# Patient Record
Sex: MALE | Race: ASIAN | NOT HISPANIC OR LATINO
[De-identification: names, ages, dates, MRNs, and addresses within clinical notes are randomized per-mention and may not be internally consistent; named-entity substitution may affect disease eponyms.]

---

## 2019-08-01 ENCOUNTER — APPOINTMENT (OUTPATIENT)
Dept: THORACIC SURGERY | Facility: CLINIC | Age: 44
End: 2019-08-01
Payer: COMMERCIAL

## 2019-08-01 VITALS
RESPIRATION RATE: 17 BRPM | SYSTOLIC BLOOD PRESSURE: 161 MMHG | HEIGHT: 64.17 IN | TEMPERATURE: 98.3 F | HEART RATE: 84 BPM | WEIGHT: 189 LBS | DIASTOLIC BLOOD PRESSURE: 95 MMHG | BODY MASS INDEX: 32.27 KG/M2 | OXYGEN SATURATION: 98 %

## 2019-08-01 DIAGNOSIS — Z80.42 FAMILY HISTORY OF MALIGNANT NEOPLASM OF PROSTATE: ICD-10-CM

## 2019-08-01 DIAGNOSIS — Z87.891 PERSONAL HISTORY OF NICOTINE DEPENDENCE: ICD-10-CM

## 2019-08-01 DIAGNOSIS — Z78.9 OTHER SPECIFIED HEALTH STATUS: ICD-10-CM

## 2019-08-01 PROCEDURE — 99205 OFFICE O/P NEW HI 60 MIN: CPT

## 2019-08-02 ENCOUNTER — OUTPATIENT (OUTPATIENT)
Dept: OUTPATIENT SERVICES | Facility: HOSPITAL | Age: 44
LOS: 1 days | End: 2019-08-02

## 2019-08-02 VITALS
SYSTOLIC BLOOD PRESSURE: 134 MMHG | HEIGHT: 65 IN | WEIGHT: 188.05 LBS | TEMPERATURE: 97 F | RESPIRATION RATE: 16 BRPM | DIASTOLIC BLOOD PRESSURE: 90 MMHG | HEART RATE: 76 BPM

## 2019-08-02 DIAGNOSIS — Z98.890 OTHER SPECIFIED POSTPROCEDURAL STATES: Chronic | ICD-10-CM

## 2019-08-02 DIAGNOSIS — J18.1 LOBAR PNEUMONIA, UNSPECIFIED ORGANISM: ICD-10-CM

## 2019-08-02 DIAGNOSIS — T30.0 BURN OF UNSPECIFIED BODY REGION, UNSPECIFIED DEGREE: ICD-10-CM

## 2019-08-02 DIAGNOSIS — G47.33 OBSTRUCTIVE SLEEP APNEA (ADULT) (PEDIATRIC): ICD-10-CM

## 2019-08-02 PROBLEM — Z87.891 FORMER SMOKER: Status: ACTIVE | Noted: 2019-08-02

## 2019-08-02 PROBLEM — Z80.42 FAMILY HISTORY OF MALIGNANT NEOPLASM OF PROSTATE: Status: ACTIVE | Noted: 2019-08-02

## 2019-08-02 PROBLEM — Z78.9 SOCIAL ALCOHOL USE: Status: ACTIVE | Noted: 2019-08-02

## 2019-08-02 LAB
ANION GAP SERPL CALC-SCNC: 15 MMO/L — HIGH (ref 7–14)
BUN SERPL-MCNC: 16 MG/DL — SIGNIFICANT CHANGE UP (ref 7–23)
CALCIUM SERPL-MCNC: 9.9 MG/DL — SIGNIFICANT CHANGE UP (ref 8.4–10.5)
CHLORIDE SERPL-SCNC: 103 MMOL/L — SIGNIFICANT CHANGE UP (ref 98–107)
CO2 SERPL-SCNC: 23 MMOL/L — SIGNIFICANT CHANGE UP (ref 22–31)
CREAT SERPL-MCNC: 0.99 MG/DL — SIGNIFICANT CHANGE UP (ref 0.5–1.3)
GLUCOSE SERPL-MCNC: 88 MG/DL — SIGNIFICANT CHANGE UP (ref 70–99)
HCT VFR BLD CALC: 42.6 % — SIGNIFICANT CHANGE UP (ref 39–50)
HGB BLD-MCNC: 13.5 G/DL — SIGNIFICANT CHANGE UP (ref 13–17)
MCHC RBC-ENTMCNC: 28.4 PG — SIGNIFICANT CHANGE UP (ref 27–34)
MCHC RBC-ENTMCNC: 31.7 % — LOW (ref 32–36)
MCV RBC AUTO: 89.7 FL — SIGNIFICANT CHANGE UP (ref 80–100)
NRBC # FLD: 0 K/UL — SIGNIFICANT CHANGE UP (ref 0–0)
PLATELET # BLD AUTO: 330 K/UL — SIGNIFICANT CHANGE UP (ref 150–400)
PMV BLD: 9.6 FL — SIGNIFICANT CHANGE UP (ref 7–13)
POTASSIUM SERPL-MCNC: 3.9 MMOL/L — SIGNIFICANT CHANGE UP (ref 3.5–5.3)
POTASSIUM SERPL-SCNC: 3.9 MMOL/L — SIGNIFICANT CHANGE UP (ref 3.5–5.3)
RBC # BLD: 4.75 M/UL — SIGNIFICANT CHANGE UP (ref 4.2–5.8)
RBC # FLD: 12.3 % — SIGNIFICANT CHANGE UP (ref 10.3–14.5)
SODIUM SERPL-SCNC: 141 MMOL/L — SIGNIFICANT CHANGE UP (ref 135–145)
WBC # BLD: 9.41 K/UL — SIGNIFICANT CHANGE UP (ref 3.8–10.5)
WBC # FLD AUTO: 9.41 K/UL — SIGNIFICANT CHANGE UP (ref 3.8–10.5)

## 2019-08-02 RX ORDER — AZILSARTAN KAMEDOXOMIL 40 MG/1
40 TABLET ORAL
Refills: 0 | Status: ACTIVE | COMMUNITY

## 2019-08-02 RX ORDER — SODIUM CHLORIDE 9 MG/ML
3 INJECTION INTRAMUSCULAR; INTRAVENOUS; SUBCUTANEOUS EVERY 8 HOURS
Refills: 0 | Status: DISCONTINUED | OUTPATIENT
Start: 2019-08-06 | End: 2019-08-31

## 2019-08-02 RX ORDER — SODIUM CHLORIDE 9 MG/ML
1000 INJECTION, SOLUTION INTRAVENOUS
Refills: 0 | Status: DISCONTINUED | OUTPATIENT
Start: 2019-08-06 | End: 2019-08-31
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
## 2019-08-02 NOTE — H&P PST ADULT - NEGATIVE GASTROINTESTINAL SYMPTOMS
no constipation/no hiccoughs/no nausea/no vomiting/no diarrhea/no change in bowel habits/no jaundice/no melena no diarrhea/no change in bowel habits/no jaundice/no nausea/no vomiting/no constipation/no abdominal pain/no melena/no hiccoughs
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
## 2019-08-02 NOTE — H&P PST ADULT - NEGATIVE CARDIOVASCULAR SYMPTOMS
no palpitations/no dyspnea on exertion/no peripheral edema/no chest pain/no paroxysmal nocturnal dyspnea/no claudication/no orthopnea

## 2019-08-02 NOTE — H&P PST ADULT - NEGATIVE OPHTHALMOLOGIC SYMPTOMS
no lacrimation L/no lacrimation R/no pain R/no irritation L/no irritation R/no loss of vision L/no loss of vision R/no diplopia/no blurred vision L/no discharge L/no pain L/no photophobia/no blurred vision R/no discharge R

## 2019-08-02 NOTE — H&P PST ADULT - NEGATIVE GENERAL GENITOURINARY SYMPTOMS
no urinary hesitancy/no hematuria/no renal colic/no flank pain R/no incontinence/normal urinary frequency/no bladder infections/no dysuria/no flank pain L/no urine discoloration/no gas in urine/no nocturia

## 2019-08-02 NOTE — H&P PST ADULT - RS GEN PE MLT RESP DETAILS PC
respirations non-labored/no chest wall tenderness/no rhonchi/airway patent/breath sounds equal/good air movement/clear to auscultation bilaterally/no intercostal retractions/no rales/no wheezes

## 2019-08-02 NOTE — H&P PST ADULT - NEGATIVE BREAST SYMPTOMS
no breast tenderness L/no nipple discharge R/no breast lump R/no breast tenderness R/no breast lump L/no nipple discharge L

## 2019-08-02 NOTE — H&P PST ADULT - NSICDXPASTMEDICALHX_GEN_ALL_CORE_FT
PAST MEDICAL HISTORY:  HTN (hypertension) PAST MEDICAL HISTORY:  HTN (hypertension)     Obesity     VIDHI on CPAP

## 2019-08-02 NOTE — H&P PST ADULT - NEGATIVE RESPIRATORY AND THORAX SYMPTOMS
no wheezing/no pleuritic chest pain/no dyspnea/no hemoptysis no pleuritic chest pain/no wheezing/no dyspnea

## 2019-08-05 ENCOUNTER — TRANSCRIPTION ENCOUNTER (OUTPATIENT)
Age: 44
End: 2019-08-05

## 2019-08-05 ENCOUNTER — FORM ENCOUNTER (OUTPATIENT)
Age: 44
End: 2019-08-05

## 2019-08-05 NOTE — CONSULT LETTER
[Dear  ___] : Dear  [unfilled], [Consult Letter:] : I had the pleasure of evaluating your patient, [unfilled]. [( Thank you for referring [unfilled] for consultation for _____ )] : Thank you for referring [unfilled] for consultation for [unfilled] [Please see my note below.] : Please see my note below. [Consult Closing:] : Thank you very much for allowing me to participate in the care of this patient.  If you have any questions, please do not hesitate to contact me. [Sincerely,] : Sincerely, [FreeTextEntry2] : Jonny Kaufman MD (Pul/Referring)\par  [FreeTextEntry3] : Michael Amato MD, MPH \par System Director of Thoracic Surgery \par Director of Comprehensive Lung and Foregut [place] [facility] \par Professor Cardiovascular & Thoracic Surgery  \par [facility] [employer] at [facility]\par

## 2019-08-05 NOTE — REVIEW OF SYSTEMS
[Negative] : Heme/Lymph [As Noted in HPI] : as noted in HPI [Cough] : cough [SOB on Exertion] : no shortness of breath during exertion [Wheezing] : no wheezing [FreeTextEntry6] : hemoptysis

## 2019-08-05 NOTE — DATA REVIEWED
[FreeTextEntry1] : CT Chest on 7/9/19:\par - a decreasing size 5.1 x 2.7cm consolidation (series 3 image 42) in the RLL consolidation (Previously 7.8 x 2.7cm)\par - RLL bronchus w/ a persistent focal narrowing (series 3 image 33)

## 2019-08-05 NOTE — HISTORY OF PRESENT ILLNESS
[FreeTextEntry1] : Mr. TYLER GAMBLE, 43 year old male, former smoker, w/ hx of HTN, VIDHI, who presented to [facility] Dr. Jonny Kaufman c/o dry cough x 2-3 months after having episode of choking when eating food, treated for PNA with Z-James and Levaquin.\par \par Flex Bronch bx on 3/7/19 by Dr. Kaufman showed a polypoid lesion in the RLL w/ copious amount of purulent sputum. Path showed vegetable material and acutely and chronically inflamed benign bronchial mucosa.\par \par CT Chest on 7/9/19:\par - a decreasing size 5.1 x 2.7cm consolidation (series 3 image 42) in the RLL consolidation (Previously 7.8 x 2.7cm)\par - RLL bronchus w/ a persistent focal narrowing (series 3 image 33)\par \par Patient is here today for CT Sx consultation, referred by Dr. Jonny Kaufman. Patient continues to have hemoptysis and yellowish sputum. Dr. Kaufman recommended a rigid bronch to diagnose foreign body granuloma vs neoplasm.

## 2019-08-05 NOTE — ASSESSMENT
[FreeTextEntry1] : Mr. TYLER GAMBLE, 43 year old male, former smoker, w/ hx of HTN, VIDHI, who presented to [facility] Dr. Jonny Kaufman c/o dry cough x 2-3 months after having episode of choking when eating food, treated for PNA with Z-James and Levaquin.\par \par Flex Bronch bx on 3/7/19 by Dr. Kaufman showed a polypoid lesion in the RLL w/ copious amount of purulent sputum. Path showed vegetable material and acutely and chronically inflamed benign bronchial mucosa.\par \par CT Chest on 7/9/19:\par - a decreasing size 5.1 x 2.7cm consolidation (series 3 image 42) in the RLL consolidation (Previously 7.8 x 2.7cm)\par - RLL bronchus w/ a persistent focal narrowing (series 3 image 33)\par \par I have reviewed the patient's medical records and diagnostic images at time of this office consultation and have made the following recommendation:\par 1. CT and bronchoscopy finding reviewed with pt. I recommended Flex bronchoscopy, retrieval of foreign body on 8/6/19. Risks and benefits and alternatives explained to patient, all questions answered, patient agreed to proceed with procedure.\par 2. PST\par \par \par Written by Kanwal Dickinson NP, acting as a scribe for Dr. Michael Amato. \par \par The documentation recorded by the scribe accurately reflects the service I personally performed and the decisions made by me. MICHAEL AMATO MD\par

## 2019-08-05 NOTE — PHYSICAL EXAM
[General Appearance - Alert] : alert [General Appearance - In No Acute Distress] : in no acute distress [Sclera] : the sclera and conjunctiva were normal [PERRL With Normal Accommodation] : pupils were equal in size, round, and reactive to light [Extraocular Movements] : extraocular movements were intact [Outer Ear] : the ears and nose were normal in appearance [Oropharynx] : the oropharynx was normal [Neck Appearance] : the appearance of the neck was normal [Neck Cervical Mass (___cm)] : no neck mass was observed [Jugular Venous Distention Increased] : there was no jugular-venous distention [Thyroid Diffuse Enlargement] : the thyroid was not enlarged [Thyroid Nodule] : there were no palpable thyroid nodules [Auscultation Breath Sounds / Voice Sounds] : lungs were clear to auscultation bilaterally [Heart Rate And Rhythm] : heart rate was normal and rhythm regular [Heart Sounds] : normal S1 and S2 [Murmurs] : no murmurs [Heart Sounds Gallop] : no gallops [Examination Of The Chest] : the chest was normal in appearance [Heart Sounds Pericardial Friction Rub] : no pericardial rub [Chest Visual Inspection Thoracic Asymmetry] : no chest asymmetry [Diminished Respiratory Excursion] : normal chest expansion [2+] : left 2+ [Breast Appearance] : normal in appearance [Breast Palpation Mass] : no palpable masses [Bowel Sounds] : normal bowel sounds [Abdomen Soft] : soft [Abdomen Tenderness] : non-tender [Abdomen Mass (___ Cm)] : no abdominal mass palpated [Cervical Lymph Nodes Enlarged Posterior Bilaterally] : posterior cervical [Cervical Lymph Nodes Enlarged Anterior Bilaterally] : anterior cervical [Supraclavicular Lymph Nodes Enlarged Bilaterally] : supraclavicular [No CVA Tenderness] : no ~M costovertebral angle tenderness [No Spinal Tenderness] : no spinal tenderness [Abnormal Walk] : normal gait [Nail Clubbing] : no clubbing  or cyanosis of the fingernails [Musculoskeletal - Swelling] : no joint swelling seen [Motor Tone] : muscle strength and tone were normal [Skin Color & Pigmentation] : normal skin color and pigmentation [] : no rash [Skin Turgor] : normal skin turgor [Deep Tendon Reflexes (DTR)] : deep tendon reflexes were 2+ and symmetric [Sensation] : the sensory exam was normal to light touch and pinprick [No Focal Deficits] : no focal deficits [Oriented To Time, Place, And Person] : oriented to person, place, and time [Impaired Insight] : insight and judgment were intact [Affect] : the affect was normal [FreeTextEntry1] : deferred

## 2019-08-06 ENCOUNTER — APPOINTMENT (OUTPATIENT)
Dept: THORACIC SURGERY | Facility: HOSPITAL | Age: 44
End: 2019-08-06

## 2019-08-06 ENCOUNTER — RESULT REVIEW (OUTPATIENT)
Age: 44
End: 2019-08-06

## 2019-08-06 ENCOUNTER — OUTPATIENT (OUTPATIENT)
Dept: OUTPATIENT SERVICES | Facility: HOSPITAL | Age: 44
LOS: 1 days | Discharge: HOME | End: 2019-08-06
Payer: COMMERCIAL

## 2019-08-06 VITALS
DIASTOLIC BLOOD PRESSURE: 79 MMHG | WEIGHT: 188.05 LBS | HEIGHT: 65 IN | RESPIRATION RATE: 16 BRPM | TEMPERATURE: 98 F | SYSTOLIC BLOOD PRESSURE: 128 MMHG | HEART RATE: 77 BPM | OXYGEN SATURATION: 95 %

## 2019-08-06 VITALS
TEMPERATURE: 98 F | SYSTOLIC BLOOD PRESSURE: 105 MMHG | OXYGEN SATURATION: 95 % | RESPIRATION RATE: 16 BRPM | DIASTOLIC BLOOD PRESSURE: 69 MMHG | HEART RATE: 72 BPM

## 2019-08-06 DIAGNOSIS — Z98.890 OTHER SPECIFIED POSTPROCEDURAL STATES: Chronic | ICD-10-CM

## 2019-08-06 DIAGNOSIS — J18.1 LOBAR PNEUMONIA, UNSPECIFIED ORGANISM: ICD-10-CM

## 2019-08-06 LAB
GRAM STN SPT: SIGNIFICANT CHANGE UP
SPECIMEN SOURCE: SIGNIFICANT CHANGE UP

## 2019-08-06 PROCEDURE — 71045 X-RAY EXAM CHEST 1 VIEW: CPT | Mod: 26

## 2019-08-06 PROCEDURE — 31624 DX BRONCHOSCOPE/LAVAGE: CPT

## 2019-08-06 PROCEDURE — 31625 BRONCHOSCOPY W/BIOPSY(S): CPT

## 2019-08-06 PROCEDURE — 88305 TISSUE EXAM BY PATHOLOGIST: CPT | Mod: 26,59

## 2019-08-06 PROCEDURE — 88112 CYTOPATH CELL ENHANCE TECH: CPT | Mod: 26

## 2019-08-06 PROCEDURE — 88305 TISSUE EXAM BY PATHOLOGIST: CPT | Mod: 26

## 2019-08-06 RX ADMIN — SODIUM CHLORIDE 30 MILLILITER(S): 9 INJECTION, SOLUTION INTRAVENOUS at 15:28

## 2019-08-06 NOTE — ASU DISCHARGE PLAN (ADULT/PEDIATRIC) - CALL YOUR DOCTOR IF YOU HAVE ANY OF THE FOLLOWING:
Fever greater than (need to indicate Fahrenheit or Celsius)/Inability to tolerate liquids or foods/Nausea and vomiting that does not stop Wound/Surgical Site with redness, or foul smelling discharge or pus/Inability to tolerate liquids or foods/Unable to urinate/Fever greater than (need to indicate Fahrenheit or Celsius)/Nausea and vomiting that does not stop

## 2019-08-06 NOTE — BRIEF OPERATIVE NOTE - NSICDXBRIEFPROCEDURE_GEN_ALL_CORE_FT
PROCEDURES:  Bronchoalveolar lavage with endobronchial biopsy 06-Aug-2019 17:30:44  Maria R Ramirez  Bronchoscopy, flexible, adult 06-Aug-2019 17:28:43  Maria R Ramirez

## 2019-08-06 NOTE — ASU DISCHARGE PLAN (ADULT/PEDIATRIC) - ASU DC SPECIAL INSTRUCTIONSFT
Please call to schedule a follow up appointment with Dr. Amato within 1 week of your hospital discharge. Please call [phone] to schedule your appointment.

## 2019-08-06 NOTE — ASU DISCHARGE PLAN (ADULT/PEDIATRIC) - FOLLOW UP APPOINTMENTS
911 or go to the nearest Emergency Room may also call Recovery Room (PACU) 24/7 @ [phone]/[facility] ASU (Adult):

## 2019-08-06 NOTE — ASU DISCHARGE PLAN (ADULT/PEDIATRIC) - DISCHARGE PLAN IS COMPLETE AND GIVEN TO PATIENT
Hyperopia / Astigmatism / Presbyopia OU- Discussed diagnosis in detail with patient- New glasses Rx given today- Continue to monitor- RTC 1 year complete NIDDM (1991)- Discussed diagnosis in detail with patient- Stressed importance of good blood sugar control- Recommend no soda’s- No BDR seen on today's exam- Patient reports last A1C is unknown and patient states that she does not check her blood sugar daily- Letter to Estefanía- Continue to monitor- RTC 1 year completePseudophakia OU - Discussed diagnosis in detail with patient- Patient is stable and doing well with no glare complaint- Trace PCO noted OU but stable and no treatment needed ta this time - Continue to monitor : Yes

## 2019-08-06 NOTE — ASU DISCHARGE PLAN (ADULT/PEDIATRIC) - CARE PROVIDER_API CALL
Michael Amato (MD)  Surgery; Thoracic Surgery  [id number] [place] [address]  Phone: [phone]  Fax: [phone]  Follow Up Time: 2 weeks

## 2019-08-07 PROBLEM — G47.33 OBSTRUCTIVE SLEEP APNEA (ADULT) (PEDIATRIC): Chronic | Status: ACTIVE | Noted: 2019-08-02

## 2019-08-07 PROBLEM — I10 ESSENTIAL (PRIMARY) HYPERTENSION: Chronic | Status: ACTIVE | Noted: 2019-08-02

## 2019-08-07 PROBLEM — E66.9 OBESITY, UNSPECIFIED: Chronic | Status: ACTIVE | Noted: 2019-08-02

## 2019-08-07 LAB
CULTURE - ACID FAST SMEAR CONCENTRATED: SIGNIFICANT CHANGE UP
SPECIMEN SOURCE: SIGNIFICANT CHANGE UP

## 2019-08-08 LAB — SPECIMEN SOURCE: SIGNIFICANT CHANGE UP

## 2019-08-09 LAB — BACTERIA SPT RESP CULT: SIGNIFICANT CHANGE UP

## 2019-08-13 LAB — SPECIMEN SOURCE: SIGNIFICANT CHANGE UP

## 2019-08-15 ENCOUNTER — APPOINTMENT (OUTPATIENT)
Dept: THORACIC SURGERY | Facility: CLINIC | Age: 44
End: 2019-08-15
Payer: COMMERCIAL

## 2019-08-15 VITALS
OXYGEN SATURATION: 98 % | DIASTOLIC BLOOD PRESSURE: 86 MMHG | WEIGHT: 186 LBS | TEMPERATURE: 98 F | RESPIRATION RATE: 18 BRPM | HEART RATE: 96 BPM | SYSTOLIC BLOOD PRESSURE: 132 MMHG | BODY MASS INDEX: 31.76 KG/M2

## 2019-08-15 PROCEDURE — 99213 OFFICE O/P EST LOW 20 MIN: CPT

## 2019-08-19 NOTE — ASSESSMENT
[FreeTextEntry1] : Mr. TYLER GAMBLE, 43 year old male, former smoker, w/ hx of HTN, VIDHI, who presented to [facility] Dr. Jonny Kaufman c/o dry cough x 2-3 months after having episode of choking when eating food, treated for PNA with Z-James and Levaquin.\par \par Flex Bronch bx on 3/7/19 by Dr. Kaufman showed a polypoid lesion in the RLL w/ copious amount of purulent sputum. Path showed vegetable material and acutely and chronically inflamed benign bronchial mucosa.\par \par CT Chest on 7/9/19:\par - a decreasing size 5.1 x 2.7cm consolidation (series 3 image 42) in the RLL consolidation (Previously 7.8 x 2.7cm)\par - RLL bronchus w/ a persistent focal narrowing (series 3 image 33)\par \par Now 1wk s/p FB bx of RLL endobronchial lesion and BAL on 8/6/19. Bronch of the Rt side showed endobronchial lesion of RLL basal segmental bronchus, no foreign body seen. Path of RLL transbronchial bx showed severe acute and chronic bronchitis. RLL BAL negative for malignancy. \par \par I have reviewed the patient's medical records and diagnostic images at time of this office consultation and have made the following recommendation:\par 1. Path reviewed and explained to patient. F/U with Dr. Kaufman.\par 2. RTC in 3 mo with CT Chest w/out contrast\par 3. If persistent granulation, then will repeat Flex Bronch w/ possible laser.\par \par \par Written by Christine Ralph NP, acting as a scribe for Dr. Michael Amato.\par \par The documentation recorded by the scribe accurately reflects the service I personally performed and the decisions made by me. MICHAEL AMATO MD\par

## 2019-08-19 NOTE — DATA REVIEWED
[FreeTextEntry1] : FB bx of RLL endobronchial lesion and BAL on 8/6/19. Bronch of the Rt side showed endobronchial lesion of RLL basal segmental bronchus, no foreign body seen. Path of RLL transbronchial bx showed severe acute and chronic bronchitis. RLL BAL negative for malignancy. \par

## 2019-08-19 NOTE — CONSULT LETTER
[Dear  ___] : Dear  [unfilled], [Consult Letter:] : I had the pleasure of evaluating your patient, [unfilled]. [Please see my note below.] : Please see my note below. [( Thank you for referring [unfilled] for consultation for _____ )] : Thank you for referring [unfilled] for consultation for [unfilled] [Consult Closing:] : Thank you very much for allowing me to participate in the care of this patient.  If you have any questions, please do not hesitate to contact me. [Sincerely,] : Sincerely, [FreeTextEntry2] : Jonny Kaufman MD (Pul/Referring) [FreeTextEntry3] : Michael Amato MD, MPH \par System Director of Thoracic Surgery \par Director of Comprehensive Lung and Foregut [place] \par Professor Cardiovascular & Thoracic Surgery  \par [facility] [employer] at [facility]\par

## 2019-08-19 NOTE — HISTORY OF PRESENT ILLNESS
[FreeTextEntry1] : Mr. TYLER GAMBLE, 43 year old male, former smoker, w/ hx of HTN, VIDHI, who presented to [facility] Dr. Jonny Kaufman c/o dry cough x 2-3 months after having episode of choking when eating food, treated for PNA with Z-James and Levaquin.\par \par Flex Bronch bx on 3/7/19 by Dr. Kaufman showed a polypoid lesion in the RLL w/ copious amount of purulent sputum. Path showed vegetable material and acutely and chronically inflamed benign bronchial mucosa.\par \par CT Chest on 7/9/19:\par - a decreasing size 5.1 x 2.7cm consolidation (series 3 image 42) in the RLL consolidation (Previously 7.8 x 2.7cm)\par - RLL bronchus w/ a persistent focal narrowing (series 3 image 33)\par \par Now 1wk s/p FB bx of RLL endobronchial lesion and BAL on 8/6/19. Bronch of the Rt side showed endobronchial lesion of RLL basal segmental bronchus, no foreign body seen. Path of RLL transbronchial bx showed severe acute and chronic bronchitis. RLL BAL negative for malignancy. \par \par Patient is here today for a follow up. Improved hemoptysis, worse in the morning, congestion.

## 2019-09-10 LAB — FUNGUS SPEC QL CULT: SIGNIFICANT CHANGE UP

## 2019-09-17 LAB — ACID FAST STN SPEC: SIGNIFICANT CHANGE UP

## 2019-10-09 ENCOUNTER — APPOINTMENT (OUTPATIENT)
Dept: THORACIC SURGERY | Facility: CLINIC | Age: 44
End: 2019-10-09
Payer: COMMERCIAL

## 2019-10-09 VITALS
OXYGEN SATURATION: 97 % | SYSTOLIC BLOOD PRESSURE: 116 MMHG | HEART RATE: 80 BPM | RESPIRATION RATE: 16 BRPM | BODY MASS INDEX: 30.73 KG/M2 | DIASTOLIC BLOOD PRESSURE: 81 MMHG | WEIGHT: 180 LBS

## 2019-10-09 PROCEDURE — 99214 OFFICE O/P EST MOD 30 MIN: CPT

## 2019-10-09 NOTE — ASSESSMENT
[FreeTextEntry1] : Mr. TYLER GAMBLE, 43 year old male, former smoker, w/ hx of HTN, VIDHI, who presented to [facility] Dr. Jonny Kaufman c/o dry cough x 2-3 months after having episode of choking when eating food, treated for PNA with Z-James and Levaquin.\par \par Now 2mo s/p FB bx of RLL endobronchial lesion and BAL on 8/6/19. Bronch of the Rt side showed endobronchial lesion of RLL basal segmental bronchus, no foreign body seen. Path of RLL transbronchial bx showed severe acute and chronic bronchitis. RLL BAL negative for malignancy. \par \par I have reviewed the patient's medical records and diagnostic images at time of this office consultation and have made the following recommendation:\par 1. Patient is still having hemoptysis, I recommended a Flex Bronch Biopsy, with possible laser fuguration on 11/7/19. Risks and benefits and alternatives explained to patient, all questions answered, patient agreed to proceed with procedure.\par 2. PST prior to procedure.\par \par \par Written by Christine Ralph NP, acting as a scribe for Dr. Michael Amato.\par \par The documentation recorded by the scribe accurately reflects the service I personally performed and the decisions made by me. MICHAEL AMATO MD\par

## 2019-10-09 NOTE — CONSULT LETTER
[Dear  ___] : Dear  [unfilled], [Consult Letter:] : I had the pleasure of evaluating your patient, [unfilled]. [( Thank you for referring [unfilled] for consultation for _____ )] : Thank you for referring [unfilled] for consultation for [unfilled] [Please see my note below.] : Please see my note below. [Consult Closing:] : Thank you very much for allowing me to participate in the care of this patient.  If you have any questions, please do not hesitate to contact me. [Sincerely,] : Sincerely, [FreeTextEntry3] : Michael Amato MD, MPH \par System Director of Thoracic Surgery \par Director of Comprehensive Lung and Foregut [place] \par Professor Cardiovascular & Thoracic Surgery  \par [facility] [employer] at [facility]\par  [FreeTextEntry2] : Jonny Kaufman MD (Pul/Referring)

## 2019-10-09 NOTE — HISTORY OF PRESENT ILLNESS
[FreeTextEntry1] : Mr. TYLER GAMBLE, 43 year old male, former smoker, w/ hx of HTN, VIDHI, who presented to [facility] Dr. Jonny Kaufman c/o dry cough x 2-3 months after having episode of choking when eating food, treated for PNA with Z-James and Levaquin.\par \par Flex Bronch bx on 3/7/19 by Dr. Kaufman showed a polypoid lesion in the RLL w/ copious amount of purulent sputum. Path showed vegetable material and acutely and chronically inflamed benign bronchial mucosa.\par \par CT Chest on 7/9/19:\par - a decreasing size 5.1 x 2.7cm consolidation (series 3 image 42) in the RLL consolidation (Previously 7.8 x 2.7cm)\par - RLL bronchus w/ a persistent focal narrowing (series 3 image 33)\par \par Now 2mo s/p FB bx of RLL endobronchial lesion and BAL on 8/6/19. Bronch of the Rt side showed endobronchial lesion of RLL basal segmental bronchus, no foreign body seen. Path of RLL transbronchial bx showed severe acute and chronic bronchitis. RLL BAL negative for malignancy. \par \par Patient is here today for a follow up, c/o hemoptysis.

## 2019-10-09 NOTE — PHYSICAL EXAM
[Auscultation Breath Sounds / Voice Sounds] : lungs were clear to auscultation bilaterally [Heart Rate And Rhythm] : heart rate was normal and rhythm regular [Murmurs] : no murmurs [Heart Sounds Gallop] : no gallops [Heart Sounds] : normal S1 and S2 [Examination Of The Chest] : the chest was normal in appearance [Chest Visual Inspection Thoracic Asymmetry] : no chest asymmetry [Heart Sounds Pericardial Friction Rub] : no pericardial rub [Diminished Respiratory Excursion] : normal chest expansion [Bowel Sounds] : normal bowel sounds [Abdomen Soft] : soft [Abdomen Tenderness] : non-tender [Abdomen Mass (___ Cm)] : no abdominal mass palpated [Abnormal Walk] : normal gait [Nail Clubbing] : no clubbing  or cyanosis of the fingernails [Musculoskeletal - Swelling] : no joint swelling seen [Motor Tone] : muscle strength and tone were normal [Skin Color & Pigmentation] : normal skin color and pigmentation [Skin Turgor] : normal skin turgor [] : no rash [Deep Tendon Reflexes (DTR)] : deep tendon reflexes were 2+ and symmetric [Sensation] : the sensory exam was normal to light touch and pinprick [No Focal Deficits] : no focal deficits [Oriented To Time, Place, And Person] : oriented to person, place, and time [Affect] : the affect was normal [Impaired Insight] : insight and judgment were intact

## 2019-10-24 ENCOUNTER — OUTPATIENT (OUTPATIENT)
Dept: OUTPATIENT SERVICES | Facility: HOSPITAL | Age: 44
LOS: 1 days | End: 2019-10-24

## 2019-10-24 VITALS
SYSTOLIC BLOOD PRESSURE: 110 MMHG | WEIGHT: 186.07 LBS | DIASTOLIC BLOOD PRESSURE: 64 MMHG | RESPIRATION RATE: 16 BRPM | HEIGHT: 64 IN | HEART RATE: 87 BPM | TEMPERATURE: 98 F

## 2019-10-24 DIAGNOSIS — G47.30 SLEEP APNEA, UNSPECIFIED: ICD-10-CM

## 2019-10-24 DIAGNOSIS — Z98.890 OTHER SPECIFIED POSTPROCEDURAL STATES: Chronic | ICD-10-CM

## 2019-10-24 DIAGNOSIS — J18.1 LOBAR PNEUMONIA, UNSPECIFIED ORGANISM: ICD-10-CM

## 2019-10-24 DIAGNOSIS — R05 COUGH: ICD-10-CM

## 2019-10-24 LAB
ANION GAP SERPL CALC-SCNC: 14 MMO/L — SIGNIFICANT CHANGE UP (ref 7–14)
BUN SERPL-MCNC: 19 MG/DL — SIGNIFICANT CHANGE UP (ref 7–23)
CALCIUM SERPL-MCNC: 10.1 MG/DL — SIGNIFICANT CHANGE UP (ref 8.4–10.5)
CHLORIDE SERPL-SCNC: 101 MMOL/L — SIGNIFICANT CHANGE UP (ref 98–107)
CO2 SERPL-SCNC: 26 MMOL/L — SIGNIFICANT CHANGE UP (ref 22–31)
CREAT SERPL-MCNC: 1 MG/DL — SIGNIFICANT CHANGE UP (ref 0.5–1.3)
GLUCOSE SERPL-MCNC: 87 MG/DL — SIGNIFICANT CHANGE UP (ref 70–99)
HCT VFR BLD CALC: 44.3 % — SIGNIFICANT CHANGE UP (ref 39–50)
HGB BLD-MCNC: 14.1 G/DL — SIGNIFICANT CHANGE UP (ref 13–17)
MCHC RBC-ENTMCNC: 28.3 PG — SIGNIFICANT CHANGE UP (ref 27–34)
MCHC RBC-ENTMCNC: 31.8 % — LOW (ref 32–36)
MCV RBC AUTO: 89 FL — SIGNIFICANT CHANGE UP (ref 80–100)
NRBC # FLD: 0 K/UL — SIGNIFICANT CHANGE UP (ref 0–0)
PLATELET # BLD AUTO: 260 K/UL — SIGNIFICANT CHANGE UP (ref 150–400)
PMV BLD: 10.9 FL — SIGNIFICANT CHANGE UP (ref 7–13)
POTASSIUM SERPL-MCNC: 4.1 MMOL/L — SIGNIFICANT CHANGE UP (ref 3.5–5.3)
POTASSIUM SERPL-SCNC: 4.1 MMOL/L — SIGNIFICANT CHANGE UP (ref 3.5–5.3)
RBC # BLD: 4.98 M/UL — SIGNIFICANT CHANGE UP (ref 4.2–5.8)
RBC # FLD: 13 % — SIGNIFICANT CHANGE UP (ref 10.3–14.5)
SODIUM SERPL-SCNC: 141 MMOL/L — SIGNIFICANT CHANGE UP (ref 135–145)
WBC # BLD: 10.51 K/UL — HIGH (ref 3.8–10.5)
WBC # FLD AUTO: 10.51 K/UL — HIGH (ref 3.8–10.5)

## 2019-10-24 RX ORDER — SODIUM CHLORIDE 9 MG/ML
1000 INJECTION, SOLUTION INTRAVENOUS
Refills: 0 | Status: DISCONTINUED | OUTPATIENT
Start: 2019-10-31 | End: 2019-11-17

## 2019-10-24 NOTE — H&P PST ADULT - LYMPH NODES
Physical Therapy Daily Treatment    Visit Count: 2 of 6 by 11/22   Plan of Care: 9/24/2019 Through: 12/3/2019  Insurance Information:     Therapy Benefits  Payor: Medicaid - [place] / [employer]  Authorization Needed: After evaluation  Visit Limit: Based on authorization  CoPay: $0  Notes: Treatment during eval is ok         Authorized     Auth #: [id number]     Date Span: 09/24/19 - 11/22/19     Number of Visits: 6              Referred by: Gladis Solis PA-C; Next provider visit (if known/scheduled): as needed  Medical Diagnosis (from order):       Diagnosis Information             Diagnosis      722.4 (ICD-9-CM) - M50.30 (ICD-10-CM) - DDD (degenerative disc disease), cervical       Chart reviewed at time of initial evaluation (relevant co-morbidities, allergies, tests and medications listed): Left arm paresthesia, osteoarthritis cervical spine, history of myocardial infarction, coronary atherosclerosis, palpitations, malignant melanoma skin with excision 7/21/17, left heart catheter 2009,  Description of Problem and/or Mechanism of Injury: pt notes that she has pain from the anterior of her throat with swallowing feeling like a choking fielding.  Pt gets pain in the left neck and the left upper trap to the left UE.   Pt has pain in the upper arm and tingling into the left hand.  Pain increases with sitting on couch, seat belt across left chest, sleeping can be limited, and lifting and push/pull with the left UE.  Pain limits milking.   Home Environment/Social Support: Patient lives with significant other; consistent assist from family/friends available.  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  Has land and plants, tractor, chores that are heavy daily.  Pt is able to do but requires more time/effort.     SUBJECTIVE   Pt has been able to do her HEP.  Some relief from last visit.  Pt had initial benefit from treatment at eval.  Pt not having as much tingling into the right arm.  Neck pain is better  also.    Pt does have pain in her hips that have been bothering her over the past 6 months at least.  Increases with sleeping, sitting, and occasional with walking    Current Pain (0-10 scale): 2   Functional Change: HEP going well.  Decreased tingling into Left arm     OBJECTIVE       Treatment       Therapeutic Exercise:   Corrected exercise for proper form.  Good form after.   Postural: Shrugs and retractions 5×5 seconds, levator stretch 3×20 seconds, door stretch 3×20 seconds.    Added: level III band: rows and extension with band at shoulder height, pull down and bow/arrow with band above door x 12.  Pt demonstrated good technique with exercise and pictures issued.  Good understanding shown by pt.        Manual Therapy:   Manual soft tissue release through left cervical and upper trap with trigger point release through levator and upper trap.  PA mobilization T2 through C6 to loosen restrictions.     Ultrasound (32806):  Location: Left cervical and upper trap  Position: sitting  Duration: 12 minutes Duty Cycle: 100% duty cycle  Frequency: 1 Mhz  Intensity: 1.5 W/cm2   Results: decreased pain and improved range of motion; no adverse reaction to treatment     Skilled input: Evaluation techniques, manual therapy, use modalities, issuance proper home exercise program, answered patient questions, see above     Home Program:  * above=instructed home program    As above 3 times per day     Writer verbally educated the patient and received verbal consent from the patient on hand placement, positioning of patient, and techniques to be performed today including clothing adjustments for techniques, therapist position for techniques, hand placement and palpation for techniques, modality application as described above and how they are pertinent to the patient's plan of care.       Suggestions for next session as indicated: progress per plan of care, continue use of modalities as indicated, manual therapy, progress range of  motion and strength exercises, possible use of mechanical traction      ASSESSMENT   Today: pt tolerated treatment well.   Pt had good benefit from use of US and manual therapy with emphasis on the left UT and paraspinals.  Will continue PT to address deficits in ROM, strength, and pain limiting ADL and daily activity.   .  Pain after treatment (patient reported, 0-10 scale): 2  Result of above outlined education: Verbalizes understanding and Demonstrates understanding    THERAPY DAILY BILLING   Insurance: N/A 2. N/A    Evaluation Procedures:  No evaluation codes were used on this date of service    Timed Procedures:  Manual Therapy, 17 minutes  Therapeutic Exercise, 15 minutes  Ultrasound, 12 minutes    Untimed Procedures:  No untimed codes were used on this date of service    Total Treatment Time: 44 minutes   No lymphadedenopathy

## 2019-10-24 NOTE — H&P PST ADULT - I HAVE PERSONALLY SEEN AND EXAMINED THE PATIENT. THERE HAVE NOT BEEN ANY CHANGES IN THE PATIENT'S HISTORY OR EXAM UNLESS COMMENTED BELOW
The patient has been re-examined and I agree with the above assessment or I updated with my findings. Statement Selected

## 2019-10-24 NOTE — H&P PST ADULT - HISTORY OF PRESENT ILLNESS
Pt. is a 45 yo male with a chronic cough.  Pt. has blood tinged sputum.  Pt. has had 2 bronchoscopies.

## 2019-10-24 NOTE — H&P PST ADULT - LAST PROSTATE EXAM
ADMIT DATE:  



CHIEF COMPLAINT:  Psychosis.



HISTORY OF PRESENT ILLNESS:  The patient is a 69-year-old male with long history

of dementia, psychosis, Parkinson's disease, and degenerative joint disease,

admitted to [facility] for evaluation and treatment.  The

patient is a poor historian.



PAST MEDICAL HISTORY:  Parkinson's disease, degenerative joint disease,

dementia, and psychosis.



PAST SURGICAL HISTORY:  No recent surgery.



ALLERGIES:  SULFA.



SOCIAL HISTORY:  No alcohol, drug.



FAMILY HISTORY:  Noncontributory.



REVIEW OF SYSTEMS:

RENAL SYSTEM:  No history of chronic renal disorder.

CARDIOVASCULAR SYSTEM:  No coronary artery disease.

ENDOCRINE SYSTEM:  No diabetes or thyroid problem.

GASTROINTESTINAL SYSTEM:  No upper or lower GI bleed.

NEUROLOGICAL SYSTEM:  No seizure disorder.

SKELETOMUSCULAR SYSTEM:  No muscular dystrophy.

HEMATOLOGICAL SYSTEM:  No bleeding tendencies.

RESPIRATORY SYSTEM:  No asthma.

GENITOURINARY SYSTEM:  No dysuria or hematuria.



PHYSICAL EXAMINATION:

GENERAL:  He is awake, not coherent.

VITAL SIGNS:  Temperature 97.1, heart rate 74, and blood pressure 101/53.

HEENT:  Normocephalic.  Pupils are reactive to light and accommodation.  Sclerae

clear.

NECK:  Supple.  Negative for lymphadenopathy, JVD, or bruit.

CHEST:  Entry of air bilateral normal.  No rales, rhonchi, or wheezing.

HEART:  S1 and S2 normal.  No murmur or gallop rhythm.

ABDOMEN:  Soft.  Bowel sounds positive.

EXTREMITIES:  No edema.

BACK:  No tenderness.

SKIN:  Intact.

NEUROLOGIC:  He is awake, not coherent.  No focal motor or sensory deficit.



ASSESSMENT:

1.  Parkinson's disease.

2.  Degenerative joint disease.

3.  Psychosis.

4.  Dementia.



PLAN:  The patient admitted to the hospital under Dr. Coe's service.  The

medical problem to be addressed during hospitalization is psychosis, dementia. 

Medical problems to be addressed at discharge is Parkinson's disease.  The

patient is medically stable for activity.



Thank you, Dr. Coe for asking me to see your patient.





DD: 05/11/2018 19:57

DT: 05/11/2018 21:08

JOB# [id number]  [id number]
pt. has not had

## 2019-10-24 NOTE — H&P PST ADULT - NSICDXPASTMEDICALHX_GEN_ALL_CORE_FT
PAST MEDICAL HISTORY:  HTN (hypertension)     Hyperlipidemia     Obesity     VIDHI on CPAP     Psoriasis

## 2019-10-24 NOTE — H&P PST ADULT - NSICDXPROBLEM_GEN_ALL_CORE_FT
PROBLEM DIAGNOSES  Problem: Chronic cough  Assessment and Plan: Pt. is scheduled for a flexible bronchoscopy biopsy, possible laser fulguration 10/31/19.  Pt. verbalized understanding of instructions as discussed and verbalized on the instruction sheet. PROBLEM DIAGNOSES  Problem: Chronic cough  Assessment and Plan: Pt. is scheduled for a flexible bronchoscopy biopsy, possible laser fulguration 10/31/19.  Pt. verbalized understanding of instructions as discussed and verbalized on the instruction sheet.     Problem: Sleep apnea  Assessment and Plan: OR booking notified.

## 2019-10-24 NOTE — H&P PST ADULT - MALLAMPATI CLASS
Class I (easy) - visualization of the soft palate, fauces, uvula, and both anterior and posterior pillars on phonation/Class III - visualization of the soft palate and the base of the uvula

## 2019-10-30 ENCOUNTER — TRANSCRIPTION ENCOUNTER (OUTPATIENT)
Age: 44
End: 2019-10-30

## 2019-10-30 ENCOUNTER — FORM ENCOUNTER (OUTPATIENT)
Age: 44
End: 2019-10-30

## 2019-10-31 ENCOUNTER — APPOINTMENT (OUTPATIENT)
Dept: THORACIC SURGERY | Facility: HOSPITAL | Age: 44
End: 2019-10-31

## 2019-10-31 ENCOUNTER — OUTPATIENT (OUTPATIENT)
Dept: OUTPATIENT SERVICES | Facility: HOSPITAL | Age: 44
LOS: 1 days | Discharge: HOME | End: 2019-10-31
Payer: COMMERCIAL

## 2019-10-31 ENCOUNTER — RESULT REVIEW (OUTPATIENT)
Age: 44
End: 2019-10-31

## 2019-10-31 VITALS
DIASTOLIC BLOOD PRESSURE: 68 MMHG | OXYGEN SATURATION: 100 % | RESPIRATION RATE: 12 BRPM | HEART RATE: 82 BPM | SYSTOLIC BLOOD PRESSURE: 100 MMHG

## 2019-10-31 VITALS
TEMPERATURE: 98 F | HEART RATE: 80 BPM | DIASTOLIC BLOOD PRESSURE: 84 MMHG | SYSTOLIC BLOOD PRESSURE: 129 MMHG | HEIGHT: 64 IN | WEIGHT: 186.07 LBS | RESPIRATION RATE: 17 BRPM

## 2019-10-31 DIAGNOSIS — Z98.890 OTHER SPECIFIED POSTPROCEDURAL STATES: Chronic | ICD-10-CM

## 2019-10-31 DIAGNOSIS — J18.1 LOBAR PNEUMONIA, UNSPECIFIED ORGANISM: ICD-10-CM

## 2019-10-31 PROCEDURE — 31640 BRONCHOSCOPY W/TUMOR EXCISE: CPT

## 2019-10-31 PROCEDURE — 71045 X-RAY EXAM CHEST 1 VIEW: CPT | Mod: 26

## 2019-10-31 PROCEDURE — 31625 BRONCHOSCOPY W/BIOPSY(S): CPT | Mod: 59

## 2019-10-31 PROCEDURE — 31645 BRNCHSC W/THER ASPIR 1ST: CPT

## 2019-10-31 PROCEDURE — 88305 TISSUE EXAM BY PATHOLOGIST: CPT | Mod: 26

## 2019-10-31 RX ORDER — FENTANYL CITRATE 50 UG/ML
50 INJECTION INTRAVENOUS
Refills: 0 | Status: DISCONTINUED | OUTPATIENT
Start: 2019-10-31 | End: 2019-10-31

## 2019-10-31 RX ORDER — FENTANYL CITRATE 50 UG/ML
25 INJECTION INTRAVENOUS
Refills: 0 | Status: DISCONTINUED | OUTPATIENT
Start: 2019-10-31 | End: 2019-10-31

## 2019-10-31 RX ORDER — ONDANSETRON 8 MG/1
4 TABLET, FILM COATED ORAL ONCE
Refills: 0 | Status: DISCONTINUED | OUTPATIENT
Start: 2019-10-31 | End: 2019-11-17

## 2019-10-31 NOTE — ASU DISCHARGE PLAN (ADULT/PEDIATRIC) - CARE PROVIDER_API CALL
Michael Amato (MD)  Surgery; Thoracic Surgery  [id number] [place] [address]  Phone: [phone]  Fax: [phone]  Follow Up Time:

## 2019-10-31 NOTE — ASU DISCHARGE PLAN (ADULT/PEDIATRIC) - CALL YOUR DOCTOR IF YOU HAVE ANY OF THE FOLLOWING:
Bleeding that does not stop Unable to urinate/Pain not relieved by Medications/Bleeding that does not stop/Fever greater than (need to indicate Fahrenheit or Celsius)/Nausea and vomiting that does not stop

## 2019-12-05 ENCOUNTER — APPOINTMENT (OUTPATIENT)
Dept: THORACIC SURGERY | Facility: CLINIC | Age: 44
End: 2019-12-05
Payer: COMMERCIAL

## 2019-12-05 VITALS
RESPIRATION RATE: 18 BRPM | TEMPERATURE: 99.3 F | BODY MASS INDEX: 32.44 KG/M2 | HEART RATE: 91 BPM | DIASTOLIC BLOOD PRESSURE: 72 MMHG | SYSTOLIC BLOOD PRESSURE: 112 MMHG | OXYGEN SATURATION: 97 % | WEIGHT: 190 LBS

## 2019-12-05 PROCEDURE — 99214 OFFICE O/P EST MOD 30 MIN: CPT

## 2019-12-09 NOTE — CONSULT LETTER
[Dear  ___] : Dear  [unfilled], [Consult Letter:] : I had the pleasure of evaluating your patient, [unfilled]. [( Thank you for referring [unfilled] for consultation for _____ )] : Thank you for referring [unfilled] for consultation for [unfilled] [Please see my note below.] : Please see my note below. [Consult Closing:] : Thank you very much for allowing me to participate in the care of this patient.  If you have any questions, please do not hesitate to contact me. [Sincerely,] : Sincerely, [FreeTextEntry3] : Michael Amato MD, MPH \par System Director of Thoracic Surgery \par Director of Comprehensive Lung [facility] [place] \par Professor Cardiovascular & Thoracic Surgery  \par [facility] [employer] [facility]\par  [FreeTextEntry2] : Jonny Kaufman MD (Pul/Referring)

## 2019-12-09 NOTE — HISTORY OF PRESENT ILLNESS
[FreeTextEntry1] : Mr. TYLER GAMBLE, 44 year old male, former smoker, w/ hx of HTN, VIDHI, who presented to Pul Dr. Jonny Kaufman c/o dry cough x 2-3 months after having episode of choking when eating food, treated for PNA with Z-James and Levaquin.\par \par Flex Bronch bx on 3/7/19 by Dr. Kaufman showed a polypoid lesion in the RLL w/ copious amount of purulent sputum. Path showed vegetable material and acutely and chronically inflamed benign bronchial mucosa.\par \par CT Chest on 7/9/19:\par - a decreasing size 5.1 x 2.7cm consolidation (series 3 image 42) in the RLL consolidation (Previously 7.8 x 2.7cm)\par - RLL bronchus w/ a persistent focal narrowing (series 3 image 33)\par \par Now 4 mo s/p FB bx of RLL endobronchial lesion and BAL on 8/6/19. Bronch of the Rt side showed endobronchial lesion of RLL basal segmental bronchus, no foreign body seen. Path of RLL transbronchial bx showed severe acute and chronic bronchitis. RLL BAL negative for malignancy. \par \par Patient was still having hemoptysis\par Now 2 wks s/p Diagnostic FB. Electrocautery ablation of RLL endobronchial obstructive lesions. Bx of RLL endobronchial obstructive lesions on 10/31/2019. Path of RLL bx revealed severe acute and chronic bronchitis with granulation tissue. \par \par Patient is here today for a follow up. Admits to feeling of "congestion and stuck airway", dryness in the throat, s/s generally improved, denies cough or hemoptysis.\par

## 2019-12-09 NOTE — ASSESSMENT
[FreeTextEntry1] : Mr. TYLER GAMBLE, 44 year old male, former smoker, w/ hx of HTN, VIDHI, who presented to [facility] DrMitchell Kaufman c/o dry cough x 2-3 months after having episode of choking when eating food, treated for PNA with Z-James and Levaquin.\par \par Flex Bronch bx on 3/7/19 by Dr. Kaufman showed a polypoid lesion in the RLL w/ copious amount of purulent sputum. Path showed vegetable material and acutely and chronically inflamed benign bronchial mucosa.\par \par CT Chest on 7/9/19:\par - a decreasing size 5.1 x 2.7cm consolidation (series 3 image 42) in the RLL consolidation (Previously 7.8 x 2.7cm)\par - RLL bronchus w/ a persistent focal narrowing (series 3 image 33)\par \par Now 4 mo s/p FB bx of RLL endobronchial lesion and BAL on 8/6/19. Bronch of the Rt side showed endobronchial lesion of RLL basal segmental bronchus, no foreign body seen. Path of RLL transbronchial bx showed severe acute and chronic bronchitis. RLL BAL negative for malignancy. \par \par Patient was still having hemoptysis\par Now 2 wks s/p Diagnostic FB. Electrocautery ablation of RLL endobronchial obstructive lesions. Bx of RLL endobronchial obstructive lesions on 10/31/2019. Path of RLL bx revealed severe acute and chronic bronchitis with granulation tissue. \par \par I have reviewed the patient's medical records and diagnostic images at time of this office consultation and have made the following recommendation:\par 1. Patient is doing better, s/s improved, I would schedule a Flex Bronch electrocautery ablation on 3/5/20. Risks and benefits and alternatives explained to patient, all questions answered, patient agreed to proceed with procedure.\par 2. PST prior\par \par \par I personally performed the services described in the documentation, reviewed the documentation recorded by the scribe in my presence and it accurately and completely records my words and actions.\par \par I, Christine Ralph NP, am scribing for and the presence of GENESIS Dewye, the following sections HISTORY OF PRESENT ILLNESS, PAST MEDICAL/FAMILY/SOCIAL HISTORY; REVIEW OF SYSTEMS; VITAL SIGNS; PHYSICAL EXAM; DISPOSITION.\par \par

## 2019-12-09 NOTE — DATA REVIEWED
[FreeTextEntry1] : Diagnostic FB. Electrocautery ablation of RLL endobronchial obstructive lesions. Bx of RLL endobronchial obstructive lesions on 10/31/2019. Path of RLL bx revealed severe acute and chronic bronchitis with granulation tissue.

## 2020-02-12 PROBLEM — L40.9 PSORIASIS, UNSPECIFIED: Chronic | Status: ACTIVE | Noted: 2019-10-24

## 2020-02-12 PROBLEM — E78.5 HYPERLIPIDEMIA, UNSPECIFIED: Chronic | Status: ACTIVE | Noted: 2019-10-24

## 2020-02-26 ENCOUNTER — OUTPATIENT (OUTPATIENT)
Dept: OUTPATIENT SERVICES | Facility: HOSPITAL | Age: 45
LOS: 1 days | End: 2020-02-26

## 2020-02-26 VITALS
OXYGEN SATURATION: 98 % | DIASTOLIC BLOOD PRESSURE: 80 MMHG | SYSTOLIC BLOOD PRESSURE: 120 MMHG | WEIGHT: 190.04 LBS | RESPIRATION RATE: 16 BRPM | HEIGHT: 66 IN | HEART RATE: 81 BPM | TEMPERATURE: 98 F

## 2020-02-26 DIAGNOSIS — J18.1 LOBAR PNEUMONIA, UNSPECIFIED ORGANISM: ICD-10-CM

## 2020-02-26 DIAGNOSIS — J18.0 BRONCHOPNEUMONIA, UNSPECIFIED ORGANISM: ICD-10-CM

## 2020-02-26 DIAGNOSIS — Z98.890 OTHER SPECIFIED POSTPROCEDURAL STATES: Chronic | ICD-10-CM

## 2020-02-26 LAB
ALBUMIN SERPL ELPH-MCNC: 4.5 G/DL — SIGNIFICANT CHANGE UP (ref 3.3–5)
ALP SERPL-CCNC: 72 U/L — SIGNIFICANT CHANGE UP (ref 40–120)
ALT FLD-CCNC: 51 U/L — HIGH (ref 4–41)
ANION GAP SERPL CALC-SCNC: 15 MMO/L — HIGH (ref 7–14)
AST SERPL-CCNC: 33 U/L — SIGNIFICANT CHANGE UP (ref 4–40)
BILIRUB SERPL-MCNC: 0.3 MG/DL — SIGNIFICANT CHANGE UP (ref 0.2–1.2)
BUN SERPL-MCNC: 18 MG/DL — SIGNIFICANT CHANGE UP (ref 7–23)
CALCIUM SERPL-MCNC: 9.8 MG/DL — SIGNIFICANT CHANGE UP (ref 8.4–10.5)
CHLORIDE SERPL-SCNC: 101 MMOL/L — SIGNIFICANT CHANGE UP (ref 98–107)
CO2 SERPL-SCNC: 25 MMOL/L — SIGNIFICANT CHANGE UP (ref 22–31)
CREAT SERPL-MCNC: 1.01 MG/DL — SIGNIFICANT CHANGE UP (ref 0.5–1.3)
GLUCOSE SERPL-MCNC: 92 MG/DL — SIGNIFICANT CHANGE UP (ref 70–99)
HCT VFR BLD CALC: 48.5 % — SIGNIFICANT CHANGE UP (ref 39–50)
HGB BLD-MCNC: 15.3 G/DL — SIGNIFICANT CHANGE UP (ref 13–17)
MCHC RBC-ENTMCNC: 29.5 PG — SIGNIFICANT CHANGE UP (ref 27–34)
MCHC RBC-ENTMCNC: 31.5 % — LOW (ref 32–36)
MCV RBC AUTO: 93.4 FL — SIGNIFICANT CHANGE UP (ref 80–100)
NRBC # FLD: 0 K/UL — SIGNIFICANT CHANGE UP (ref 0–0)
PLATELET # BLD AUTO: 257 K/UL — SIGNIFICANT CHANGE UP (ref 150–400)
PMV BLD: 10.6 FL — SIGNIFICANT CHANGE UP (ref 7–13)
POTASSIUM SERPL-MCNC: 3.8 MMOL/L — SIGNIFICANT CHANGE UP (ref 3.5–5.3)
POTASSIUM SERPL-SCNC: 3.8 MMOL/L — SIGNIFICANT CHANGE UP (ref 3.5–5.3)
PROT SERPL-MCNC: 8 G/DL — SIGNIFICANT CHANGE UP (ref 6–8.3)
RBC # BLD: 5.19 M/UL — SIGNIFICANT CHANGE UP (ref 4.2–5.8)
RBC # FLD: 13.5 % — SIGNIFICANT CHANGE UP (ref 10.3–14.5)
SODIUM SERPL-SCNC: 141 MMOL/L — SIGNIFICANT CHANGE UP (ref 135–145)
WBC # BLD: 6.44 K/UL — SIGNIFICANT CHANGE UP (ref 3.8–10.5)
WBC # FLD AUTO: 6.44 K/UL — SIGNIFICANT CHANGE UP (ref 3.8–10.5)

## 2020-02-26 RX ORDER — SODIUM CHLORIDE 9 MG/ML
1000 INJECTION, SOLUTION INTRAVENOUS
Refills: 0 | Status: DISCONTINUED | OUTPATIENT
Start: 2020-03-03 | End: 2020-03-18

## 2020-02-26 RX ORDER — SODIUM CHLORIDE 9 MG/ML
3 INJECTION INTRAMUSCULAR; INTRAVENOUS; SUBCUTANEOUS EVERY 8 HOURS
Refills: 0 | Status: DISCONTINUED | OUTPATIENT
Start: 2020-03-03 | End: 2020-03-18

## 2020-02-26 NOTE — H&P PST ADULT - ASSESSMENT
43 yo male with hx of lung consolidiation after choking on food in 11/2018. Pt was coughing up blood, underwent treatment with antibiotics, s/p bronchoscopies with biopsies and ablation, last in 10/2019, now going for further evaluation/treatment.

## 2020-02-26 NOTE — H&P PST ADULT - HISTORY OF PRESENT ILLNESS
43 yo male with hx of lung consolidiation after choking on food in 2018. Pt was choking up blood, underwent 3 bronchoscopies. Presents today for presurgical evaluation for ... 43 yo male with hx of lung consolidiation after choking on food in 11/2018. Pt was coughing up blood, underwent treatment with antibiotics, s/p bronchoscopies with biopsies and ablation, last in 10/2019 . Presents today for presurgical evaluation for Flexible Bronchoscopy, Electrocautery Ablation scheduled on 3/5/2020.

## 2020-02-26 NOTE — H&P PST ADULT - CARDIOVASCULAR COMMENTS
pt states he had an abnormality on his ekg and went to see a cardiologist in 1/2020. Pt states he has stress and echo done and results were normal .

## 2020-02-26 NOTE — H&P PST ADULT - OTHER CARE PROVIDERS
Dr. Kaufman, Pulmonologist [phone], Dr. Rogelio Aguilar [phone] Pulmonologist - Dr. Kaufman [phone], Cardiologist - Dr. Rogelio Aguilar [phone]

## 2020-02-26 NOTE — H&P PST ADULT - MUSCULOSKELETAL
details… detailed exam ROM intact/no joint swelling/normal strength/no joint erythema/no joint warmth/no calf tenderness

## 2020-02-26 NOTE — H&P PST ADULT - NSICDXPROBLEM_GEN_ALL_CORE_FT
PROBLEM DIAGNOSES  Problem: Lobular pneumonia  Assessment and Plan: Pt scheduled for surgery on 3/5/19.   Pre-op instructions provided. Pt verbalized understanding.   Pepcid provided for GI prophylaxis.   Requested copy of last cardiac note, ekg and echo.   Pt instructed to take his Edarbi the morning of surgery.   OR booking notified of VIDHI.

## 2020-02-26 NOTE — H&P PST ADULT - REASON FOR ADMISSION
"I have scar tissue in my lung, so Dr. Amato is gonna use a laser to get rid of the scar" "I have am having bronchoscopy"

## 2020-02-26 NOTE — H&P PST ADULT - NEGATIVE ENMT SYMPTOMS
no ear pain/no throat pain/no dysphagia/no tinnitus/no sinus symptoms/no vertigo/no hearing difficulty

## 2020-03-03 ENCOUNTER — RESULT REVIEW (OUTPATIENT)
Age: 45
End: 2020-03-03

## 2020-03-03 ENCOUNTER — OUTPATIENT (OUTPATIENT)
Dept: OUTPATIENT SERVICES | Facility: HOSPITAL | Age: 45
LOS: 1 days | Discharge: HOME | End: 2020-03-03
Payer: COMMERCIAL

## 2020-03-03 ENCOUNTER — APPOINTMENT (OUTPATIENT)
Dept: THORACIC SURGERY | Facility: HOSPITAL | Age: 45
End: 2020-03-03

## 2020-03-03 ENCOUNTER — APPOINTMENT (OUTPATIENT)
Dept: THORACIC SURGERY | Facility: CLINIC | Age: 45
End: 2020-03-03

## 2020-03-03 VITALS
HEIGHT: 66 IN | SYSTOLIC BLOOD PRESSURE: 139 MMHG | OXYGEN SATURATION: 98 % | DIASTOLIC BLOOD PRESSURE: 89 MMHG | WEIGHT: 190.04 LBS | RESPIRATION RATE: 16 BRPM | HEART RATE: 71 BPM | TEMPERATURE: 98 F

## 2020-03-03 VITALS
RESPIRATION RATE: 16 BRPM | OXYGEN SATURATION: 100 % | DIASTOLIC BLOOD PRESSURE: 74 MMHG | SYSTOLIC BLOOD PRESSURE: 118 MMHG | HEART RATE: 70 BPM

## 2020-03-03 DIAGNOSIS — Z98.890 OTHER SPECIFIED POSTPROCEDURAL STATES: Chronic | ICD-10-CM

## 2020-03-03 DIAGNOSIS — J18.1 LOBAR PNEUMONIA, UNSPECIFIED ORGANISM: ICD-10-CM

## 2020-03-03 PROCEDURE — 31624 DX BRONCHOSCOPE/LAVAGE: CPT

## 2020-03-03 PROCEDURE — 31645 BRNCHSC W/THER ASPIR 1ST: CPT

## 2020-03-03 PROCEDURE — 88305 TISSUE EXAM BY PATHOLOGIST: CPT | Mod: 26

## 2020-03-03 PROCEDURE — 31641 BRONCHOSCOPY TREAT BLOCKAGE: CPT

## 2020-03-03 PROCEDURE — 71045 X-RAY EXAM CHEST 1 VIEW: CPT | Mod: 26

## 2020-03-03 PROCEDURE — 88312 SPECIAL STAINS GROUP 1: CPT | Mod: 26

## 2020-03-03 RX ORDER — FENTANYL CITRATE 50 UG/ML
50 INJECTION INTRAVENOUS
Refills: 0 | Status: DISCONTINUED | OUTPATIENT
Start: 2020-03-03 | End: 2020-03-03

## 2020-03-03 RX ORDER — ONDANSETRON 8 MG/1
4 TABLET, FILM COATED ORAL ONCE
Refills: 0 | Status: DISCONTINUED | OUTPATIENT
Start: 2020-03-03 | End: 2020-03-18

## 2020-03-03 NOTE — ASU DISCHARGE PLAN (ADULT/PEDIATRIC) - CALL YOUR DOCTOR IF YOU HAVE ANY OF THE FOLLOWING:
Bleeding that does not stop/shortness of breath, it is normal to cough up small amounts of blood tinged sputum/Swelling that gets worse Swelling that gets worse/shortness of breath, it is normal to cough up small amounts of blood tinged sputum/Bleeding that does not stop/Fever greater than (need to indicate Fahrenheit or Celsius)/Nausea and vomiting that does not stop

## 2020-03-03 NOTE — ASU PREOP CHECKLIST - RESPIRATORY RATE (BREATHS/MIN)
Please see MyChart comment below along with refill request.    phentermine      Last Written Prescription Date:  12/21/16  Last Fill Quantity: 30,   # refills: 0  Last Office Visit with [facility] prescribing provider: 1/23/17  Future Office visit:       Routing refill request to provider for review/approval because:  Drug not on the [facility] refill protocol or controlled substance  Laura Mathias RN, BSN  [facility]     16

## 2020-03-03 NOTE — ASU PATIENT PROFILE, ADULT - NSALCOHOLPROBLEMSRELYN_GEN_A_CORE_SD
Problem: Mobility - Impaired:  Goal: Mobility will improve  Mobility will improve   Outcome: Ongoing      Problem: Infection - Surgical Site:  Goal: Will show no infection signs and symptoms  Will show no infection signs and symptoms   Outcome: Ongoing      Problem: Pain - Acute:  Goal: Pain level will decrease  Pain level will decrease    Outcome: Ongoing      Problem: Falls - Risk of:  Goal: Will remain free from falls  Will remain free from falls   Outcome: Ongoing    Goal: Absence of physical injury  Absence of physical injury   Outcome: Ongoing      Problem: Pain:  Goal: Control of acute pain  Control of acute pain   Outcome: Ongoing    Goal: Control of chronic pain  Control of chronic pain   Outcome: Ongoing    Goal: Pain level will decrease  Pain level will decrease    Outcome: Ongoing no

## 2020-03-03 NOTE — BRIEF OPERATIVE NOTE - NSICDXBRIEFPROCEDURE_GEN_ALL_CORE_FT
PROCEDURES:  Bronchoscopy with electrocautery 03-Mar-2020 11:35:24 Flex Bronch, BAL, RLL bronchus fulguration Elian Hendricks

## 2020-03-03 NOTE — ASU DISCHARGE PLAN (ADULT/PEDIATRIC) - CARE PROVIDER_API CALL
Michael Amato (MD)  Surgery; Thoracic Surgery  [id number] [place] [address]  Phone: [phone]  Fax: [phone]  Follow Up Time:

## 2020-05-26 ENCOUNTER — APPOINTMENT (OUTPATIENT)
Dept: UROLOGY | Facility: CLINIC | Age: 45
End: 2020-05-26
Payer: COMMERCIAL

## 2020-05-26 VITALS
WEIGHT: 194 LBS | HEIGHT: 64 IN | RESPIRATION RATE: 18 BRPM | DIASTOLIC BLOOD PRESSURE: 84 MMHG | TEMPERATURE: 97 F | SYSTOLIC BLOOD PRESSURE: 118 MMHG | OXYGEN SATURATION: 97 % | BODY MASS INDEX: 33.12 KG/M2 | HEART RATE: 94 BPM

## 2020-05-26 DIAGNOSIS — Z00.00 ENCOUNTER FOR GENERAL ADULT MEDICAL EXAMINATION W/OUT ABNORMAL FINDINGS: ICD-10-CM

## 2020-05-26 DIAGNOSIS — N48.1 BALANITIS: ICD-10-CM

## 2020-05-26 DIAGNOSIS — Z86.79 PERSONAL HISTORY OF OTHER DISEASES OF THE CIRCULATORY SYSTEM: ICD-10-CM

## 2020-05-26 DIAGNOSIS — Z86.69 PERSONAL HISTORY OF OTHER DISEASES OF THE NERVOUS SYSTEM AND SENSE ORGANS: ICD-10-CM

## 2020-05-26 PROCEDURE — 99204 OFFICE O/P NEW MOD 45 MIN: CPT

## 2020-05-26 NOTE — PHYSICAL EXAM
[General Appearance - Well Developed] : well developed [General Appearance - Well Nourished] : well nourished [Normal Appearance] : normal appearance [Well Groomed] : well groomed [General Appearance - In No Acute Distress] : no acute distress [Edema] : no peripheral edema [Respiration, Rhythm And Depth] : normal respiratory rhythm and effort [Exaggerated Use Of Accessory Muscles For Inspiration] : no accessory muscle use [Abdomen Soft] : soft [Costovertebral Angle Tenderness] : no ~M costovertebral angle tenderness [Abdomen Tenderness] : non-tender [Urethral Meatus] : meatus normal [Urinary Bladder Findings] : the bladder was normal on palpation [Scrotum] : the scrotum was normal [Testes Mass (___cm)] : there were no testicular masses [No Prostate Nodules] : no prostate nodules [Normal Station and Gait] : the gait and station were normal for the patient's age [] : no rash [No Focal Deficits] : no focal deficits [Oriented To Time, Place, And Person] : oriented to person, place, and time [Affect] : the affect was normal [Mood] : the mood was normal [Not Anxious] : not anxious [No Palpable Adenopathy] : no palpable adenopathy

## 2020-05-26 NOTE — HISTORY OF PRESENT ILLNESS
[FreeTextEntry1] : new balanitis.\par pt girl friend has recurrent vaginosis\par \par STD panel\par clot/beta\par \par fua 1 month\par \par

## 2020-05-26 NOTE — PHYSICAL EXAM
[General Appearance - Well Nourished] : well nourished [General Appearance - Well Developed] : well developed [Normal Appearance] : normal appearance [Well Groomed] : well groomed [General Appearance - In No Acute Distress] : no acute distress [Edema] : no peripheral edema [Respiration, Rhythm And Depth] : normal respiratory rhythm and effort [Abdomen Soft] : soft [Exaggerated Use Of Accessory Muscles For Inspiration] : no accessory muscle use [Abdomen Tenderness] : non-tender [Costovertebral Angle Tenderness] : no ~M costovertebral angle tenderness [Urethral Meatus] : meatus normal [Urinary Bladder Findings] : the bladder was normal on palpation [Scrotum] : the scrotum was normal [Testes Mass (___cm)] : there were no testicular masses [No Prostate Nodules] : no prostate nodules [Normal Station and Gait] : the gait and station were normal for the patient's age [] : no rash [No Focal Deficits] : no focal deficits [Oriented To Time, Place, And Person] : oriented to person, place, and time [Mood] : the mood was normal [Affect] : the affect was normal [Not Anxious] : not anxious [No Palpable Adenopathy] : no palpable adenopathy

## 2020-05-26 NOTE — LETTER BODY
[FreeTextEntry1] : Emely Smith MD\par [address]\[place] [address]\par [phone]\par \par Dear Dr. Smith,\par \par Reason for visit: Balanitis.\par \par This is a 44 year-old Cantonese-speaking working male with hypertension and sleep apnea, presenting with balanitis. Patient is here for evaluation. Patient has difficulty maintaining hygiene. The patient denies any aggravating or relieving factors. He denies any hematuria or urinary incontinence. All other review of systems are negative. He has no cancer in his family medical history. He has no previous surgical history. Past medical history, family history and social history were inquired and were noncontributory to current condition. The patient does not use tobacco or drink alcohol. Medications and allergies were reviewed. He has no known allergies to medication. \par \par On examination, the patient is a healthy-appearing gentleman in no acute distress. He is alert and oriented follows commands. He has normal mood and affect. He is normocephalic. Neck is supple. Oral no thrush. Respirations are unlabored. His abdomen is soft and nontender. Bladder is nonpalpable. No CVA tenderness. Neurologically he is grossly intact. No peripheral edema. Skin without gross abnormality. He has normal male external genitalia. Normal meatus. Bilateral testes are descended intrascrotally and normal to palpation. On rectal examination, there is normal sphincter tone. The prostate is clinically benign without focal induration or nodularity. \par \par Assessment: Balanitis.\par \par I counseled the patient. I recommend he try Clotrimazole-Betamethasone topical cream to relieve his symptoms. I discussed the potential side effects of the medication. I counseled the patient on its use and side effects. If the patient develops any side effects, the patient will discontinue the medication and contact me. I educated the patient on proper hygiene and cleaning techniques to prevent future balanitis. He will obtain urinalysis, urine culture, STD panel, and renal ultrasound today to further evaluate. He will also obtain BMP and PSA today. Risks and alternatives were discussed. I answered the patient questions. The patient will follow-up as directed and will contact me with any questions or concerns. Thank you for the opportunity to participate in the care of Mr. GAMBLE. I will keep you updated on his progress.\par \par Plan: Clotrimazole-Betamethasone cream. BMP. PSA. Urinalysis. Culture. STD panel. Renal ultrasound. Follow up as directed.

## 2020-05-26 NOTE — LETTER BODY
[FreeTextEntry1] : Emely Smith MD\par [address] [facility] [address]\[place] [address]\par [phone]\par \par Dear Dr. Smith,\par \par Reason for visit: Balanitis.\par \par This is a 44 year-old Cantonese-speaking working male with hypertension and sleep apnea, presenting with balanitis. Patient is here for evaluation. Patient has difficulty maintaining hygiene. The patient denies any aggravating or relieving factors. He denies any hematuria or urinary incontinence. All other review of systems are negative. He has no cancer in his family medical history. He has no previous surgical history. Past medical history, family history and social history were inquired and were noncontributory to current condition. The patient does not use tobacco or drink alcohol. Medications and allergies were reviewed. He has no known allergies to medication. \par \par On examination, the patient is a healthy-appearing gentleman in no acute distress. He is alert and oriented follows commands. He has normal mood and affect. He is normocephalic. Neck is supple. Oral no thrush. Respirations are unlabored. His abdomen is soft and nontender. Bladder is nonpalpable. No CVA tenderness. Neurologically he is grossly intact. No peripheral edema. Skin without gross abnormality. He has normal male external genitalia. Normal meatus. Bilateral testes are descended intrascrotally and normal to palpation. On rectal examination, there is normal sphincter tone. The prostate is clinically benign without focal induration or nodularity. \par \par Assessment: Balanitis.\par \par I counseled the patient. I recommend he try Clotrimazole-Betamethasone topical cream to relieve his symptoms. I discussed the potential side effects of the medication. I counseled the patient on its use and side effects. If the patient develops any side effects, the patient will discontinue the medication and contact me. I educated the patient on proper hygiene and cleaning techniques to prevent future balanitis. He will obtain urinalysis, urine culture, STD panel, and renal ultrasound today to further evaluate. He will also obtain BMP and PSA today. Risks and alternatives were discussed. I answered the patient questions. The patient will follow-up as directed and will contact me with any questions or concerns. Thank you for the opportunity to participate in the care of Mr. GAMBLE. I will keep you updated on his progress.\par \par Plan: Clotrimazole-Betamethasone cream. BMP. PSA. Urinalysis. Culture. STD panel. Renal ultrasound. Follow up as directed.

## 2020-05-26 NOTE — ADDENDUM
[FreeTextEntry1] : Entered by Vikas Lugo, acting as scribe for Dr. Kwame Quan.\par \par The documentation recorded by the scribe accurately reflects the service I personally performed and the decisions made by me.

## 2020-05-27 LAB
ANION GAP SERPL CALC-SCNC: 16 MMOL/L
APPEARANCE: CLEAR
BACTERIA UR CULT: NORMAL
BACTERIA: NEGATIVE
BILIRUBIN URINE: NEGATIVE
BLOOD URINE: NEGATIVE
BUN SERPL-MCNC: 21 MG/DL
C TRACH RRNA SPEC QL NAA+PROBE: NOT DETECTED
CALCIUM SERPL-MCNC: 9.8 MG/DL
CHLORIDE SERPL-SCNC: 104 MMOL/L
CO2 SERPL-SCNC: 23 MMOL/L
COLOR: COLORLESS
CREAT SERPL-MCNC: 1.14 MG/DL
GLUCOSE QUALITATIVE U: NEGATIVE
GLUCOSE SERPL-MCNC: 112 MG/DL
HAV IGM SER QL: NONREACTIVE
HBV CORE IGM SER QL: NONREACTIVE
HBV SURFACE AG SER QL: NONREACTIVE
HCV AB SER QL: NONREACTIVE
HCV S/CO RATIO: 0.14 S/CO
HIV1+2 AB SPEC QL IA.RAPID: NONREACTIVE
HSV 1+2 IGG SER IA-IMP: NEGATIVE
HSV 1+2 IGG SER IA-IMP: POSITIVE
HSV1 IGG SER QL: 15.9 INDEX
HSV2 IGG SER QL: 0.11 INDEX
HYALINE CASTS: 0 /LPF
KETONES URINE: NEGATIVE
LEUKOCYTE ESTERASE URINE: NEGATIVE
MICROSCOPIC-UA: NORMAL
N GONORRHOEA RRNA SPEC QL NAA+PROBE: NOT DETECTED
NITRITE URINE: NEGATIVE
PH URINE: 6
POTASSIUM SERPL-SCNC: 4.5 MMOL/L
PROTEIN URINE: NEGATIVE
PSA FREE FLD-MCNC: 23 %
PSA FREE SERPL-MCNC: 0.32 NG/ML
PSA SERPL-MCNC: 1.38 NG/ML
RED BLOOD CELLS URINE: 0 /HPF
SODIUM SERPL-SCNC: 142 MMOL/L
SOURCE AMPLIFICATION: NORMAL
SPECIFIC GRAVITY URINE: 1.01
SQUAMOUS EPITHELIAL CELLS: 0 /HPF
T PALLIDUM AB SER QL IA: NEGATIVE
UROBILINOGEN URINE: NORMAL
WHITE BLOOD CELLS URINE: 0 /HPF

## 2020-06-06 LAB
MYCOPLASMA HOMINIS CULTURE: NORMAL
UREA SPECIMEN SOURCE: NORMAL
UREAPLASMA CULTURE: NORMAL
UREAPLASMA, PRELIMINARY CULTURE: NORMAL

## 2020-06-23 ENCOUNTER — APPOINTMENT (OUTPATIENT)
Dept: UROLOGY | Facility: CLINIC | Age: 45
End: 2020-06-23

## 2020-07-14 ENCOUNTER — APPOINTMENT (OUTPATIENT)
Dept: UROLOGY | Facility: CLINIC | Age: 45
End: 2020-07-14
Payer: COMMERCIAL

## 2020-07-14 VITALS — TEMPERATURE: 97.8 F

## 2020-07-14 PROCEDURE — 76775 US EXAM ABDO BACK WALL LIM: CPT

## 2020-07-14 PROCEDURE — 99214 OFFICE O/P EST MOD 30 MIN: CPT | Mod: 25

## 2020-07-14 NOTE — LETTER BODY
[FreeTextEntry1] : Emely Smith MD\par [address] [facility] [address]\[place] [address]\par [phone]\par [phone]\par \par Dear Dr. Smith,\par \par \par \par Reason for visit: Balanitis.  Flank pain\par \par This is a [age]  year-old man with previous flank pain and balanitis patient returns for followup. SInce he was last seen, patient reports resolution of balanitis with the Clotrimazole-betamethasone cream. Pattient denies any interval complaints or difficulties. He  denies any dysuria or gross hematuria.Patient denies any changes in health. The past medical history and family history and social history are unchanged. All other review of systems are negative. Patient denies any changes in medications. Medication list was reconciled.\par \par On examination, the patient is in no acute distress. He is alert and oriented follows commands. He has normal mood and affect. He is normocephalic. Oral no thrush. Neck is supple. Respirations are unlabored. His abdomen is soft and nontender. Liver is nonpalpable. Bladder is nonpalpable. No CVA tenderness. Neurologically he is grossly intact. No peripheral edema. Skin without gross abnormality. He has normal male external genitalia. Normal meatus. Bilateral testes are descended intrascrotally and normal to palpation. On rectal examination, there is normal sphincter tone. The prostate is clinically benign without focal induration or nodularity.\par \par I personally reviewed renal ultrasound with the patient today. Images demonstrated no evidence of stones, solid masses or hydronephrosis. \par \par Assessment: Balanitis.\par \par I counseled the patient.  I reassured the patient.  I encouraged him to use the Clotrimazole-betamethasone cream as needed.  He may consider circumcision if he has persistent symptoms.  There is no evidence of any renal calculi or hydronephrosis on the renal ultrasound.  I asked him to contact me if his condition changes. Risks and alternatives were discussed. I answered the patient questions. The patient will follow-up as directed and will contact me with any questions or concerns. Thank you for the opportunity to participate in the care of this patient. I'll keep you updated on his progress.\par \par Plan: Follow-up 1 year. Clotrimazole-betamethasone cream prin

## 2020-08-06 ENCOUNTER — APPOINTMENT (OUTPATIENT)
Dept: THORACIC SURGERY | Facility: CLINIC | Age: 45
End: 2020-08-06

## 2020-08-20 ENCOUNTER — APPOINTMENT (OUTPATIENT)
Dept: THORACIC SURGERY | Facility: CLINIC | Age: 45
End: 2020-08-20

## 2020-10-18 ENCOUNTER — TRANSCRIPTION ENCOUNTER (OUTPATIENT)
Age: 45
End: 2020-10-18

## 2020-10-29 NOTE — ASU PREOP CHECKLIST - HEIGHT IN INCHES
6 Complex Repair And Graft Additional Text (Will Appearing After The Standard Complex Repair Text): The complex repair was not sufficient to completely close the primary defect. The remaining additional defect was repaired with the graft mentioned below.

## 2020-11-05 ENCOUNTER — APPOINTMENT (OUTPATIENT)
Dept: THORACIC SURGERY | Facility: CLINIC | Age: 45
End: 2020-11-05
Payer: COMMERCIAL

## 2020-11-05 VITALS
BODY MASS INDEX: 32.61 KG/M2 | HEART RATE: 94 BPM | SYSTOLIC BLOOD PRESSURE: 145 MMHG | OXYGEN SATURATION: 98 % | WEIGHT: 190 LBS | RESPIRATION RATE: 18 BRPM | DIASTOLIC BLOOD PRESSURE: 103 MMHG | TEMPERATURE: 97.8 F

## 2020-11-05 VITALS — SYSTOLIC BLOOD PRESSURE: 143 MMHG | DIASTOLIC BLOOD PRESSURE: 91 MMHG

## 2020-11-05 PROCEDURE — 99214 OFFICE O/P EST MOD 30 MIN: CPT

## 2020-11-05 PROCEDURE — 99072 ADDL SUPL MATRL&STAF TM PHE: CPT

## 2020-11-06 NOTE — PHYSICAL EXAM
[Auscultation Breath Sounds / Voice Sounds] : lungs were clear to auscultation bilaterally [Heart Rate And Rhythm] : heart rate was normal and rhythm regular [Heart Sounds] : normal S1 and S2 [Heart Sounds Gallop] : no gallops [Murmurs] : no murmurs [Heart Sounds Pericardial Friction Rub] : no pericardial rub [Examination Of The Chest] : the chest was normal in appearance [Chest Visual Inspection Thoracic Asymmetry] : no chest asymmetry [Diminished Respiratory Excursion] : normal chest expansion [2+] : left 2+ [Bowel Sounds] : normal bowel sounds [Abdomen Soft] : soft [Abdomen Tenderness] : non-tender [Abdomen Mass (___ Cm)] : no abdominal mass palpated [Skin Color & Pigmentation] : normal skin color and pigmentation [Skin Turgor] : normal skin turgor [] : no rash [Deep Tendon Reflexes (DTR)] : deep tendon reflexes were 2+ and symmetric [Sensation] : the sensory exam was normal to light touch and pinprick [No Focal Deficits] : no focal deficits [Oriented To Time, Place, And Person] : oriented to person, place, and time [Impaired Insight] : insight and judgment were intact [Affect] : the affect was normal
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
## 2020-11-08 NOTE — HISTORY OF PRESENT ILLNESS
[FreeTextEntry1] : Mr. TYLER GAMBLE, 45 year old male, former smoker, w/ hx of HTN, VIDHI, who presented to [facility] Dr. Jonny Kaufman c/o dry cough x 2-3 months after having episode of choking when eating food, treated for PNA with Z-James and Levaquin.\par \par Flex Bronch bx on 3/7/19 by Dr. Kaufman showed a polypoid lesion in the RLL w/ copious amount of purulent sputum. Path showed vegetable material and acutely and chronically inflamed benign bronchial mucosa.\par \par CT Chest on 7/9/19:\par - a decreasing size 5.1 x 2.7cm consolidation (series 3 image 42) in the RLL consolidation (Previously 7.8 x 2.7cm)\par - RLL bronchus w/ a persistent focal narrowing (series 3 image 33)\par \par Now 1 yr 3 mo s/p FB bx of RLL endobronchial lesion and BAL on 8/6/19. Bronch of the Rt side showed endobronchial lesion of RLL basal segmental bronchus, no foreign body seen. Path of RLL transbronchial bx showed severe acute and chronic bronchitis. RLL BAL negative for malignancy. \par \par Patient c/o hemoptysis. Now 1 yr s/p Diagnostic FB. Electrocautery ablation of RLL endobronchial obstructive lesions. Bx of RLL endobronchial obstructive lesions on 10/31/2019. Path of RLL bx revealed severe acute and chronic bronchitis with granulation tissue. \par \par Now 8 months s/p FB, BAL, and EBBX, fulguration on 03/03/2020.  Path of RLL bx revealed benign bronchial wall tissue with marked acute and chronic inflammation. GMS negative. \par \par Patient is here today for a follow up. Patient c/o on/off hemoptysis and on/off SOB.

## 2020-11-08 NOTE — CONSULT LETTER
[FreeTextEntry2] : Jonny Kaufman MD (Pul/Referring)  [FreeTextEntry3] : Michael Amato MD, MPH \par System Director of Thoracic Surgery \par Director of Comprehensive Lung and Foregut [place] \par Professor Cardiovascular & Thoracic Surgery \par [facility] [employer] [facility]\par \par

## 2020-11-08 NOTE — DATA REVIEWED
[FreeTextEntry1] : \par Now 5 months s/p FB, BAL, and EBBX, fulguration on 03/03/2020.  Path of RLL bx revealed benign bronchial wall tissue with marked acute and chronic inflammation. GMS negative.

## 2020-11-12 ENCOUNTER — APPOINTMENT (OUTPATIENT)
Dept: THORACIC SURGERY | Facility: CLINIC | Age: 45
End: 2020-11-12
Payer: COMMERCIAL

## 2020-11-12 VITALS
TEMPERATURE: 97.3 F | HEART RATE: 86 BPM | SYSTOLIC BLOOD PRESSURE: 145 MMHG | WEIGHT: 190 LBS | BODY MASS INDEX: 32.61 KG/M2 | OXYGEN SATURATION: 97 % | RESPIRATION RATE: 18 BRPM | DIASTOLIC BLOOD PRESSURE: 83 MMHG

## 2020-11-12 DIAGNOSIS — J18.1 LOBAR PNEUMONIA, UNSPECIFIED ORGANISM: ICD-10-CM

## 2020-11-12 PROCEDURE — 99214 OFFICE O/P EST MOD 30 MIN: CPT

## 2020-11-12 PROCEDURE — 99072 ADDL SUPL MATRL&STAF TM PHE: CPT

## 2020-11-13 ENCOUNTER — APPOINTMENT (OUTPATIENT)
Dept: DISASTER EMERGENCY | Facility: CLINIC | Age: 45
End: 2020-11-13

## 2020-11-13 DIAGNOSIS — Z01.818 ENCOUNTER FOR OTHER PREPROCEDURAL EXAMINATION: ICD-10-CM

## 2020-11-13 PROBLEM — J18.1 LUNG CONSOLIDATION: Status: ACTIVE | Noted: 2019-07-31

## 2020-11-13 NOTE — PHYSICAL EXAM
[Auscultation Breath Sounds / Voice Sounds] : lungs were clear to auscultation bilaterally [Heart Rate And Rhythm] : heart rate was normal and rhythm regular [Heart Sounds] : normal S1 and S2 [Heart Sounds Gallop] : no gallops [Murmurs] : no murmurs [Heart Sounds Pericardial Friction Rub] : no pericardial rub [Examination Of The Chest] : the chest was normal in appearance [Chest Visual Inspection Thoracic Asymmetry] : no chest asymmetry [Diminished Respiratory Excursion] : normal chest expansion [No CVA Tenderness] : no ~M costovertebral angle tenderness [No Spinal Tenderness] : no spinal tenderness [Skin Color & Pigmentation] : normal skin color and pigmentation [Skin Turgor] : normal skin turgor [] : no rash [Oriented To Time, Place, And Person] : oriented to person, place, and time [Impaired Insight] : insight and judgment were intact [Affect] : the affect was normal

## 2020-11-14 LAB — SARS-COV-2 N GENE NPH QL NAA+PROBE: NOT DETECTED

## 2020-11-16 ENCOUNTER — RESULT REVIEW (OUTPATIENT)
Age: 45
End: 2020-11-16

## 2020-11-16 ENCOUNTER — APPOINTMENT (OUTPATIENT)
Dept: THORACIC SURGERY | Facility: HOSPITAL | Age: 45
End: 2020-11-16

## 2020-11-16 ENCOUNTER — APPOINTMENT (OUTPATIENT)
Dept: DISASTER EMERGENCY | Facility: CLINIC | Age: 45
End: 2020-11-16

## 2020-11-16 ENCOUNTER — OUTPATIENT (OUTPATIENT)
Dept: OUTPATIENT SERVICES | Facility: HOSPITAL | Age: 45
LOS: 1 days | Discharge: HOME | End: 2020-11-16
Payer: COMMERCIAL

## 2020-11-16 VITALS
OXYGEN SATURATION: 99 % | HEART RATE: 81 BPM | RESPIRATION RATE: 14 BRPM | TEMPERATURE: 99 F | SYSTOLIC BLOOD PRESSURE: 122 MMHG | DIASTOLIC BLOOD PRESSURE: 71 MMHG

## 2020-11-16 VITALS
DIASTOLIC BLOOD PRESSURE: 78 MMHG | OXYGEN SATURATION: 97 % | HEART RATE: 78 BPM | RESPIRATION RATE: 14 BRPM | TEMPERATURE: 98 F | WEIGHT: 184.09 LBS | SYSTOLIC BLOOD PRESSURE: 135 MMHG | HEIGHT: 66 IN

## 2020-11-16 DIAGNOSIS — J18.1 LOBAR PNEUMONIA, UNSPECIFIED ORGANISM: ICD-10-CM

## 2020-11-16 DIAGNOSIS — Z98.890 OTHER SPECIFIED POSTPROCEDURAL STATES: Chronic | ICD-10-CM

## 2020-11-16 LAB
GRAM STN FLD: SIGNIFICANT CHANGE UP
GRAM STN FLD: SIGNIFICANT CHANGE UP
SPECIMEN SOURCE: SIGNIFICANT CHANGE UP
SPECIMEN SOURCE: SIGNIFICANT CHANGE UP

## 2020-11-16 PROCEDURE — 88112 CYTOPATH CELL ENHANCE TECH: CPT | Mod: 26

## 2020-11-16 PROCEDURE — 71045 X-RAY EXAM CHEST 1 VIEW: CPT | Mod: 26

## 2020-11-16 PROCEDURE — 31625 BRONCHOSCOPY W/BIOPSY(S): CPT | Mod: 59

## 2020-11-16 PROCEDURE — 31624 DX BRONCHOSCOPE/LAVAGE: CPT

## 2020-11-16 PROCEDURE — 88305 TISSUE EXAM BY PATHOLOGIST: CPT | Mod: 26,59

## 2020-11-16 PROCEDURE — 31645 BRNCHSC W/THER ASPIR 1ST: CPT

## 2020-11-16 PROCEDURE — 31641 BRONCHOSCOPY TREAT BLOCKAGE: CPT

## 2020-11-16 PROCEDURE — 88305 TISSUE EXAM BY PATHOLOGIST: CPT | Mod: 26

## 2020-11-16 RX ORDER — AZILSARTAN KAMEDOXOMIL 40 MG/1
1 TABLET ORAL
Qty: 0 | Refills: 0 | DISCHARGE

## 2020-11-16 RX ORDER — FENTANYL CITRATE 50 UG/ML
50 INJECTION INTRAVENOUS
Refills: 0 | Status: DISCONTINUED | OUTPATIENT
Start: 2020-11-16 | End: 2020-11-16

## 2020-11-16 RX ORDER — ONDANSETRON 8 MG/1
4 TABLET, FILM COATED ORAL ONCE
Refills: 0 | Status: DISCONTINUED | OUTPATIENT
Start: 2020-11-16 | End: 2020-11-30

## 2020-11-16 RX ORDER — SODIUM CHLORIDE 9 MG/ML
1000 INJECTION, SOLUTION INTRAVENOUS
Refills: 0 | Status: DISCONTINUED | OUTPATIENT
Start: 2020-11-16 | End: 2020-11-30

## 2020-11-16 NOTE — H&P ADULT - NSHPSOCIALHISTORY_GEN_ALL_CORE
Family History  Denied: Family history of malignant neoplasm : Mother, Father  Family history of malignant neoplasm of prostate (V16.42) (Z80.42) : Paternal  Grandfather    Social History  Former smoker (V15.82) (Z87.891)   · 1PPD for 25 yrs, quit 7 month ago  No illicit drug use  Occupation  Primary spoken language Cantonese  Social alcohol use (Z78.9)

## 2020-11-16 NOTE — ASU DISCHARGE PLAN (ADULT/PEDIATRIC) - CALL YOUR DOCTOR IF YOU HAVE ANY OF THE FOLLOWING:
shortness of breath, it is normal to cuogh up small amounts of blood tinged fluid/Bleeding that does not stop/Fever greater than (need to indicate Fahrenheit or Celsius) Inability to tolerate liquids or foods/Nausea and vomiting that does not stop/Unable to urinate/Bleeding that does not stop/Fever greater than (need to indicate Fahrenheit or Celsius)/shortness of breath, it is normal to cuogh up small amounts of blood tinged fluid/Pain not relieved by Medications
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
## 2020-11-16 NOTE — ASU DISCHARGE PLAN (ADULT/PEDIATRIC) - FOLLOW UP APPOINTMENTS
911 or go to the nearest Emergency Room may also call Recovery Room (PACU) 24/7 @ [phone]/[facility] ASU (Adult):

## 2020-11-16 NOTE — DATA REVIEWED
[FreeTextEntry1] : CT chest on 11/07/2020:\par - again noted is a patchy consolidation involving the anterior RLL, overall not significantly changed from 10/28/2019 with slight increase in post obstructive distal atelectasis and a filling defect within the proximal aspect of the bronchus (3:85-92)\par

## 2020-11-16 NOTE — H&P ADULT - NSHPREVIEWOFSYSTEMS_GEN_ALL_CORE
Respiratory: as noted in HPI.   Constitutional, Eyes, ENT, Cardiovascular, Gastrointestinal, Genitourinary, Musculoskeletal, Integumentary, Neurological, Psychiatric, Endocrine and Heme/Lymph are otherwise negative.

## 2020-11-16 NOTE — ASU DISCHARGE PLAN (ADULT/PEDIATRIC) - ASU DC SPECIAL INSTRUCTIONSFT
Please call Dr Amato's office to schedule a follow up visit in 2 weeks. Please have a chest xray done 1-2 days prior to your appointment with Dr Amato, and the it with you to the office

## 2020-11-16 NOTE — BRIEF OPERATIVE NOTE - NSICDXBRIEFPROCEDURE_GEN_ALL_CORE_FT
PROCEDURES:  Bronchoscopy, with bronchial biopsy 16-Nov-2020 14:00:47 with electrocautery fulguration of right lower lobe bronchial stenossi Dwight Lozano

## 2020-11-16 NOTE — ASSESSMENT
[FreeTextEntry1] : Mr. TYLER GAMBLE, 45 year old male, former smoker, w/ hx of HTN, VIDHI, who presented to [facility]auf[facility] c/o dry cough x 2-3 months after having episode of choking when eating food, treated for PNA with Z-James and Levaquin.\par \par Flex Bronch bx on 3/7/19 by Dr. Kaufman showed a polypoid lesion in the RLL w/ copious amount of purulent sputum. Path showed vegetable material and acutely and chronically inflamed benign bronchial mucosa.\par \par CT Chest on 7/9/19:\par - a decreasing size 5.1 x 2.7cm consolidation (series 3 image 42) in the RLL consolidation (Previously 7.8 x 2.7cm)\par - RLL bronchus w/ a persistent focal narrowing (series 3 image 33)\par \par Now 1 yr 3 mo s/p FB bx of RLL endobronchial lesion and BAL on 8/6/19. Bronch of the Rt side showed endobronchial lesion of RLL basal segmental bronchus, no foreign body seen. Path of RLL transbronchial bx showed severe acute and chronic bronchitis. RLL BAL negative for malignancy. \par \par Patient c/o hemoptysis. Now 1 yr s/p Diagnostic FB. Electrocautery ablation of RLL endobronchial obstructive lesions. Bx of RLL endobronchial obstructive lesions on 10/31/2019. Path of RLL bx revealed severe acute and chronic bronchitis with granulation tissue. \par \par Now 8 months s/p FB, BAL, and EBBX, fulguration on 03/03/2020. Path of RLL bx revealed benign bronchial wall tissue with marked acute and chronic inflammation. GMS negative. \par \par Patient was seen on 11/05/2020, I recommended Flex Bronchoscopy,  Yag Laser Ablation of endobronchial lesion on 11/19/2020 and CT chest prior procedure. \par \par CT chest on 11/07/2020:\par - again noted is a patchy consolidation involving the anterior RLL, overall not significantly changed from 10/28/2019 with slight increase in post obstructive distal atelectasis and a filling defect within the proximal aspect of the bronchus (3:85-92)\par \par I have reviewed the patient's medical records and diagnostic images at time of this office consultation and have made the following recommendation:\par 1. CT chest reviewed and explained to patient and his family member, I recommended to proceed procedure as scheduled on 11/16/2020 (Flex Bronchoscopy Yag Laser Ablation of endobronchial lesion). Risks and benefits and alternatives explained to patient, all questions answered, patient agreed to proceed with surgery.\par 2. We also discuss possible surgical resection for persistent RLL consolidation. \par \par I personally performed the services described in the documentation, reviewed the documentation recorded by the scribe in my presence and it accurately and completely records my words and actions.\par \par I, Jess De La Garza NP, am scribing for and the presence of GENESIS Dewey, the following sections HISTORY OF PRESENT ILLNESS, PAST MEDICAL/FAMILY/SOCIAL HISTORY; REVIEW OF SYSTEMS; VITAL SIGNS; PHYSICAL EXAM; DISPOSITION.

## 2020-11-16 NOTE — CONSULT LETTER
[Please see my note below.] : Please see my note below. [Consult Closing:] : Thank you very much for allowing me to participate in the care of this patient.  If you have any questions, please do not hesitate to contact me. [Sincerely,] : Sincerely, [Dear  ___] : Dear  [unfilled], [Consult Letter:] : I had the pleasure of evaluating your patient, [unfilled]. [( Thank you for referring [unfilled] for consultation for _____ )] : Thank you for referring [unfilled] for consultation for [unfilled] [FreeTextEntry2] : Jonny Kaufman MD (Pul/Referring)  [FreeTextEntry3] : Michael Amato MD, MPH \par System Director of Thoracic Surgery \par Director of Comprehensive Lung and Foregut [place] \par Professor Cardiovascular & Thoracic Surgery \par [facility] [employer] at [facility]

## 2020-11-16 NOTE — HISTORY OF PRESENT ILLNESS
[FreeTextEntry1] : Mr. TYLER GAMBLE, 45 year old male, former smoker, w/ hx of HTN, VIDHI, who presented to [facility]auf[facility] c/o dry cough x 2-3 months after having episode of choking when eating food, treated for PNA with Z-James and Levaquin.\par \par Flex Bronch bx on 3/7/19 by Dr. Kaufman showed a polypoid lesion in the RLL w/ copious amount of purulent sputum. Path showed vegetable material and acutely and chronically inflamed benign bronchial mucosa.\par \par CT Chest on 7/9/19:\par - a decreasing size 5.1 x 2.7cm consolidation (series 3 image 42) in the RLL consolidation (Previously 7.8 x 2.7cm)\par - RLL bronchus w/ a persistent focal narrowing (series 3 image 33)\par \par Now 1 yr 3 mo s/p FB bx of RLL endobronchial lesion and BAL on 8/6/19. Bronch of the Rt side showed endobronchial lesion of RLL basal segmental bronchus, no foreign body seen. Path of RLL transbronchial bx showed severe acute and chronic bronchitis. RLL BAL negative for malignancy. \par \par Patient c/o hemoptysis. Now 1 yr s/p Diagnostic FB. Electrocautery ablation of RLL endobronchial obstructive lesions. Bx of RLL endobronchial obstructive lesions on 10/31/2019. Path of RLL bx revealed severe acute and chronic bronchitis with granulation tissue. \par \par Now 8 months s/p FB, BAL, and EBBX, fulguration on 03/03/2020. Path of RLL bx revealed benign bronchial wall tissue with marked acute and chronic inflammation. GMS negative. \par \par Patient was seen on 11/05/2020, I recommended Flex Bronchoscopy,  Yag Laser Ablation of endobronchial lesion on 11/19/2020 and CT chest prior procedure. \par \par CT chest on 11/07/2020:\par - again noted is a patchy consolidation involving the anterior RLL, overall not significantly changed from 10/28/2019 with slight increase in post obstructive distal atelectasis and a filling defect within the proximal aspect of the bronchus (3:85-92)\par \par Patient is here today for a follow up. Patient c/o on/off hemoptysis, denies shortness of breath, cough, chest pain, fever, chill.
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
## 2020-11-16 NOTE — H&P ADULT - HISTORY OF PRESENT ILLNESS
43 yo male with hx of lung consolidiation after choking on food in 11/2018. Pt was coughing up blood, underwent treatment with antibiotics, s/p bronchoscopies with biopsies and ablation, last in 10/2019 . Presents today for presurgical evaluation for Flexible Bronchoscopy, Electrocautery Ablation scheduled on 11/17/2020    History of Present Illness  Mr. TYLER GAMBLE, 45 year old male, former smoker, w/ hx of HTN, VIDHI, who presented to [facility] Dr. Jonny Kaufman c/o dry cough x 2-3 months after having episode of choking when eating food, treated for PNA with Z-James and Levaquin.    Flex Bronch bx on 3/7/19 by Dr. Kaufman showed a polypoid lesion in the RLL w/ copious amount of purulent sputum. Path showed vegetable material and acutely and chronically inflamed benign bronchial mucosa.    CT Chest on 7/9/19:  - a decreasing size 5.1 x 2.7cm consolidation (series 3 image 42) in the RLL consolidation (Previously 7.8 x 2.7cm)  - RLL bronchus w/ a persistent focal narrowing (series 3 image 33)    Now 1 yr 3 mo s/p FB bx of RLL endobronchial lesion and BAL on 8/6/19. Bronch of the Rt side showed endobronchial lesion of RLL basal segmental bronchus, no foreign body seen. Path of RLL transbronchial bx showed severe acute and chronic bronchitis. RLL BAL negative for malignancy.     Patient c/o hemoptysis. Now 1 yr s/p Diagnostic FB. Electrocautery ablation of RLL endobronchial obstructive lesions. Bx of RLL endobronchial obstructive lesions on 10/31/2019. Path of RLL bx revealed severe acute and chronic bronchitis with granulation tissue.     Now 8 months s/p FB, BAL, and EBBX, fulguration on 03/03/2020. Path of RLL bx revealed benign bronchial wall tissue with marked acute and chronic inflammation. GMS negative.     Patient was seen on 11/05/2020, I recommended Flex Bronchoscopy, Yag Laser Ablation of endobronchial lesion on 11/19/2020 and CT chest prior procedure.     CT chest on 11/07/2020:  - again noted is a patchy consolidation involving the anterior RLL, overall not significantly changed from 10/28/2019 with slight increase in post obstructive distal atelectasis and a filling defect within the proximal aspect of the bronchus (3:85-92)

## 2020-11-16 NOTE — BRIEF OPERATIVE NOTE - OPERATION/FINDINGS
Flexible bronchoscopy, with cold forceps biopsy of right lower lobe anterior segment bronchus, bronchial alveolar lavage of right lower lobe, and electrocautery fulguration of right lower lobe anterior segmental bronchus.  Bronchus was obstructed from inflammed bronchial tissue with purulent fluid behind it. Fluid was sent for lavage x2.  Bronchus was patent and hemostatic at case end. Other bronchi were unremarkable

## 2020-11-16 NOTE — ASU DISCHARGE PLAN (ADULT/PEDIATRIC) - CARE PROVIDER_API CALL
Michael Amato  SURGERY  [address] [place] [address] [place] [address]  Phone: [phone]  Fax: [phone]  Follow Up Time:

## 2020-11-17 LAB
NIGHT BLUE STAIN TISS: SIGNIFICANT CHANGE UP
NIGHT BLUE STAIN TISS: SIGNIFICANT CHANGE UP
SPECIMEN SOURCE: SIGNIFICANT CHANGE UP
SPECIMEN SOURCE: SIGNIFICANT CHANGE UP

## 2020-11-18 LAB
CULTURE RESULTS: SIGNIFICANT CHANGE UP
CULTURE RESULTS: SIGNIFICANT CHANGE UP
NON-GYNECOLOGICAL CYTOLOGY STUDY: SIGNIFICANT CHANGE UP
SPECIMEN SOURCE: SIGNIFICANT CHANGE UP
SPECIMEN SOURCE: SIGNIFICANT CHANGE UP
SURGICAL PATHOLOGY STUDY: SIGNIFICANT CHANGE UP

## 2020-11-24 ENCOUNTER — EMERGENCY (EMERGENCY)
Facility: HOSPITAL | Age: 45
LOS: 1 days | Discharge: LEFT BEFORE DISCHARGE | End: 2020-11-24
Admitting: EMERGENCY MEDICINE
Payer: COMMERCIAL

## 2020-11-24 ENCOUNTER — NON-APPOINTMENT (OUTPATIENT)
Age: 45
End: 2020-11-24

## 2020-11-24 ENCOUNTER — TRANSCRIPTION ENCOUNTER (OUTPATIENT)
Age: 45
End: 2020-11-24

## 2020-11-24 VITALS — HEIGHT: 66 IN

## 2020-11-24 DIAGNOSIS — Z98.890 OTHER SPECIFIED POSTPROCEDURAL STATES: Chronic | ICD-10-CM

## 2020-11-24 PROCEDURE — L9991: CPT

## 2020-11-25 ENCOUNTER — INPATIENT (INPATIENT)
Facility: HOSPITAL | Age: 45
LOS: 6 days | Discharge: HOME | End: 2020-12-02
Attending: THORACIC SURGERY (CARDIOTHORACIC VASCULAR SURGERY) | Admitting: THORACIC SURGERY (CARDIOTHORACIC VASCULAR SURGERY)
Payer: COMMERCIAL

## 2020-11-25 ENCOUNTER — RESULT REVIEW (OUTPATIENT)
Age: 45
End: 2020-11-25

## 2020-11-25 VITALS
OXYGEN SATURATION: 83 % | HEIGHT: 66 IN | DIASTOLIC BLOOD PRESSURE: 76 MMHG | HEART RATE: 71 BPM | RESPIRATION RATE: 14 BRPM | SYSTOLIC BLOOD PRESSURE: 156 MMHG | TEMPERATURE: 98 F

## 2020-11-25 DIAGNOSIS — Z98.890 OTHER SPECIFIED POSTPROCEDURAL STATES: Chronic | ICD-10-CM

## 2020-11-25 DIAGNOSIS — R04.2 HEMOPTYSIS: ICD-10-CM

## 2020-11-25 LAB
ALBUMIN SERPL ELPH-MCNC: 3.3 G/DL — SIGNIFICANT CHANGE UP (ref 3.3–5)
ALBUMIN SERPL ELPH-MCNC: 4.4 G/DL — SIGNIFICANT CHANGE UP (ref 3.3–5)
ALP SERPL-CCNC: 51 U/L — SIGNIFICANT CHANGE UP (ref 40–120)
ALP SERPL-CCNC: 70 U/L — SIGNIFICANT CHANGE UP (ref 40–120)
ALT FLD-CCNC: 17 U/L — SIGNIFICANT CHANGE UP (ref 4–41)
ALT FLD-CCNC: 24 U/L — SIGNIFICANT CHANGE UP (ref 4–41)
ANION GAP SERPL CALC-SCNC: 10 MMO/L — SIGNIFICANT CHANGE UP (ref 7–14)
ANION GAP SERPL CALC-SCNC: 11 MMO/L — SIGNIFICANT CHANGE UP (ref 7–14)
ANION GAP SERPL CALC-SCNC: 9 MMO/L — SIGNIFICANT CHANGE UP (ref 7–14)
ANION GAP SERPL CALC-SCNC: 9 MMO/L — SIGNIFICANT CHANGE UP (ref 7–14)
ANISOCYTOSIS BLD QL: SLIGHT — SIGNIFICANT CHANGE UP
APTT BLD: 32.4 SEC — SIGNIFICANT CHANGE UP (ref 27–36.3)
AST SERPL-CCNC: 13 U/L — SIGNIFICANT CHANGE UP (ref 4–40)
AST SERPL-CCNC: 13 U/L — SIGNIFICANT CHANGE UP (ref 4–40)
B PERT DNA SPEC QL NAA+PROBE: SIGNIFICANT CHANGE UP
BASE EXCESS BLDA CALC-SCNC: -3.4 MMOL/L — SIGNIFICANT CHANGE UP
BASE EXCESS BLDA CALC-SCNC: -3.5 MMOL/L — SIGNIFICANT CHANGE UP
BASE EXCESS BLDA CALC-SCNC: -3.7 MMOL/L — SIGNIFICANT CHANGE UP
BASE EXCESS BLDA CALC-SCNC: -5.4 MMOL/L — SIGNIFICANT CHANGE UP
BASOPHILS # BLD AUTO: 0.01 K/UL — SIGNIFICANT CHANGE UP (ref 0–0.2)
BASOPHILS # BLD AUTO: 0.04 K/UL — SIGNIFICANT CHANGE UP (ref 0–0.2)
BASOPHILS NFR BLD AUTO: 0.1 % — SIGNIFICANT CHANGE UP (ref 0–2)
BASOPHILS NFR BLD AUTO: 0.2 % — SIGNIFICANT CHANGE UP (ref 0–2)
BASOPHILS NFR SPEC: 0 % — SIGNIFICANT CHANGE UP (ref 0–2)
BILIRUB DIRECT SERPL-MCNC: 0.4 MG/DL — HIGH (ref 0.1–0.2)
BILIRUB SERPL-MCNC: 1 MG/DL — SIGNIFICANT CHANGE UP (ref 0.2–1.2)
BILIRUB SERPL-MCNC: 1.1 MG/DL — SIGNIFICANT CHANGE UP (ref 0.2–1.2)
BILIRUB SERPL-MCNC: 1.1 MG/DL — SIGNIFICANT CHANGE UP (ref 0.2–1.2)
BLD GP AB SCN SERPL QL: NEGATIVE — SIGNIFICANT CHANGE UP
BLD GP AB SCN SERPL QL: NEGATIVE — SIGNIFICANT CHANGE UP
BLOOD GAS ARTERIAL - FIO2: 50 — SIGNIFICANT CHANGE UP
BUN SERPL-MCNC: 12 MG/DL — SIGNIFICANT CHANGE UP (ref 7–23)
BUN SERPL-MCNC: 14 MG/DL — SIGNIFICANT CHANGE UP (ref 7–23)
BUN SERPL-MCNC: 15 MG/DL — SIGNIFICANT CHANGE UP (ref 7–23)
BUN SERPL-MCNC: 17 MG/DL — SIGNIFICANT CHANGE UP (ref 7–23)
C PNEUM DNA SPEC QL NAA+PROBE: SIGNIFICANT CHANGE UP
CA-I BLDA-SCNC: 1.14 MMOL/L — LOW (ref 1.15–1.29)
CA-I BLDA-SCNC: 1.16 MMOL/L — SIGNIFICANT CHANGE UP (ref 1.15–1.29)
CA-I BLDA-SCNC: 1.19 MMOL/L — SIGNIFICANT CHANGE UP (ref 1.15–1.29)
CALCIUM SERPL-MCNC: 7.9 MG/DL — LOW (ref 8.4–10.5)
CALCIUM SERPL-MCNC: 8.1 MG/DL — LOW (ref 8.4–10.5)
CALCIUM SERPL-MCNC: 8.1 MG/DL — LOW (ref 8.4–10.5)
CALCIUM SERPL-MCNC: 9.6 MG/DL — SIGNIFICANT CHANGE UP (ref 8.4–10.5)
CHLORIDE BLDA-SCNC: 109 MMOL/L — HIGH (ref 96–108)
CHLORIDE SERPL-SCNC: 103 MMOL/L — SIGNIFICANT CHANGE UP (ref 98–107)
CHLORIDE SERPL-SCNC: 105 MMOL/L — SIGNIFICANT CHANGE UP (ref 98–107)
CHLORIDE SERPL-SCNC: 106 MMOL/L — SIGNIFICANT CHANGE UP (ref 98–107)
CHLORIDE SERPL-SCNC: 99 MMOL/L — SIGNIFICANT CHANGE UP (ref 98–107)
CK SERPL-CCNC: 121 U/L — SIGNIFICANT CHANGE UP (ref 30–200)
CK SERPL-CCNC: 203 U/L — HIGH (ref 30–200)
CO2 SERPL-SCNC: 20 MMOL/L — LOW (ref 22–31)
CO2 SERPL-SCNC: 21 MMOL/L — LOW (ref 22–31)
CO2 SERPL-SCNC: 22 MMOL/L — SIGNIFICANT CHANGE UP (ref 22–31)
CO2 SERPL-SCNC: 25 MMOL/L — SIGNIFICANT CHANGE UP (ref 22–31)
CREAT BLDA-MCNC: 1.43 MG/DL — HIGH (ref 0.5–1.3)
CREAT SERPL-MCNC: 1.1 MG/DL — SIGNIFICANT CHANGE UP (ref 0.5–1.3)
CREAT SERPL-MCNC: 1.15 MG/DL — SIGNIFICANT CHANGE UP (ref 0.5–1.3)
CREAT SERPL-MCNC: 1.15 MG/DL — SIGNIFICANT CHANGE UP (ref 0.5–1.3)
CREAT SERPL-MCNC: 1.48 MG/DL — HIGH (ref 0.5–1.3)
DAT C3-SP REAG RBC QL: NEGATIVE — SIGNIFICANT CHANGE UP
DAT POLY-SP REAG RBC QL: NEGATIVE — SIGNIFICANT CHANGE UP
DIRECT COOMBS IGG: NEGATIVE — SIGNIFICANT CHANGE UP
EOSINOPHIL # BLD AUTO: 0.01 K/UL — SIGNIFICANT CHANGE UP (ref 0–0.5)
EOSINOPHIL # BLD AUTO: 0.01 K/UL — SIGNIFICANT CHANGE UP (ref 0–0.5)
EOSINOPHIL NFR BLD AUTO: 0 % — SIGNIFICANT CHANGE UP (ref 0–6)
EOSINOPHIL NFR BLD AUTO: 0.1 % — SIGNIFICANT CHANGE UP (ref 0–6)
EOSINOPHIL NFR FLD: 1 % — SIGNIFICANT CHANGE UP (ref 0–6)
FLUAV H1 2009 PAND RNA SPEC QL NAA+PROBE: SIGNIFICANT CHANGE UP
FLUAV H1 RNA SPEC QL NAA+PROBE: SIGNIFICANT CHANGE UP
FLUAV H3 RNA SPEC QL NAA+PROBE: SIGNIFICANT CHANGE UP
FLUAV SUBTYP SPEC NAA+PROBE: SIGNIFICANT CHANGE UP
FLUBV RNA SPEC QL NAA+PROBE: SIGNIFICANT CHANGE UP
GLUCOSE BLDA-MCNC: 135 MG/DL — HIGH (ref 70–99)
GLUCOSE BLDA-MCNC: 139 MG/DL — HIGH (ref 70–99)
GLUCOSE BLDA-MCNC: 150 MG/DL — HIGH (ref 70–99)
GLUCOSE BLDA-MCNC: 153 MG/DL — HIGH (ref 70–99)
GLUCOSE SERPL-MCNC: 103 MG/DL — HIGH (ref 70–99)
GLUCOSE SERPL-MCNC: 139 MG/DL — HIGH (ref 70–99)
GLUCOSE SERPL-MCNC: 144 MG/DL — HIGH (ref 70–99)
GLUCOSE SERPL-MCNC: 149 MG/DL — HIGH (ref 70–99)
GRAM STN FLD: SIGNIFICANT CHANGE UP
GRAM STN FLD: SIGNIFICANT CHANGE UP
HADV DNA SPEC QL NAA+PROBE: SIGNIFICANT CHANGE UP
HAPTOGLOB SERPL-MCNC: 126 MG/DL — SIGNIFICANT CHANGE UP (ref 34–200)
HCO3 BLDA-SCNC: 20 MMOL/L — LOW (ref 22–26)
HCO3 BLDA-SCNC: 20 MMOL/L — LOW (ref 22–26)
HCO3 BLDA-SCNC: 21 MMOL/L — LOW (ref 22–26)
HCO3 BLDA-SCNC: 22 MMOL/L — SIGNIFICANT CHANGE UP (ref 22–26)
HCOV PNL SPEC NAA+PROBE: SIGNIFICANT CHANGE UP
HCT VFR BLD CALC: 40 % — SIGNIFICANT CHANGE UP (ref 39–50)
HCT VFR BLD CALC: 43.5 % — SIGNIFICANT CHANGE UP (ref 39–50)
HCT VFR BLD CALC: 45.2 % — SIGNIFICANT CHANGE UP (ref 39–50)
HCT VFR BLDA CALC: 41.1 % — SIGNIFICANT CHANGE UP (ref 39–51)
HCT VFR BLDA CALC: 43.2 % — SIGNIFICANT CHANGE UP (ref 39–51)
HCT VFR BLDA CALC: 44.6 % — SIGNIFICANT CHANGE UP (ref 39–51)
HCT VFR BLDA CALC: 45.7 % — SIGNIFICANT CHANGE UP (ref 39–51)
HGB BLD-MCNC: 12.7 G/DL — LOW (ref 13–17)
HGB BLD-MCNC: 14.3 G/DL — SIGNIFICANT CHANGE UP (ref 13–17)
HGB BLD-MCNC: 14.3 G/DL — SIGNIFICANT CHANGE UP (ref 13–17)
HGB BLDA-MCNC: 13.4 G/DL — SIGNIFICANT CHANGE UP (ref 13–17)
HGB BLDA-MCNC: 14.1 G/DL — SIGNIFICANT CHANGE UP (ref 13–17)
HGB BLDA-MCNC: 14.5 G/DL — SIGNIFICANT CHANGE UP (ref 13–17)
HGB BLDA-MCNC: 14.9 G/DL — SIGNIFICANT CHANGE UP (ref 13–17)
HMPV RNA SPEC QL NAA+PROBE: SIGNIFICANT CHANGE UP
HPIV1 RNA SPEC QL NAA+PROBE: SIGNIFICANT CHANGE UP
HPIV2 RNA SPEC QL NAA+PROBE: SIGNIFICANT CHANGE UP
HPIV3 RNA SPEC QL NAA+PROBE: SIGNIFICANT CHANGE UP
HPIV4 RNA SPEC QL NAA+PROBE: SIGNIFICANT CHANGE UP
IMM GRANULOCYTES NFR BLD AUTO: 0.4 % — SIGNIFICANT CHANGE UP (ref 0–1.5)
IMM GRANULOCYTES NFR BLD AUTO: 0.8 % — SIGNIFICANT CHANGE UP (ref 0–1.5)
INR BLD: 1.21 — HIGH (ref 0.88–1.16)
LACTATE BLDA-SCNC: 1.1 MMOL/L — SIGNIFICANT CHANGE UP (ref 0.5–2)
LACTATE BLDA-SCNC: 1.3 MMOL/L — SIGNIFICANT CHANGE UP (ref 0.5–2)
LACTATE BLDA-SCNC: 1.3 MMOL/L — SIGNIFICANT CHANGE UP (ref 0.5–2)
LYMPHOCYTES # BLD AUTO: 1.1 K/UL — SIGNIFICANT CHANGE UP (ref 1–3.3)
LYMPHOCYTES # BLD AUTO: 19 % — SIGNIFICANT CHANGE UP (ref 13–44)
LYMPHOCYTES # BLD AUTO: 4.06 K/UL — HIGH (ref 1–3.3)
LYMPHOCYTES # BLD AUTO: 8.1 % — LOW (ref 13–44)
LYMPHOCYTES NFR SPEC AUTO: 5.7 % — LOW (ref 13–44)
MACROCYTES BLD QL: SLIGHT — SIGNIFICANT CHANGE UP
MAGNESIUM SERPL-MCNC: 1.8 MG/DL — SIGNIFICANT CHANGE UP (ref 1.6–2.6)
MAGNESIUM SERPL-MCNC: 1.9 MG/DL — SIGNIFICANT CHANGE UP (ref 1.6–2.6)
MAGNESIUM SERPL-MCNC: 1.9 MG/DL — SIGNIFICANT CHANGE UP (ref 1.6–2.6)
MCHC RBC-ENTMCNC: 28.8 PG — SIGNIFICANT CHANGE UP (ref 27–34)
MCHC RBC-ENTMCNC: 28.9 PG — SIGNIFICANT CHANGE UP (ref 27–34)
MCHC RBC-ENTMCNC: 29.5 PG — SIGNIFICANT CHANGE UP (ref 27–34)
MCHC RBC-ENTMCNC: 31.6 % — LOW (ref 32–36)
MCHC RBC-ENTMCNC: 31.8 % — LOW (ref 32–36)
MCHC RBC-ENTMCNC: 32.9 % — SIGNIFICANT CHANGE UP (ref 32–36)
MCV RBC AUTO: 89.7 FL — SIGNIFICANT CHANGE UP (ref 80–100)
MCV RBC AUTO: 91.1 FL — SIGNIFICANT CHANGE UP (ref 80–100)
MCV RBC AUTO: 91.1 FL — SIGNIFICANT CHANGE UP (ref 80–100)
MONOCYTES # BLD AUTO: 0.67 K/UL — SIGNIFICANT CHANGE UP (ref 0–0.9)
MONOCYTES # BLD AUTO: 1.87 K/UL — HIGH (ref 0–0.9)
MONOCYTES NFR BLD AUTO: 4.9 % — SIGNIFICANT CHANGE UP (ref 2–14)
MONOCYTES NFR BLD AUTO: 8.7 % — SIGNIFICANT CHANGE UP (ref 2–14)
MONOCYTES NFR BLD: 5.7 % — SIGNIFICANT CHANGE UP (ref 2–9)
NEUTROPHIL AB SER-ACNC: 73.3 % — SIGNIFICANT CHANGE UP (ref 43–77)
NEUTROPHILS # BLD AUTO: 11.79 K/UL — HIGH (ref 1.8–7.4)
NEUTROPHILS # BLD AUTO: 15.26 K/UL — HIGH (ref 1.8–7.4)
NEUTROPHILS NFR BLD AUTO: 71.3 % — SIGNIFICANT CHANGE UP (ref 43–77)
NEUTROPHILS NFR BLD AUTO: 86.4 % — HIGH (ref 43–77)
NEUTS BAND # BLD: 9.5 % — HIGH (ref 0–6)
NIGHT BLUE STAIN TISS: SIGNIFICANT CHANGE UP
NRBC # FLD: 0 K/UL — SIGNIFICANT CHANGE UP (ref 0–0)
NT-PROBNP SERPL-SCNC: 41.51 PG/ML — SIGNIFICANT CHANGE UP
PCO2 BLDA: 34 MMHG — LOW (ref 35–48)
PCO2 BLDA: 40 MMHG — SIGNIFICANT CHANGE UP (ref 35–48)
PCO2 BLDA: 41 MMHG — SIGNIFICANT CHANGE UP (ref 35–48)
PCO2 BLDA: 52 MMHG — HIGH (ref 35–48)
PH BLDA: 7.26 PH — LOW (ref 7.35–7.45)
PH BLDA: 7.34 PH — LOW (ref 7.35–7.45)
PH BLDA: 7.34 PH — LOW (ref 7.35–7.45)
PH BLDA: 7.37 PH — SIGNIFICANT CHANGE UP (ref 7.35–7.45)
PHOSPHATE SERPL-MCNC: 2.4 MG/DL — LOW (ref 2.5–4.5)
PHOSPHATE SERPL-MCNC: 2.4 MG/DL — LOW (ref 2.5–4.5)
PHOSPHATE SERPL-MCNC: 2.8 MG/DL — SIGNIFICANT CHANGE UP (ref 2.5–4.5)
PLATELET # BLD AUTO: 211 K/UL — SIGNIFICANT CHANGE UP (ref 150–400)
PLATELET # BLD AUTO: 271 K/UL — SIGNIFICANT CHANGE UP (ref 150–400)
PLATELET # BLD AUTO: 306 K/UL — SIGNIFICANT CHANGE UP (ref 150–400)
PLATELET COUNT - ESTIMATE: NORMAL — SIGNIFICANT CHANGE UP
PMV BLD: 9.3 FL — SIGNIFICANT CHANGE UP (ref 7–13)
PMV BLD: 9.5 FL — SIGNIFICANT CHANGE UP (ref 7–13)
PMV BLD: 9.7 FL — SIGNIFICANT CHANGE UP (ref 7–13)
PO2 BLDA: 113 MMHG — HIGH (ref 83–108)
PO2 BLDA: 119 MMHG — HIGH (ref 83–108)
PO2 BLDA: 67 MMHG — LOW (ref 83–108)
PO2 BLDA: 92 MMHG — SIGNIFICANT CHANGE UP (ref 83–108)
POIKILOCYTOSIS BLD QL AUTO: SLIGHT — SIGNIFICANT CHANGE UP
POTASSIUM BLDA-SCNC: 3.5 MMOL/L — SIGNIFICANT CHANGE UP (ref 3.4–4.5)
POTASSIUM BLDA-SCNC: 3.9 MMOL/L — SIGNIFICANT CHANGE UP (ref 3.4–4.5)
POTASSIUM BLDA-SCNC: 4.3 MMOL/L — SIGNIFICANT CHANGE UP (ref 3.4–4.5)
POTASSIUM BLDA-SCNC: 4.7 MMOL/L — HIGH (ref 3.4–4.5)
POTASSIUM SERPL-MCNC: 3.7 MMOL/L — SIGNIFICANT CHANGE UP (ref 3.5–5.3)
POTASSIUM SERPL-MCNC: 4 MMOL/L — SIGNIFICANT CHANGE UP (ref 3.5–5.3)
POTASSIUM SERPL-MCNC: 4.1 MMOL/L — SIGNIFICANT CHANGE UP (ref 3.5–5.3)
POTASSIUM SERPL-MCNC: 4.9 MMOL/L — SIGNIFICANT CHANGE UP (ref 3.5–5.3)
POTASSIUM SERPL-SCNC: 3.7 MMOL/L — SIGNIFICANT CHANGE UP (ref 3.5–5.3)
POTASSIUM SERPL-SCNC: 4 MMOL/L — SIGNIFICANT CHANGE UP (ref 3.5–5.3)
POTASSIUM SERPL-SCNC: 4.1 MMOL/L — SIGNIFICANT CHANGE UP (ref 3.5–5.3)
POTASSIUM SERPL-SCNC: 4.9 MMOL/L — SIGNIFICANT CHANGE UP (ref 3.5–5.3)
PROT SERPL-MCNC: 6.2 G/DL — SIGNIFICANT CHANGE UP (ref 6–8.3)
PROT SERPL-MCNC: 8.1 G/DL — SIGNIFICANT CHANGE UP (ref 6–8.3)
PROTHROM AB SERPL-ACNC: 13.7 SEC — HIGH (ref 10.6–13.6)
RAPID RVP RESULT: SIGNIFICANT CHANGE UP
RBC # BLD: 4.39 M/UL — SIGNIFICANT CHANGE UP (ref 4.2–5.8)
RBC # BLD: 4.85 M/UL — SIGNIFICANT CHANGE UP (ref 4.2–5.8)
RBC # BLD: 4.96 M/UL — SIGNIFICANT CHANGE UP (ref 4.2–5.8)
RBC # FLD: 12.6 % — SIGNIFICANT CHANGE UP (ref 10.3–14.5)
RBC # FLD: 12.9 % — SIGNIFICANT CHANGE UP (ref 10.3–14.5)
RBC # FLD: 13.4 % — SIGNIFICANT CHANGE UP (ref 10.3–14.5)
REVIEW TO FOLLOW: YES — SIGNIFICANT CHANGE UP
RH IG SCN BLD-IMP: POSITIVE — SIGNIFICANT CHANGE UP
RSV RNA SPEC QL NAA+PROBE: SIGNIFICANT CHANGE UP
RV+EV RNA SPEC QL NAA+PROBE: SIGNIFICANT CHANGE UP
SAO2 % BLDA: 90.9 % — LOW (ref 95–99)
SAO2 % BLDA: 96.4 % — SIGNIFICANT CHANGE UP (ref 95–99)
SAO2 % BLDA: 97.8 % — SIGNIFICANT CHANGE UP (ref 95–99)
SAO2 % BLDA: 98.3 % — SIGNIFICANT CHANGE UP (ref 95–99)
SARS-COV-2 RNA SPEC QL NAA+PROBE: SIGNIFICANT CHANGE UP
SMUDGE CELLS # BLD: PRESENT — SIGNIFICANT CHANGE UP
SODIUM BLDA-SCNC: 135 MMOL/L — LOW (ref 136–146)
SODIUM BLDA-SCNC: 136 MMOL/L — SIGNIFICANT CHANGE UP (ref 136–146)
SODIUM SERPL-SCNC: 134 MMOL/L — LOW (ref 135–145)
SODIUM SERPL-SCNC: 135 MMOL/L — SIGNIFICANT CHANGE UP (ref 135–145)
SODIUM SERPL-SCNC: 135 MMOL/L — SIGNIFICANT CHANGE UP (ref 135–145)
SODIUM SERPL-SCNC: 136 MMOL/L — SIGNIFICANT CHANGE UP (ref 135–145)
SPECIMEN SOURCE: SIGNIFICANT CHANGE UP
VARIANT LYMPHS # BLD: 4.8 % — SIGNIFICANT CHANGE UP
WBC # BLD: 13.64 K/UL — HIGH (ref 3.8–10.5)
WBC # BLD: 21.41 K/UL — HIGH (ref 3.8–10.5)
WBC # BLD: 24.75 K/UL — HIGH (ref 3.8–10.5)
WBC # FLD AUTO: 13.64 K/UL — HIGH (ref 3.8–10.5)
WBC # FLD AUTO: 21.41 K/UL — HIGH (ref 3.8–10.5)
WBC # FLD AUTO: 24.75 K/UL — HIGH (ref 3.8–10.5)

## 2020-11-25 PROCEDURE — 99292 CRITICAL CARE ADDL 30 MIN: CPT | Mod: 25

## 2020-11-25 PROCEDURE — 71045 X-RAY EXAM CHEST 1 VIEW: CPT | Mod: 26,76

## 2020-11-25 PROCEDURE — 31624 DX BRONCHOSCOPE/LAVAGE: CPT

## 2020-11-25 PROCEDURE — 31645 BRNCHSC W/THER ASPIR 1ST: CPT

## 2020-11-25 PROCEDURE — 36600 WITHDRAWAL OF ARTERIAL BLOOD: CPT | Mod: XU

## 2020-11-25 PROCEDURE — 99292 CRITICAL CARE ADDL 30 MIN: CPT

## 2020-11-25 PROCEDURE — 88300 SURGICAL PATH GROSS: CPT | Mod: 26,59

## 2020-11-25 PROCEDURE — 31500 INSERT EMERGENCY AIRWAY: CPT

## 2020-11-25 PROCEDURE — 88305 TISSUE EXAM BY PATHOLOGIST: CPT | Mod: 26

## 2020-11-25 PROCEDURE — 36571 INSERT PICVAD CATH: CPT | Mod: 58

## 2020-11-25 PROCEDURE — 31645 BRNCHSC W/THER ASPIR 1ST: CPT | Mod: 59

## 2020-11-25 PROCEDURE — 88309 TISSUE EXAM BY PATHOLOGIST: CPT | Mod: 26

## 2020-11-25 PROCEDURE — 36620 INSERTION CATHETER ARTERY: CPT

## 2020-11-25 PROCEDURE — 86078 PHYS BLOOD BANK SERV REACTJ: CPT

## 2020-11-25 PROCEDURE — 31624 DX BRONCHOSCOPE/LAVAGE: CPT | Mod: 59

## 2020-11-25 PROCEDURE — 71045 X-RAY EXAM CHEST 1 VIEW: CPT | Mod: 26,77

## 2020-11-25 PROCEDURE — 38746 REMOVE THORACIC LYMPH NODES: CPT | Mod: 80

## 2020-11-25 PROCEDURE — 99291 CRITICAL CARE FIRST HOUR: CPT

## 2020-11-25 PROCEDURE — 99222 1ST HOSP IP/OBS MODERATE 55: CPT | Mod: 25

## 2020-11-25 PROCEDURE — 32124 EXPLORE CHEST FREE ADHESIONS: CPT | Mod: 59

## 2020-11-25 PROCEDURE — 71275 CT ANGIOGRAPHY CHEST: CPT | Mod: 26

## 2020-11-25 PROCEDURE — 32480 PARTIAL REMOVAL OF LUNG: CPT

## 2020-11-25 PROCEDURE — 32124 EXPLORE CHEST FREE ADHESIONS: CPT | Mod: 80,59

## 2020-11-25 PROCEDURE — 99291 CRITICAL CARE FIRST HOUR: CPT | Mod: 25

## 2020-11-25 PROCEDURE — 38746 REMOVE THORACIC LYMPH NODES: CPT

## 2020-11-25 PROCEDURE — 32480 PARTIAL REMOVAL OF LUNG: CPT | Mod: 80

## 2020-11-25 RX ORDER — DIPHENHYDRAMINE HCL 50 MG
50 CAPSULE ORAL ONCE
Refills: 0 | Status: COMPLETED | OUTPATIENT
Start: 2020-11-25 | End: 2020-11-25

## 2020-11-25 RX ORDER — SUCCINYLCHOLINE CHLORIDE 100 MG/5ML
100 SYRINGE (ML) INTRAVENOUS ONCE
Refills: 0 | Status: COMPLETED | OUTPATIENT
Start: 2020-11-25 | End: 2020-11-25

## 2020-11-25 RX ORDER — VANCOMYCIN HCL 1 G
1000 VIAL (EA) INTRAVENOUS ONCE
Refills: 0 | Status: COMPLETED | OUTPATIENT
Start: 2020-11-25 | End: 2020-11-25

## 2020-11-25 RX ORDER — ETOMIDATE 2 MG/ML
20 INJECTION INTRAVENOUS ONCE
Refills: 0 | Status: COMPLETED | OUTPATIENT
Start: 2020-11-25 | End: 2020-11-25

## 2020-11-25 RX ORDER — SODIUM CHLORIDE 9 MG/ML
500 INJECTION, SOLUTION INTRAVENOUS ONCE
Refills: 0 | Status: DISCONTINUED | OUTPATIENT
Start: 2020-11-25 | End: 2020-11-25

## 2020-11-25 RX ORDER — PANTOPRAZOLE SODIUM 20 MG/1
40 TABLET, DELAYED RELEASE ORAL EVERY 24 HOURS
Refills: 0 | Status: DISCONTINUED | OUTPATIENT
Start: 2020-11-25 | End: 2020-11-29

## 2020-11-25 RX ORDER — SODIUM CHLORIDE 9 MG/ML
1000 INJECTION, SOLUTION INTRAVENOUS
Refills: 0 | Status: DISCONTINUED | OUTPATIENT
Start: 2020-11-25 | End: 2020-11-26

## 2020-11-25 RX ORDER — PROPOFOL 10 MG/ML
50 INJECTION, EMULSION INTRAVENOUS
Qty: 1000 | Refills: 0 | Status: DISCONTINUED | OUTPATIENT
Start: 2020-11-25 | End: 2020-11-26

## 2020-11-25 RX ORDER — DEXMEDETOMIDINE HYDROCHLORIDE IN 0.9% SODIUM CHLORIDE 4 UG/ML
0.6 INJECTION INTRAVENOUS
Qty: 400 | Refills: 0 | Status: DISCONTINUED | OUTPATIENT
Start: 2020-11-25 | End: 2020-11-26

## 2020-11-25 RX ORDER — ACETAMINOPHEN 500 MG
1000 TABLET ORAL ONCE
Refills: 0 | Status: COMPLETED | OUTPATIENT
Start: 2020-11-25 | End: 2021-10-24

## 2020-11-25 RX ORDER — FENTANYL CITRATE 50 UG/ML
100 INJECTION INTRAVENOUS ONCE
Refills: 0 | Status: DISCONTINUED | OUTPATIENT
Start: 2020-11-25 | End: 2020-11-25

## 2020-11-25 RX ORDER — ACETAMINOPHEN 500 MG
1000 TABLET ORAL ONCE
Refills: 0 | Status: COMPLETED | OUTPATIENT
Start: 2020-11-25 | End: 2020-11-25

## 2020-11-25 RX ORDER — CHLORHEXIDINE GLUCONATE 213 G/1000ML
1 SOLUTION TOPICAL AT BEDTIME
Refills: 0 | Status: DISCONTINUED | OUTPATIENT
Start: 2020-11-25 | End: 2020-11-29

## 2020-11-25 RX ORDER — FENTANYL CITRATE 50 UG/ML
2 INJECTION INTRAVENOUS
Qty: 2500 | Refills: 0 | Status: DISCONTINUED | OUTPATIENT
Start: 2020-11-25 | End: 2020-11-26

## 2020-11-25 RX ORDER — ALBUMIN HUMAN 25 %
250 VIAL (ML) INTRAVENOUS ONCE
Refills: 0 | Status: COMPLETED | OUTPATIENT
Start: 2020-11-25 | End: 2020-11-25

## 2020-11-25 RX ORDER — PIPERACILLIN AND TAZOBACTAM 4; .5 G/20ML; G/20ML
3.38 INJECTION, POWDER, LYOPHILIZED, FOR SOLUTION INTRAVENOUS EVERY 8 HOURS
Refills: 0 | Status: DISCONTINUED | OUTPATIENT
Start: 2020-11-25 | End: 2020-12-02

## 2020-11-25 RX ORDER — SODIUM CHLORIDE 9 MG/ML
1000 INJECTION, SOLUTION INTRAVENOUS
Refills: 0 | Status: DISCONTINUED | OUTPATIENT
Start: 2020-11-25 | End: 2020-11-25

## 2020-11-25 RX ORDER — CHLORHEXIDINE GLUCONATE 213 G/1000ML
15 SOLUTION TOPICAL EVERY 12 HOURS
Refills: 0 | Status: DISCONTINUED | OUTPATIENT
Start: 2020-11-25 | End: 2020-11-26

## 2020-11-25 RX ORDER — ALBUMIN HUMAN 25 %
250 VIAL (ML) INTRAVENOUS ONCE
Refills: 0 | Status: COMPLETED | OUTPATIENT
Start: 2020-11-25 | End: 2020-11-26

## 2020-11-25 RX ORDER — VASOPRESSIN 20 [USP'U]/ML
0.02 INJECTION INTRAVENOUS
Qty: 50 | Refills: 0 | Status: DISCONTINUED | OUTPATIENT
Start: 2020-11-25 | End: 2020-11-26

## 2020-11-25 RX ORDER — HEPARIN SODIUM 5000 [USP'U]/ML
5000 INJECTION INTRAVENOUS; SUBCUTANEOUS EVERY 8 HOURS
Refills: 0 | Status: DISCONTINUED | OUTPATIENT
Start: 2020-11-25 | End: 2020-12-02

## 2020-11-25 RX ORDER — VECURONIUM BROMIDE 20 MG/1
10 INJECTION, POWDER, FOR SOLUTION INTRAVENOUS ONCE
Refills: 0 | Status: COMPLETED | OUTPATIENT
Start: 2020-11-25 | End: 2020-11-25

## 2020-11-25 RX ORDER — SODIUM CHLORIDE 9 MG/ML
1000 INJECTION INTRAMUSCULAR; INTRAVENOUS; SUBCUTANEOUS ONCE
Refills: 0 | Status: COMPLETED | OUTPATIENT
Start: 2020-11-25 | End: 2020-11-25

## 2020-11-25 RX ORDER — CISATRACURIUM BESYLATE 2 MG/ML
3 INJECTION INTRAVENOUS
Qty: 200 | Refills: 0 | Status: DISCONTINUED | OUTPATIENT
Start: 2020-11-25 | End: 2020-11-25

## 2020-11-25 RX ORDER — PIPERACILLIN AND TAZOBACTAM 4; .5 G/20ML; G/20ML
3.38 INJECTION, POWDER, LYOPHILIZED, FOR SOLUTION INTRAVENOUS ONCE
Refills: 0 | Status: COMPLETED | OUTPATIENT
Start: 2020-11-25 | End: 2020-11-25

## 2020-11-25 RX ORDER — NOREPINEPHRINE BITARTRATE/D5W 8 MG/250ML
0.05 PLASTIC BAG, INJECTION (ML) INTRAVENOUS
Qty: 8 | Refills: 0 | Status: DISCONTINUED | OUTPATIENT
Start: 2020-11-25 | End: 2020-11-26

## 2020-11-25 RX ORDER — VANCOMYCIN HCL 1 G
1000 VIAL (EA) INTRAVENOUS EVERY 12 HOURS
Refills: 0 | Status: DISCONTINUED | OUTPATIENT
Start: 2020-11-25 | End: 2020-11-26

## 2020-11-25 RX ADMIN — PROPOFOL 25.5 MICROGRAM(S)/KG/MIN: 10 INJECTION, EMULSION INTRAVENOUS at 19:34

## 2020-11-25 RX ADMIN — Medication 250 MILLIGRAM(S): at 18:29

## 2020-11-25 RX ADMIN — Medication 400 MILLIGRAM(S): at 04:00

## 2020-11-25 RX ADMIN — FENTANYL CITRATE 100 MICROGRAM(S): 50 INJECTION INTRAVENOUS at 02:38

## 2020-11-25 RX ADMIN — PIPERACILLIN AND TAZOBACTAM 25 GRAM(S): 4; .5 INJECTION, POWDER, LYOPHILIZED, FOR SOLUTION INTRAVENOUS at 10:00

## 2020-11-25 RX ADMIN — Medication 7.97 MICROGRAM(S)/KG/MIN: at 06:34

## 2020-11-25 RX ADMIN — Medication 250 MILLIGRAM(S): at 06:33

## 2020-11-25 RX ADMIN — Medication 7.97 MICROGRAM(S)/KG/MIN: at 19:34

## 2020-11-25 RX ADMIN — SODIUM CHLORIDE 1000 MILLILITER(S): 9 INJECTION INTRAMUSCULAR; INTRAVENOUS; SUBCUTANEOUS at 02:38

## 2020-11-25 RX ADMIN — Medication 125 MILLILITER(S): at 17:21

## 2020-11-25 RX ADMIN — FENTANYL CITRATE 100 MICROGRAM(S): 50 INJECTION INTRAVENOUS at 04:00

## 2020-11-25 RX ADMIN — Medication 100 MILLIGRAM(S): at 02:04

## 2020-11-25 RX ADMIN — VASOPRESSIN 1.2 UNIT(S)/MIN: 20 INJECTION INTRAVENOUS at 10:00

## 2020-11-25 RX ADMIN — CHLORHEXIDINE GLUCONATE 15 MILLILITER(S): 213 SOLUTION TOPICAL at 11:51

## 2020-11-25 RX ADMIN — CHLORHEXIDINE GLUCONATE 1 APPLICATION(S): 213 SOLUTION TOPICAL at 22:22

## 2020-11-25 RX ADMIN — Medication 1000 MILLIGRAM(S): at 16:30

## 2020-11-25 RX ADMIN — ETOMIDATE 20 MILLIGRAM(S): 2 INJECTION INTRAVENOUS at 02:04

## 2020-11-25 RX ADMIN — PIPERACILLIN AND TAZOBACTAM 25 GRAM(S): 4; .5 INJECTION, POWDER, LYOPHILIZED, FOR SOLUTION INTRAVENOUS at 18:29

## 2020-11-25 RX ADMIN — PROPOFOL 25.5 MICROGRAM(S)/KG/MIN: 10 INJECTION, EMULSION INTRAVENOUS at 06:34

## 2020-11-25 RX ADMIN — DEXMEDETOMIDINE HYDROCHLORIDE IN 0.9% SODIUM CHLORIDE 12.8 MICROGRAM(S)/KG/HR: 4 INJECTION INTRAVENOUS at 06:34

## 2020-11-25 RX ADMIN — Medication 125 MILLILITER(S): at 16:28

## 2020-11-25 RX ADMIN — Medication 50 MILLIGRAM(S): at 04:20

## 2020-11-25 RX ADMIN — Medication 7.97 MICROGRAM(S)/KG/MIN: at 12:00

## 2020-11-25 RX ADMIN — CHLORHEXIDINE GLUCONATE 15 MILLILITER(S): 213 SOLUTION TOPICAL at 19:37

## 2020-11-25 RX ADMIN — PANTOPRAZOLE SODIUM 40 MILLIGRAM(S): 20 TABLET, DELAYED RELEASE ORAL at 06:35

## 2020-11-25 RX ADMIN — PROPOFOL 25.5 MICROGRAM(S)/KG/MIN: 10 INJECTION, EMULSION INTRAVENOUS at 02:42

## 2020-11-25 RX ADMIN — HEPARIN SODIUM 5000 UNIT(S): 5000 INJECTION INTRAVENOUS; SUBCUTANEOUS at 22:22

## 2020-11-25 RX ADMIN — SODIUM CHLORIDE 30 MILLILITER(S): 9 INJECTION, SOLUTION INTRAVENOUS at 16:00

## 2020-11-25 RX ADMIN — SODIUM CHLORIDE 30 MILLILITER(S): 9 INJECTION, SOLUTION INTRAVENOUS at 19:33

## 2020-11-25 RX ADMIN — FENTANYL CITRATE 17 MICROGRAM(S)/KG/HR: 50 INJECTION INTRAVENOUS at 02:42

## 2020-11-25 RX ADMIN — Medication 400 MILLIGRAM(S): at 15:30

## 2020-11-25 RX ADMIN — VASOPRESSIN 1.2 UNIT(S)/MIN: 20 INJECTION INTRAVENOUS at 19:34

## 2020-11-25 RX ADMIN — FENTANYL CITRATE 17 MICROGRAM(S)/KG/HR: 50 INJECTION INTRAVENOUS at 08:00

## 2020-11-25 RX ADMIN — FENTANYL CITRATE 17 MICROGRAM(S)/KG/HR: 50 INJECTION INTRAVENOUS at 06:34

## 2020-11-25 RX ADMIN — Medication 125 MILLILITER(S): at 22:23

## 2020-11-25 RX ADMIN — FENTANYL CITRATE 17 MICROGRAM(S)/KG/HR: 50 INJECTION INTRAVENOUS at 19:34

## 2020-11-25 RX ADMIN — PIPERACILLIN AND TAZOBACTAM 200 GRAM(S): 4; .5 INJECTION, POWDER, LYOPHILIZED, FOR SOLUTION INTRAVENOUS at 04:08

## 2020-11-25 RX ADMIN — PROPOFOL 25.5 MICROGRAM(S)/KG/MIN: 10 INJECTION, EMULSION INTRAVENOUS at 09:00

## 2020-11-25 RX ADMIN — SODIUM CHLORIDE 1000 MILLILITER(S): 9 INJECTION INTRAMUSCULAR; INTRAVENOUS; SUBCUTANEOUS at 04:15

## 2020-11-25 NOTE — H&P ADULT - NSHPPHYSICALEXAM_GEN_ALL_CORE
Vital Signs Last 24 Hrs  T(C): 37.1 (25 Nov 2020 02:53), Max: 37.1 (25 Nov 2020 02:53)  T(F): 98.7 (25 Nov 2020 02:53), Max: 98.7 (25 Nov 2020 02:53)  HR: 120 (25 Nov 2020 03:40) (71 - 122)  BP: 101/55 (25 Nov 2020 03:40) (101/55 - 156/76)  BP(mean): 65 (25 Nov 2020 03:40) (65 - 68)  RR: 21 (25 Nov 2020 03:40) (14 - 28)  SpO2: 98% (25 Nov 2020 03:40) (83% - 100%)    General: Intubated and sedated   Neurology: unable to assess at present   ENT/Neck: Neck supple, trachea midline, No JVD, Gross hearing intact, suction canister with bright red blood about 50cc  Respiratory: decreased breath sounds, ET in place   CV: tachycardiac, S1S2, no murmurs, rubs or gallops  Abdominal: Soft, NT, ND +BS,   Extremities: No edema, + peripheral pulses  Skin: No Rashes, Hematoma, Ecchymosis Vital Signs Last 24 Hrs  T(C): 37.1 (25 Nov 2020 02:53), Max: 37.1 (25 Nov 2020 02:53)  T(F): 98.7 (25 Nov 2020 02:53), Max: 98.7 (25 Nov 2020 02:53)  HR: 120 (25 Nov 2020 03:40) (71 - 122)  BP: 101/55 (25 Nov 2020 03:40) (101/55 - 156/76)  BP(mean): 65 (25 Nov 2020 03:40) (65 - 68)  RR: 21 (25 Nov 2020 03:40) (14 - 28)  SpO2: 98% (25 Nov 2020 03:40) (83% - 100%)    General: Intubated and sedated   Neurology: unable to assess at present   ENT/Neck: Neck supple, trachea midline, No JVD, Gross hearing intact, suction canister with bright red blood about 50cc  Respiratory: decreased breath sounds, ET in place   CV: tachycardiac, S1S2, no murmurs, rubs or gallops  Abdominal: Soft, NT, ND +BS,   Extremities: No edema, + peripheral pulses  Skin: rash below right knee

## 2020-11-25 NOTE — ED PROVIDER NOTE - PHYSICAL EXAMINATION
Physical Exam:  Gen: ill appearing  Head: normal appearing  HEENT: normal conjunctiva, coughing up blood, emesis bag filled with >100cc brb   Lung: tachypnea, mild respiratory distress, speaking in partial sentences, cta throughout  CV: tachycardic  Abd: soft, ND, NT  MSK: no visible deformities  Neuro: No focal deficits  Skin: Warm  Psych: anxious  ~Saad Sierra D.O. -Resident

## 2020-11-25 NOTE — ED ADULT NURSE REASSESSMENT NOTE - NS ED NURSE REASSESS COMMENT FT1
Pt. intubated by MD Hilliard and MD Sierra. ETT 26 at the lip. Pt. medicated as per MD order. Respiratory at bedside. Will continue to monitor.

## 2020-11-25 NOTE — H&P ADULT - ASSESSMENT
45-year-old male with history of htn, Hyperlipidemia, Obesity, VIDHI on CPAP, Psoriasis,  former smoker, who had a history of pepper aspiration with chronic recurrent granulation tissue of the right lower lobe causing postobstructive pneumonia presenting to ER with hemoptysis.     PLAN:  Admit to [facility]  Follow up CTA  Possible bronchoscopy   Continue current vent setting  Sedation  Monitor for hemoptysis      45-year-old male with history of htn, Hyperlipidemia, Obesity, VIDHI on CPAP, Psoriasis,  former smoker, who had a history of pepper aspiration with chronic recurrent granulation tissue of the right lower lobe causing postobstructive pneumonia presenting to ER with hemoptysis.     PLAN:  Admit to [facility]  Follow up CTA  Monitor for hemoptysis  Possible bronchoscopy   Continue current vent setting  Sedation

## 2020-11-25 NOTE — PROGRESS NOTE ADULT - SUBJECTIVE AND OBJECTIVE BOX
PROCEDURE: right thoracotomy, RLLobectomy 11/25/2020      ISSUES:   Septic shock  Acute hypoxic respiratory failure  Massive Hemoptysis  Lung abscess  Empyema  Post op pain  Chest tube in place  HTN      INTERVAL EVENTS:   Received patient intubated on mechanical ventilation. Vent settings adjusted based on blood gas  Received patient on levophed. Added vasopressin. Doses were titrated to maintain MAP 65  Arterial line placed  Central line placed  Bronchoscopy performed which showed RLL bleeding.  OR today for thoracotomy RLL lobectomy.  Transferred back to CTICU intubated.      HISTORY:   Unable to obtain history or ROS due to intubation and sedation    PHYSICAL EXAM:   Gen: Comfortable, No acute distress  Eyes: Sclera white, Conjunctiva normal, Eyelids normal, Pupils symmetrical   ENT: Mucous membranes moist, OG tube in place, endotracheal tube in place,    Neck: Trachea midline,  ,  ,  ,  ,    CV: Rate regular, Rhythm regular,  ,  ,    Resp: Breath sounds clear, No accessory muscles use,  ,    Abd: Soft, Non-distended, Non-tender, Bowel sounds normal,  ,  ,    Skin: Warm, No peripheral edema of lower extremities,  ,    : Maurer in place  Neuro: Moving all 4 extremities,    Psych: RASS -3      ASSESSMENT AND PLAN:     NEURO:  Vent sedation - Wean propofol and fentanyl for RASS goal -2 to 0 for ventilator synchrony.                             RESPIRATORY:  Acute respiratory failure - Mechanical ventilation. Monitor ABG. Wean FIO2 for goal O2sat above 92. Vent bundle.  Spontaneous breathing trials daily    Chest tube – Pleurevac regulated suctioning. Monitor chest tube output.                                CARDIOVASCULAR:  Septic shock requiring IV pressors - Wean pressors for MAP goal of 65.  HTN - currently hypotensive. hold home antihypertensives.                                       RENAL:  DEISY – Renally dose medications. Monitor for hyperkalemia and uremia. Avoid nephrotoxic medications. Monitor IOs and electrolytes.         GASTROINTESTINAL:  GI prophylaxis with pantoprazole  Zofran and Reglan IV PRN for nausea  NPO              HEMATOLOGIC:  No signs of active bleeding. Monitor Hgb in CBC in AM  DVT prophylaxis with heparin subQ and SCDs.         INFECTIOUS DISEASE:  All surgical sites appear clean. Will monitor for fever and leukocytosis.        PNA - Vanc and Zosyn. Monitor vanco levels.                       Follow up cultures           ENDOCRINE:  Stable – Monitor glucose fingersticks for goal 120-180.            Pertinent clinical, laboratory, radiographic, hemodynamic, echocardiographic, respiratory data, microbiologic data and chart were reviewed by myself and analyzed frequently throughout the course of the day and night by myself.    Plan discussed at length with the CTICU staff and Attending CT Surgeon.     Patient's status was discussed with family on phone.    Patient required critical care management.  75 minutes were spent evaluating, managing, providing, coordinating, and documenting care for this patient, exclusive of procedures from  7AM to 1159PM.      ________________________________________________      _________________________  VITAL SIGNS:  Vital Signs Last 24 Hrs  T(C): 39.4 (25 Nov 2020 14:15), Max: 39.7 (25 Nov 2020 03:40)  T(F): 103 (25 Nov 2020 14:15), Max: 103.5 (25 Nov 2020 03:40)  HR: 111 (25 Nov 2020 14:49) (71 - 122)  BP: 127/87 (25 Nov 2020 11:00) (74/40 - 156/76)  BP(mean): 85 (25 Nov 2020 11:00) (47 - 85)  RR: 17 (25 Nov 2020 14:30) (14 - 28)  SpO2: 97% (25 Nov 2020 14:49) (83% - 100%)  I/Os:   I&O's Detail    24 Nov 2020 07:01  -  25 Nov 2020 07:00  --------------------------------------------------------  IN:    Dexmedetomidine: 21.2 mL    FentaNYL: 17 mL    Norepinephrine: 29 mL    Propofol: 48.5 mL  Total IN: 115.7 mL    OUT:    Indwelling Catheter - Urethral (mL): 1100 mL  Total OUT: 1100 mL    Total NET: -984.3 mL          Mode: AC/ CMV (Assist Control/ Continuous Mandatory Ventilation)  RR (machine): 18  TV (machine): 450  FiO2: 50  PEEP: 5  MAP: 9  PIP: 21      MEDICATIONS:  MEDICATIONS  (STANDING):  acetaminophen  IVPB .. 1000 milliGRAM(s) IV Intermittent once  acetaminophen  IVPB .. 1000 milliGRAM(s) IV Intermittent once  acetaminophen  IVPB .. 1000 milliGRAM(s) IV Intermittent once  acetaminophen  IVPB .. 1000 milliGRAM(s) IV Intermittent once  chlorhexidine 0.12% Liquid 15 milliLiter(s) Oral Mucosa every 12 hours  chlorhexidine 4% Liquid 1 Application(s) Topical at bedtime  dexMEDEtomidine Infusion 0.6 MICROgram(s)/kG/Hr (12.8 mL/Hr) IV Continuous <Continuous>  fentaNYL   Infusion 2 MICROgram(s)/kG/Hr (17 mL/Hr) IV Continuous <Continuous>  norepinephrine Infusion 0.05 MICROgram(s)/kG/Min (7.97 mL/Hr) IV Continuous <Continuous>  pantoprazole  Injectable 40 milliGRAM(s) IV Push every 24 hours  piperacillin/tazobactam IVPB.. 3.375 Gram(s) IV Intermittent every 8 hours  propofol Infusion 50 MICROgram(s)/kG/Min (25.5 mL/Hr) IV Continuous <Continuous>  vancomycin  IVPB 1000 milliGRAM(s) IV Intermittent every 12 hours  vasopressin Infusion 0.02 Unit(s)/Min (1.2 mL/Hr) IV Continuous <Continuous>    MEDICATIONS  (PRN):      LABS:                        12.7   13.64 )-----------( 211      ( 25 Nov 2020 04:41 )             40.0     11-25    136  |  106  |  14  ----------------------------<  139<H>  4.0   |  21<L>  |  1.10    Ca    8.1<L>      25 Nov 2020 09:50  Phos  2.8     11-25  Mg     1.9     11-25    TPro  6.2  /  Alb  3.3  /  TBili  1.1  /  DBili  0.4<H>  /  AST  13  /  ALT  17  /  AlkPhos  51  11-25    LIVER FUNCTIONS - ( 25 Nov 2020 04:41 )  Alb: 3.3 g/dL / Pro: 6.2 g/dL / ALK PHOS: 51 u/L / ALT: 17 u/L / AST: 13 u/L / GGT: x           PT/INR - ( 25 Nov 2020 02:00 )   PT: 13.7 SEC;   INR: 1.21          PTT - ( 25 Nov 2020 02:00 )  PTT:32.4 SEC  ABG - ( 25 Nov 2020 13:16 )  pH, Arterial: 7.26  pH, Blood: x     /  pCO2: 52    /  pO2: 67    / HCO3: 20    / Base Excess: -3.5  /  SaO2: 90.9                _________________________

## 2020-11-25 NOTE — ED PROVIDER NOTE - CLINICAL SUMMARY MEDICAL DECISION MAKING FREE TEXT BOX
44yo m pmhx lung consolidiation after choking on food in 11/2018, former smoker, w/ hx of HTN, VIDHI pw massive hemoptysis, will need intubation for airway protection, ct surgery, prbc prn, labs, will admit to icu discuss with ct surg

## 2020-11-25 NOTE — ED PROVIDER NOTE - PROGRESS NOTE DETAILS
patient intubated for airway protection with 8 ett, placed on right side to help tamponade hemoptysis. repeat cxr limited but vitals etco2 and limited xray confirm placement proximal to sandi. discussed with pts wife, understands plan to admit to icu, patient intubated and can be reached at the number in hpi. patient receiving nebulized txa, 1uprbc, ct surgery pa at bedside, micu consulted, patient on prop gtt and fent gtt at this time. will cont to monitor

## 2020-11-25 NOTE — H&P ADULT - NSICDXFAMILYHX_GEN_ALL_CORE_FT
FAMILY HISTORY:  Father  Still living? Unknown  FH: HTN (hypertension), Age at diagnosis: Age Unknown

## 2020-11-25 NOTE — ED ADULT TRIAGE NOTE - CHIEF COMPLAINT QUOTE
pt arrives s/p bronchoscopy 1 week ago, pt actively vomiting dark red vomitus with clots, approximately 4 episodes in triage, saturation 83% on RA, CN aware, pt brought directly back to main ED    Mirela Fried (wife) [phone]

## 2020-11-25 NOTE — ED PROVIDER NOTE - NS ED ROS FT

## 2020-11-25 NOTE — H&P ADULT - HISTORY OF PRESENT ILLNESS
45-year-old male with history of htn, Hyperlipidemia, Obesity, VIDHI on CPAP, Psoriasis,  former smoker, who had a history of pepper aspiration with 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 Patient had multiple bronchoscopies and ablations in the past.  Most recently he had a consolidation in the right lower lobe and the patient had a previous electrocautery ablation of right lower lobe endobronchial obstructive lesions. On 11/16/20, he was s/p Flex Bronch, biopsy, electrofulguration of right lower lobe bronchial stenosis.  Patient presented to ER tonight with complaints of hemoptysis which started since yesterday.  As per ER patient had emesis bag with about 150cc of bright red blood.  Patient was hypoxic, 83% on room air and complaining of difficulty breathing.  Patient was intubated in ER. 45-year-old male with history of htn, Hyperlipidemia, Obesity, VIDHI on CPAP, Psoriasis,  former smoker, who had a history of pepper aspiration with 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 Patient had multiple bronchoscopies and ablations in the past.  Most recently he had a consolidation in the right lower lobe and the patient had a previous electrocautery ablation of right lower lobe endobronchial obstructive lesions. On 11/16/20, he was s/p Flex Bronch, biopsy, electrofulguration of right lower lobe bronchial stenosis.  Patient presented to ER tonight with complaints of hemoptysis which started since yesterday.  As per ER patient had emesis bag with about 150cc of bright red blood.  Patient was hypoxic, 83% on room air and complaining of difficulty breathing.  Patient was intubated in ER, admitted to [facility] for further management.

## 2020-11-25 NOTE — H&P ADULT - ASSESSMENT
45yearold male with history of htn, Hyperlipidemia, Obesity, VIDHI on CPAP, Psoriasis,  former smoker, who had a history of pepper aspiration with chronic recurrent granulation tissue of the right lower lobe causing postobstructive pneumonia presenting to ER with hemoptysis.     PLAN:  Admit to [facility] for further management   Follow up CTA  Monitor for hemoptysis  Possible bronchoscopy   Continue current vent setting  Sedation  Above discussed with Dr. King and Dr. Greenberg

## 2020-11-25 NOTE — BRIEF OPERATIVE NOTE - OPERATION/FINDINGS
Flexible bronchoscopy, right thoracotomy, RLLobectomy, post-operative bronchscopy. 28F R angle diaphragmatic CT, 28F straight posterior CT.

## 2020-11-25 NOTE — ED ADULT NURSE REASSESSMENT NOTE - NS ED NURSE REASSESS COMMENT FT1
received report from [facility] coverage RN Elza pt intubated for hypoxia and airway protection in the setting of hemoptysis. pt has recent bronchoscopy. pt sedated, receiving 1 unit uncross matched blood over 30miniutes. pt sedated. positioned on right side and receiving nebulized TXA. mobile ICU RN at bedside.

## 2020-11-25 NOTE — PROCEDURE NOTE - NSPROCDETAILS_GEN_ALL_CORE
positive blood return obtained via catheter/sutured in place/connected to a pressurized flush line/location identified, draped/prepped, sterile technique used, needle inserted/introduced/ultrasound guidance/Seldinger technique/all materials/supplies accounted for at end of procedure
guidewire recovered/sterile dressing applied/lumen(s) aspirated and flushed/ultrasound guidance/sterile technique, catheter placed

## 2020-11-25 NOTE — H&P ADULT - HISTORY OF PRESENT ILLNESS
45yearold male with history of htn, Hyperlipidemia, Obesity, VIDHI on CPAP, Psoriasis,  former smoker, who had a history of pepper aspiration with 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 Patient had multiple bronchoscopies and ablations in the past.  Most recently he had a consolidation in the right lower lobe and the patient had a previous electrocautery ablation of right lower lobe endobronchial obstructive lesions. On 11/16/20, he was s/p Flex Bronch, biopsy, electrofulguration of right lower lobe bronchial stenosis.  Patient presented to ER tonight with complaints of hemoptysis which started since yesterday.  As per ER patient had emesis bag with about 150cc of bright red blood.  Patient was hypoxic, 83% on room air and complaining of difficulty breathing.  Patient was intubated in ER, admitted to [facility] for further management.

## 2020-11-25 NOTE — ED PROVIDER NOTE - CRITICAL CARE PROVIDED
consultation with other physicians/direct patient care (not related to procedure)/interpretation of diagnostic studies/consult w/ pt's family directly relating to pts condition/documentation/additional history taking

## 2020-11-25 NOTE — PROCEDURE NOTE - NSICDXPROCEDURE_GEN_ALL_CORE_FT
PROCEDURES:  Insertion, catheter, central venous, non-tunneled, age 5 years or older 25-Nov-2020 10:31:59  Dwight Lozano

## 2020-11-25 NOTE — PRE-OP CHECKLIST - TEMPERATURE IN CELSIUS (DEGREES C)
Patient refused to be turned on her side with a pillow under her hip. Patient and family were educated that a pillow under her hip would prevent patient from getting pressure ulcer on her bottom. Patient still declined.    37.9

## 2020-11-25 NOTE — H&P ADULT - NSHPPHYSICALEXAM_GEN_ALL_CORE
Physical Exam: Vital Signs Last 24 Hrs  T(C): 37.1 (25 Nov 2020 02:53), Max: 37.1 (25 Nov 2020 02:53)  T(F): 98.7 (25 Nov 2020 02:53), Max: 98.7 (25 Nov 2020 02:53)  HR: 120 (25 Nov 2020 03:40) (71 - 122)  BP: 101/55 (25 Nov 2020 03:40) (101/55 - 156/76)  BP(mean): 65 (25 Nov 2020 03:40) (65 - 68)  RR: 21 (25 Nov 2020 03:40) (14 - 28)  SpO2: 98% (25 Nov 2020 03:40) (83% - 100%)    General: Intubated and sedated   Neurology: unable to assess at present   ENT/Neck: Neck supple, trachea midline, No JVD, Gross hearing intact, suction canister with bright red blood about 50cc  Respiratory: decreased breath sounds, ET in place   CV: tachycardiac, S1S2, no murmurs, rubs or gallops  Abdominal: Soft, NT, ND +BS,   Extremities: No edema, + peripheral pulses  Skin: Psoriatic rash below right knee

## 2020-11-25 NOTE — ED PROVIDER NOTE - ATTENDING CONTRIBUTION TO CARE
46 yo with history of a lung consolidation after choking episode in 2018 presenting with massive hemoptysis that started last night.  Multiple episodes of BRB.  Does report some difficulty breathing.  Is s/p bronch with ablation on 11/17/2020 but has had no hemoptysis since that time.      Gen: Ill appearing in mod resp distress actively coughing up BRB   Head: NC/AT  Neck: trachea midline  CV: Tachy RR  Resp:  Resp distress decreased BS RLL   Abd: soft NT ND  Ext: no deformities  Neuro:  A&O appears non focal  Skin:  Warm and dry as visualized    46 yo with massive hemoptysis.  Pt is hypoxic on presentation and is having resp distress.  Decision to protect airway and anticipated clinical course pt was intubated.  As bleeding was on the R side based on CxR pt was intubated with tube to regular position and immediately placed R side down.  After about 10 minutes blood no longer being suctioned from the ETT.  CT surg called for consultation and definitive management.  Pt sedated with propofol and fentanyl.  With amount of bleeding one unit of pRBC was given unmatched.  Pt then spiked a fever.  Not consistent with malignant hypothermia as patient is not rigid.  Could be blood reaction and sepsis considered less likely.  But broad spectrum Abx given along with more fluids.  CTICU will accept patient once COVID results.  They are requesting CTA on way to unit and that the patient be paralyzed.

## 2020-11-25 NOTE — ED PROVIDER NOTE - OBJECTIVE STATEMENT
44yo m pmhx lung consolidiation after choking on food in 11/2018, former smoker, w/ hx of HTN, VIDHI. Patient was coughing up blood, underwent treatment with antibiotics, s/p bronchoscopies with biopsies and ablation, last in 10/2019. sp Flexible Bronchoscopy, Electrocautery Ablation on 11/17/2020 now pw massive hemoptysis since yesterday. multiple episodes of brb coughing up. Denies recent trauma, fevers, chills, headache, dizziness, nausea, vomiting, dysuria, freq, hematuria, diarrhea, constipation, chest pain, shortness of breath. neg covid 1 test 1 week ago    in ed triage- saturation 83% on RA, pt brought directly back to trauma b     Nichokirit Corky (wife) [phone]

## 2020-11-25 NOTE — ED ADULT NURSE NOTE - CHIEF COMPLAINT QUOTE
pt arrives s/p bronchoscopy 1 week ago, pt actively vomiting dark red vomitus with clots, approximately 4 episodes in triage, saturation 83% on RA, CN aware, pt brought directly back to main ED    Mirela Fried (wife) [phone]

## 2020-11-25 NOTE — ED ADULT NURSE NOTE - OBJECTIVE STATEMENT
BREAK RN: Pt. presents to trauma room B, A&OX4, s/p bronchoscopy one week ago. Pt. actively vomiting dark red blood w/ clots. Pt. respirations labored, pt. hypoxic on assessment saturating at 83% on room air. Pt. denies any recent travel, trauma, dizziness, chest pain, abdominal pain. 18 G IV established to b/l AC. Labs drawn and sent. COVID swab sent. EKG in progress. Pt. on cardiac monitor, Sinus Tach. PHx lung consolidation after choking on food in 2018, HTN, VIDHI. Awaiting further orders. Will continue to monitor.

## 2020-11-25 NOTE — H&P ADULT - NSHPLABSRESULTS_GEN_ALL_CORE
(11-25 @ 02:00)                      14.3  21.41 )-----------( 306                 45.2    Neutrophils = 15.26 (71.3%)  Lymphocytes = 4.06 (19.0%)  Eosinophils = 0.01 (0.0%)  Basophils = 0.04 (0.2%)  Monocytes = 1.87 (8.7%)  Bands = --%    11-25    135  |  99  |  15  ----------------------------<  144<H>  3.7   |  25  |  1.15    Ca    9.6      25 Nov 2020 02:00    TPro  8.1  /  Alb  4.4  /  TBili  1.0  /  DBili  x   /  AST  13  /  ALT  24  /  AlkPhos  70  11-25    ( 25 Nov 2020 02:00 )   PT: 13.7 SEC;   INR: 1.21 ;       PTT:32.4 SEC

## 2020-11-25 NOTE — ED ADULT NURSE NOTE - INTERVENTIONS DEFINITIONS
Call bell, personal items and telephone within reach/Physically safe environment: no spills, clutter or unnecessary equipment/Stretcher in lowest position, wheels locked, appropriate side rails in place

## 2020-11-25 NOTE — ED PROCEDURE NOTE - ATTENDING CONTRIBUTION TO CARE
I was present for the critical portions of the procedure.  I was also immediately available for any and all other parts of the procedure

## 2020-11-26 LAB
ANION GAP SERPL CALC-SCNC: 11 MMO/L — SIGNIFICANT CHANGE UP (ref 7–14)
APTT BLD: 35.8 SEC — SIGNIFICANT CHANGE UP (ref 27–36.3)
BASE EXCESS BLDA CALC-SCNC: -0.7 MMOL/L — SIGNIFICANT CHANGE UP
BASE EXCESS BLDA CALC-SCNC: -1.1 MMOL/L — SIGNIFICANT CHANGE UP
BUN SERPL-MCNC: 7 MG/DL — SIGNIFICANT CHANGE UP (ref 7–23)
CA-I BLDA-SCNC: 1.16 MMOL/L — SIGNIFICANT CHANGE UP (ref 1.15–1.29)
CA-I BLDA-SCNC: 1.2 MMOL/L — SIGNIFICANT CHANGE UP (ref 1.15–1.29)
CALCIUM SERPL-MCNC: 8.4 MG/DL — SIGNIFICANT CHANGE UP (ref 8.4–10.5)
CHLORIDE SERPL-SCNC: 106 MMOL/L — SIGNIFICANT CHANGE UP (ref 98–107)
CO2 SERPL-SCNC: 21 MMOL/L — LOW (ref 22–31)
CREAT SERPL-MCNC: 0.88 MG/DL — SIGNIFICANT CHANGE UP (ref 0.5–1.3)
GLUCOSE BLDA-MCNC: 134 MG/DL — HIGH (ref 70–99)
GLUCOSE BLDA-MCNC: 152 MG/DL — HIGH (ref 70–99)
GLUCOSE SERPL-MCNC: 146 MG/DL — HIGH (ref 70–99)
HCO3 BLDA-SCNC: 23 MMOL/L — SIGNIFICANT CHANGE UP (ref 22–26)
HCO3 BLDA-SCNC: 24 MMOL/L — SIGNIFICANT CHANGE UP (ref 22–26)
HCT VFR BLD CALC: 36.1 % — LOW (ref 39–50)
HCT VFR BLDA CALC: 37 % — LOW (ref 39–51)
HCT VFR BLDA CALC: 38.6 % — LOW (ref 39–51)
HGB BLD-MCNC: 11.6 G/DL — LOW (ref 13–17)
HGB BLDA-MCNC: 12 G/DL — LOW (ref 13–17)
HGB BLDA-MCNC: 12.6 G/DL — LOW (ref 13–17)
INR BLD: 1.54 — HIGH (ref 0.88–1.16)
LACTATE BLDA-SCNC: 0.7 MMOL/L — SIGNIFICANT CHANGE UP (ref 0.5–2)
LACTATE BLDA-SCNC: 1 MMOL/L — SIGNIFICANT CHANGE UP (ref 0.5–2)
MAGNESIUM SERPL-MCNC: 2.1 MG/DL — SIGNIFICANT CHANGE UP (ref 1.6–2.6)
MCHC RBC-ENTMCNC: 28.8 PG — SIGNIFICANT CHANGE UP (ref 27–34)
MCHC RBC-ENTMCNC: 32.1 % — SIGNIFICANT CHANGE UP (ref 32–36)
MCV RBC AUTO: 89.6 FL — SIGNIFICANT CHANGE UP (ref 80–100)
NRBC # FLD: 0 K/UL — SIGNIFICANT CHANGE UP (ref 0–0)
PCO2 BLDA: 40 MMHG — SIGNIFICANT CHANGE UP (ref 35–48)
PCO2 BLDA: 47 MMHG — SIGNIFICANT CHANGE UP (ref 35–48)
PH BLDA: 7.34 PH — LOW (ref 7.35–7.45)
PH BLDA: 7.38 PH — SIGNIFICANT CHANGE UP (ref 7.35–7.45)
PHOSPHATE SERPL-MCNC: 1.4 MG/DL — LOW (ref 2.5–4.5)
PLATELET # BLD AUTO: 214 K/UL — SIGNIFICANT CHANGE UP (ref 150–400)
PMV BLD: 10 FL — SIGNIFICANT CHANGE UP (ref 7–13)
PO2 BLDA: 108 MMHG — SIGNIFICANT CHANGE UP (ref 83–108)
PO2 BLDA: 115 MMHG — HIGH (ref 83–108)
POTASSIUM BLDA-SCNC: 3.5 MMOL/L — SIGNIFICANT CHANGE UP (ref 3.4–4.5)
POTASSIUM BLDA-SCNC: 3.7 MMOL/L — SIGNIFICANT CHANGE UP (ref 3.4–4.5)
POTASSIUM SERPL-MCNC: 3.7 MMOL/L — SIGNIFICANT CHANGE UP (ref 3.5–5.3)
POTASSIUM SERPL-SCNC: 3.7 MMOL/L — SIGNIFICANT CHANGE UP (ref 3.5–5.3)
PROTHROM AB SERPL-ACNC: 17.4 SEC — HIGH (ref 10.6–13.6)
RBC # BLD: 4.03 M/UL — LOW (ref 4.2–5.8)
RBC # FLD: 13.5 % — SIGNIFICANT CHANGE UP (ref 10.3–14.5)
SAO2 % BLDA: 97.7 % — SIGNIFICANT CHANGE UP (ref 95–99)
SAO2 % BLDA: 97.9 % — SIGNIFICANT CHANGE UP (ref 95–99)
SODIUM BLDA-SCNC: 136 MMOL/L — SIGNIFICANT CHANGE UP (ref 136–146)
SODIUM BLDA-SCNC: 139 MMOL/L — SIGNIFICANT CHANGE UP (ref 136–146)
SODIUM SERPL-SCNC: 138 MMOL/L — SIGNIFICANT CHANGE UP (ref 135–145)
VANCOMYCIN TROUGH SERPL-MCNC: 2.1 UG/ML — LOW (ref 10–20)
WBC # BLD: 15.99 K/UL — HIGH (ref 3.8–10.5)
WBC # FLD AUTO: 15.99 K/UL — HIGH (ref 3.8–10.5)

## 2020-11-26 PROCEDURE — 99291 CRITICAL CARE FIRST HOUR: CPT

## 2020-11-26 PROCEDURE — 99292 CRITICAL CARE ADDL 30 MIN: CPT

## 2020-11-26 PROCEDURE — 71045 X-RAY EXAM CHEST 1 VIEW: CPT | Mod: 26

## 2020-11-26 RX ORDER — NALOXONE HYDROCHLORIDE 4 MG/.1ML
0.1 SPRAY NASAL
Refills: 0 | Status: DISCONTINUED | OUTPATIENT
Start: 2020-11-26 | End: 2020-12-02

## 2020-11-26 RX ORDER — VANCOMYCIN HCL 1 G
VIAL (EA) INTRAVENOUS
Refills: 0 | Status: DISCONTINUED | OUTPATIENT
Start: 2020-11-26 | End: 2020-11-27

## 2020-11-26 RX ORDER — SODIUM CHLORIDE 9 MG/ML
1000 INJECTION, SOLUTION INTRAVENOUS
Refills: 0 | Status: DISCONTINUED | OUTPATIENT
Start: 2020-11-26 | End: 2020-11-27

## 2020-11-26 RX ORDER — ACETAMINOPHEN 500 MG
1000 TABLET ORAL ONCE
Refills: 0 | Status: COMPLETED | OUTPATIENT
Start: 2020-11-26 | End: 2020-11-26

## 2020-11-26 RX ORDER — VANCOMYCIN HCL 1 G
1250 VIAL (EA) INTRAVENOUS EVERY 12 HOURS
Refills: 0 | Status: DISCONTINUED | OUTPATIENT
Start: 2020-11-27 | End: 2020-11-27

## 2020-11-26 RX ORDER — PHYTONADIONE (VIT K1) 5 MG
5 TABLET ORAL ONCE
Refills: 0 | Status: COMPLETED | OUTPATIENT
Start: 2020-11-26 | End: 2020-11-26

## 2020-11-26 RX ORDER — VANCOMYCIN HCL 1 G
1250 VIAL (EA) INTRAVENOUS ONCE
Refills: 0 | Status: COMPLETED | OUTPATIENT
Start: 2020-11-26 | End: 2020-11-26

## 2020-11-26 RX ORDER — HYDROMORPHONE HYDROCHLORIDE 2 MG/ML
0.5 INJECTION INTRAMUSCULAR; INTRAVENOUS; SUBCUTANEOUS
Refills: 0 | Status: DISCONTINUED | OUTPATIENT
Start: 2020-11-26 | End: 2020-11-30

## 2020-11-26 RX ORDER — ACETAMINOPHEN 500 MG
1000 TABLET ORAL ONCE
Refills: 0 | Status: DISCONTINUED | OUTPATIENT
Start: 2020-11-26 | End: 2020-12-02

## 2020-11-26 RX ORDER — ONDANSETRON 8 MG/1
4 TABLET, FILM COATED ORAL EVERY 6 HOURS
Refills: 0 | Status: DISCONTINUED | OUTPATIENT
Start: 2020-11-26 | End: 2020-11-26

## 2020-11-26 RX ORDER — POTASSIUM PHOSPHATE, MONOBASIC POTASSIUM PHOSPHATE, DIBASIC 236; 224 MG/ML; MG/ML
15 INJECTION, SOLUTION INTRAVENOUS ONCE
Refills: 0 | Status: COMPLETED | OUTPATIENT
Start: 2020-11-26 | End: 2020-11-26

## 2020-11-26 RX ORDER — ALBUMIN HUMAN 25 %
250 VIAL (ML) INTRAVENOUS ONCE
Refills: 0 | Status: COMPLETED | OUTPATIENT
Start: 2020-11-26 | End: 2020-11-26

## 2020-11-26 RX ORDER — SODIUM CHLORIDE 9 MG/ML
4 INJECTION INTRAMUSCULAR; INTRAVENOUS; SUBCUTANEOUS EVERY 6 HOURS
Refills: 0 | Status: COMPLETED | OUTPATIENT
Start: 2020-11-26 | End: 2020-11-29

## 2020-11-26 RX ORDER — HYDROMORPHONE HYDROCHLORIDE 2 MG/ML
30 INJECTION INTRAMUSCULAR; INTRAVENOUS; SUBCUTANEOUS
Refills: 0 | Status: DISCONTINUED | OUTPATIENT
Start: 2020-11-26 | End: 2020-11-30

## 2020-11-26 RX ORDER — ONDANSETRON 8 MG/1
4 TABLET, FILM COATED ORAL EVERY 6 HOURS
Refills: 0 | Status: DISCONTINUED | OUTPATIENT
Start: 2020-11-26 | End: 2020-12-02

## 2020-11-26 RX ADMIN — Medication 166.67 MILLIGRAM(S): at 21:23

## 2020-11-26 RX ADMIN — PANTOPRAZOLE SODIUM 40 MILLIGRAM(S): 20 TABLET, DELAYED RELEASE ORAL at 05:40

## 2020-11-26 RX ADMIN — Medication 125 MILLILITER(S): at 00:22

## 2020-11-26 RX ADMIN — POTASSIUM PHOSPHATE, MONOBASIC POTASSIUM PHOSPHATE, DIBASIC 62.5 MILLIMOLE(S): 236; 224 INJECTION, SOLUTION INTRAVENOUS at 07:00

## 2020-11-26 RX ADMIN — Medication 400 MILLIGRAM(S): at 18:00

## 2020-11-26 RX ADMIN — PIPERACILLIN AND TAZOBACTAM 25 GRAM(S): 4; .5 INJECTION, POWDER, LYOPHILIZED, FOR SOLUTION INTRAVENOUS at 03:11

## 2020-11-26 RX ADMIN — CHLORHEXIDINE GLUCONATE 15 MILLILITER(S): 213 SOLUTION TOPICAL at 05:40

## 2020-11-26 RX ADMIN — Medication 400 MILLIGRAM(S): at 12:00

## 2020-11-26 RX ADMIN — Medication 101 MILLIGRAM(S): at 06:52

## 2020-11-26 RX ADMIN — Medication 400 MILLIGRAM(S): at 05:39

## 2020-11-26 RX ADMIN — HEPARIN SODIUM 5000 UNIT(S): 5000 INJECTION INTRAVENOUS; SUBCUTANEOUS at 05:39

## 2020-11-26 RX ADMIN — Medication 1000 MILLIGRAM(S): at 12:15

## 2020-11-26 RX ADMIN — HEPARIN SODIUM 5000 UNIT(S): 5000 INJECTION INTRAVENOUS; SUBCUTANEOUS at 14:00

## 2020-11-26 RX ADMIN — Medication 250 MILLIGRAM(S): at 05:40

## 2020-11-26 RX ADMIN — PIPERACILLIN AND TAZOBACTAM 25 GRAM(S): 4; .5 INJECTION, POWDER, LYOPHILIZED, FOR SOLUTION INTRAVENOUS at 18:49

## 2020-11-26 RX ADMIN — SODIUM CHLORIDE 4 MILLILITER(S): 9 INJECTION INTRAMUSCULAR; INTRAVENOUS; SUBCUTANEOUS at 16:07

## 2020-11-26 RX ADMIN — Medication 1000 MILLIGRAM(S): at 06:45

## 2020-11-26 RX ADMIN — PIPERACILLIN AND TAZOBACTAM 25 GRAM(S): 4; .5 INJECTION, POWDER, LYOPHILIZED, FOR SOLUTION INTRAVENOUS at 11:57

## 2020-11-26 RX ADMIN — CHLORHEXIDINE GLUCONATE 1 APPLICATION(S): 213 SOLUTION TOPICAL at 21:23

## 2020-11-26 RX ADMIN — SODIUM CHLORIDE 4 MILLILITER(S): 9 INJECTION INTRAMUSCULAR; INTRAVENOUS; SUBCUTANEOUS at 21:53

## 2020-11-26 RX ADMIN — Medication 125 MILLILITER(S): at 05:30

## 2020-11-26 RX ADMIN — Medication 1000 MILLIGRAM(S): at 18:52

## 2020-11-26 RX ADMIN — HEPARIN SODIUM 5000 UNIT(S): 5000 INJECTION INTRAVENOUS; SUBCUTANEOUS at 21:23

## 2020-11-26 NOTE — CONSULT NOTE ADULT - SUBJECTIVE AND OBJECTIVE BOX
Acute Pain Service asked to consult for pain. Evaluation also done earlier by resident and note read.    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 Patient had multiple bronchoscopies and ablations in the past.  Most recently he had a consolidation in the right lower lobe and the patient had a previous electrocautery ablation of right lower lobe endobronchial obstructive lesions. On 11/16/20, he was s/p Flex Bronch, biopsy, electrofulguration of right lower lobe bronchial stenosis.  Patient presented to ER tonight with complaints of hemoptysis which started since yesterday.  As per ER patient had emesis bag with about 150cc of bright red blood.  Patient was hypoxic, 83% on room air and complaining of difficulty breathing.  Patient was intubated in ER, admitted to [facility] for further management.    (25 Nov 2020 04:39)    Patient seen at bedside this afternoon. Patient is AAOx3, awake and alert, s/p extubation in the [facility]. Patient states that he currently does not have any pain. Patient denies taking pain medications at home, denies smoking, EtOH usage or illicit or recreational drugs.      PAST MEDICAL & SURGICAL HISTORY:  Psoriasis    Hyperlipidemia    Obesity    VIDHI on CPAP    HTN (hypertension)    S/P bronchoscopy  3/2019,8/2019, 10/2019        FAMILY HISTORY:  FH: HTN (hypertension) (Father)        SOCIAL HISTORY:  [ X] Denies Smoking, Alcohol, or Drug Use    Allergies    No Known Allergies    Intolerances        PAIN MEDICATIONS:  acetaminophen  IVPB .. 1000 milliGRAM(s) IV Intermittent once  acetaminophen  IVPB .. 1000 milliGRAM(s) IV Intermittent once    Heme:  heparin   Injectable 5000 Unit(s) SubCutaneous every 8 hours    Antibiotics:  piperacillin/tazobactam IVPB.. 3.375 Gram(s) IV Intermittent every 8 hours  vancomycin  IVPB 1000 milliGRAM(s) IV Intermittent every 12 hours    Cardiovascular:    GI:  pantoprazole  Injectable 40 milliGRAM(s) IV Push every 24 hours    Endocrine:    All Other Medications:  chlorhexidine 4% Liquid 1 Application(s) Topical at bedtime  lactated ringers. 1000 milliLiter(s) IV Continuous <Continuous>      Vital Signs Last 24 Hrs  T(C): 38.3 (26 Nov 2020 12:00), Max: 55 (26 Nov 2020 06:00)  T(F): 101 (26 Nov 2020 12:00), Max: 131 (26 Nov 2020 06:00)  HR: 95 (26 Nov 2020 14:00) (61 - 129)  BP: 118/59 (25 Nov 2020 20:00) (118/59 - 118/59)  BP(mean): 73 (25 Nov 2020 20:00) (73 - 73)  RR: 18 (26 Nov 2020 14:00) (15 - 20)  SpO2: 96% (26 Nov 2020 14:00) (96% - 100%)    PAIN SCORE:        see chart           Physical Exam: A & O x 3 in some discomfort    LABS:                          11.6   15.99 )-----------( 214      ( 26 Nov 2020 04:34 )             36.1     11-26    138  |  106  |  7   ----------------------------<  146<H>  3.7   |  21<L>  |  0.88    Ca    8.4      26 Nov 2020 04:34  Phos  1.4     11-26  Mg     2.1     11-26    TPro  6.2  /  Alb  3.3  /  TBili  1.1  /  DBili  0.4<H>  /  AST  13  /  ALT  17  /  AlkPhos  51  11-25    PT/INR - ( 26 Nov 2020 04:34 )   PT: 17.4 SEC;   INR: 1.54          PTT - ( 26 Nov 2020 04:34 )  PTT:35.8 SEC      Drug Screen:        [ X]  [place]    Impression/Plan:   - pt currently does not have pain, but will anticipate that he will, since he has two CTs in place  - recommend start IV PCA  - continue with non-opioid adjuvants      Rufina Parra, PGY 2  Anesthesiology  Pager: [phone]   Acute Pain Service asked to consult for pain. Evaluation also done earlier by resident and note read.    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 Patient had multiple bronchoscopies and ablations in the past.  Most recently he had a consolidation in the right lower lobe and the patient had a previous electrocautery ablation of right lower lobe endobronchial obstructive lesions. On 11/16/20, he was s/p Flex Bronch, biopsy, electrofulguration of right lower lobe bronchial stenosis.  Patient presented to ER tonight with complaints of hemoptysis which started since yesterday.  As per ER patient had emesis bag with about 150cc of bright red blood.  Patient was hypoxic, 83% on room air and complaining of difficulty breathing.  Patient was intubated in ER, admitted to [facility] for further management.    (25 Nov 2020 04:39)    Patient seen at bedside this afternoon. Patient is AAOx3, awake and alert, s/p extubation in the [facility]. Patient states that he currently does not have any pain. Patient denies taking pain medications at home, denies smoking, EtOH usage or illicit or recreational drugs.      PAST MEDICAL & SURGICAL HISTORY:  Psoriasis    Hyperlipidemia    Obesity    VIDHI on CPAP    HTN (hypertension)    S/P bronchoscopy  3/2019,8/2019, 10/2019        FAMILY HISTORY:  FH: HTN (hypertension) (Father)        SOCIAL HISTORY:  [ X] Denies Smoking, Alcohol, or Drug Use    Allergies    No Known Allergies    Intolerances        PAIN MEDICATIONS:  acetaminophen  IVPB .. 1000 milliGRAM(s) IV Intermittent once  acetaminophen  IVPB .. 1000 milliGRAM(s) IV Intermittent once    Heme:  heparin   Injectable 5000 Unit(s) SubCutaneous every 8 hours    Antibiotics:  piperacillin/tazobactam IVPB.. 3.375 Gram(s) IV Intermittent every 8 hours  vancomycin  IVPB 1000 milliGRAM(s) IV Intermittent every 12 hours    Cardiovascular:    GI:  pantoprazole  Injectable 40 milliGRAM(s) IV Push every 24 hours    Endocrine:    All Other Medications:  chlorhexidine 4% Liquid 1 Application(s) Topical at bedtime  lactated ringers. 1000 milliLiter(s) IV Continuous <Continuous>      Vital Signs Last 24 Hrs  T(C): 38.3 (26 Nov 2020 12:00), Max: 55 (26 Nov 2020 06:00)  T(F): 101 (26 Nov 2020 12:00), Max: 131 (26 Nov 2020 06:00)  HR: 95 (26 Nov 2020 14:00) (61 - 129)  BP: 118/59 (25 Nov 2020 20:00) (118/59 - 118/59)  BP(mean): 73 (25 Nov 2020 20:00) (73 - 73)  RR: 18 (26 Nov 2020 14:00) (15 - 20)  SpO2: 96% (26 Nov 2020 14:00) (96% - 100%)    PAIN SCORE:        see chart           Physical Exam: A & O x 3 in some discomfort    LABS:                          11.6   15.99 )-----------( 214      ( 26 Nov 2020 04:34 )             36.1     11-26    138  |  106  |  7   ----------------------------<  146<H>  3.7   |  21<L>  |  0.88    Ca    8.4      26 Nov 2020 04:34  Phos  1.4     11-26  Mg     2.1     11-26    TPro  6.2  /  Alb  3.3  /  TBili  1.1  /  DBili  0.4<H>  /  AST  13  /  ALT  17  /  AlkPhos  51  11-25    PT/INR - ( 26 Nov 2020 04:34 )   PT: 17.4 SEC;   INR: 1.54          PTT - ( 26 Nov 2020 04:34 )  PTT:35.8 SEC      Drug Screen:  No prescribed pain medications per [place]STOP        [ X]  [place]    Impression/Plan:   - pt currently does not have pain, but will anticipate that he will, since he has two CTs in place  - recommend start IV PCA  - continuous pulse ox when patient is on IV PCA  - can add on Toradol per primary team  - continue with non-opioid adjuvants      Rufina Parra, PGY 2  Anesthesiology  Pager: [phone]

## 2020-11-26 NOTE — PROGRESS NOTE ADULT - SUBJECTIVE AND OBJECTIVE BOX
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 Patient had multiple bronchoscopies and ablations in the past.  Most recently he had a consolidation in the right lower lobe and the patient had a previous electrocautery ablation of right lower lobe endobronchial obstructive lesions. On 11/16/20, he was s/p Flex Bronch, biopsy, electrofulguration of right lower lobe bronchial stenosis.  Patient presented to ER tonight with complaints of hemoptysis which started since yesterday.  As per ER patient had emesis bag with about 150cc of bright red blood.  Patient was hypoxic, 83% on room air and complaining of difficulty breathing.  Patient was intubated in ER, admitted to [facility] for further management.       Procedure:   Flexible bronchoscopy, right thoracotomy, RLLobectomy, post-operative bronchscopy. 28F R angle diaphragmatic CT, 28F straight posterior CT.    Issues:  Septic shock  Acute hypoxemic respiratory failure  Postop pain  Hemoptysis  HLD  DEISY    Drips:  Levophed  Precedex               Home Medications:  Edarbi 40 mg oral tablet: 1 tab(s) orally once a day (16 Nov 2020 12:04)      PAST MEDICAL & SURGICAL HISTORY:  Psoriasis    Hyperlipidemia    Obesity    VIDHI on CPAP    HTN (hypertension)    S/P bronchoscopy  3/2019,8/2019, 10/2019          Vital Signs Last 24 Hrs  T(C): 37.2 (26 Nov 2020 09:00), Max: 55 (26 Nov 2020 06:00)  T(F): 99 (26 Nov 2020 09:00), Max: 131 (26 Nov 2020 06:00)  HR: 64 (26 Nov 2020 09:00) (61 - 111)  BP: 118/59 (25 Nov 2020 20:00) (106/62 - 127/87)  BP(mean): 73 (25 Nov 2020 20:00) (73 - 85)  RR: 18 (26 Nov 2020 09:00) (15 - 20)  SpO2: 100% (26 Nov 2020 09:00) (96% - 100%)  I&O's Detail    25 Nov 2020 07:01  -  26 Nov 2020 07:00  --------------------------------------------------------  IN:    dextrose 5% + sodium chloride 0.45%: 390 mL    FentaNYL: 336 mL    IV PiggyBack: 1550 mL    Norepinephrine: 558 mL    Propofol: 566 mL    Vasopressin: 12 mL  Total IN: 3412 mL    OUT:    Chest Tube (mL): 250 mL    Chest Tube (mL): 75 mL    Indwelling Catheter - Urethral (mL): 3240 mL  Total OUT: 3565 mL    Total NET: -153 mL      26 Nov 2020 07:01  -  26 Nov 2020 10:12  --------------------------------------------------------  IN:    dextrose 5% + sodium chloride 0.45%: 60 mL    FentaNYL: 34 mL    Norepinephrine: 67 mL    Propofol: 24 mL  Total IN: 185 mL    OUT:    Chest Tube (mL): 0 mL    Chest Tube (mL): 0 mL    Indwelling Catheter - Urethral (mL): 1150 mL  Total OUT: 1150 mL    Total NET: -965 mL    CAPILLARY BLOOD GLUCOSE      Home Medications:  Edarbi 40 mg oral tablet: 1 tab(s) orally once a day (16 Nov 2020 12:04)      MEDICATIONS  (STANDING):  acetaminophen  IVPB .. 1000 milliGRAM(s) IV Intermittent once  acetaminophen  IVPB .. 1000 milliGRAM(s) IV Intermittent once  chlorhexidine 0.12% Liquid 15 milliLiter(s) Oral Mucosa every 12 hours  chlorhexidine 4% Liquid 1 Application(s) Topical at bedtime  dexMEDEtomidine Infusion 0.6 MICROgram(s)/kG/Hr (12.8 mL/Hr) IV Continuous <Continuous>  dextrose 5% + sodium chloride 0.45%. 1000 milliLiter(s) (30 mL/Hr) IV Continuous <Continuous>  fentaNYL   Infusion 2 MICROgram(s)/kG/Hr (17 mL/Hr) IV Continuous <Continuous>  heparin   Injectable 5000 Unit(s) SubCutaneous every 8 hours  norepinephrine Infusion 0.05 MICROgram(s)/kG/Min (7.97 mL/Hr) IV Continuous <Continuous>  pantoprazole  Injectable 40 milliGRAM(s) IV Push every 24 hours  piperacillin/tazobactam IVPB.. 3.375 Gram(s) IV Intermittent every 8 hours  potassium phosphate IVPB 15 milliMole(s) IV Intermittent once  propofol Infusion 50 MICROgram(s)/kG/Min (25.5 mL/Hr) IV Continuous <Continuous>  vancomycin  IVPB 1000 milliGRAM(s) IV Intermittent every 12 hours  vasopressin Infusion 0.02 Unit(s)/Min (1.2 mL/Hr) IV Continuous <Continuous>    MEDICATIONS  (PRN):      Mode: AC/ CMV (Assist Control/ Continuous Mandatory Ventilation)  RR (machine): 18  TV (machine): 450  FiO2: 40  PEEP: 5  ITime: 0.92  MAP: 9  PIP: 21      Physical exam:                             General:               Pt is sedated                                                 Neuro:                  Could not assess                             Cardiovascular:   S1 & S2, regular                          Respiratory:         Air entry is fair and equal on both sides, has bilateral conducted sounds                           GI:                          Soft, nondistended and nontender, Bowel sounds active                            Ext:                        No cyanosis or edema     Labs:                                                                           11.6   15.99 )-----------( 214      ( 26 Nov 2020 04:34 )             36.1             11-26    138  |  106  |  7   ----------------------------<  146<H>  3.7   |  21<L>  |  0.88    Ca    8.4      26 Nov 2020 04:34  Phos  1.4     11-26  Mg     2.1     11-26    TPro  6.2  /  Alb  3.3  /  TBili  1.1  /  DBili  0.4<H>  /  AST  13  /  ALT  17  /  AlkPhos  51  11-25                  PT/INR - ( 26 Nov 2020 04:34 )   PT: 17.4 SEC;   INR: 1.54          PTT - ( 26 Nov 2020 04:34 )  PTT:35.8 SEC  LIVER FUNCTIONS - ( 25 Nov 2020 04:41 )  Alb: 3.3 g/dL / Pro: 6.2 g/dL / ALK PHOS: 51 u/L / ALT: 17 u/L / AST: 13 u/L / GGT: x               Culture - Tissue with Gram Stain (collected 25 Nov 2020 16:58)  Source: .Tissue Right Lower Lobe  Gram Stain (25 Nov 2020 21:57):    Moderate polymorphonuclear leukocytes per low power field    No organisms seen    Culture - Body Fluid with Gram Stain (collected 25 Nov 2020 16:48)  Source: .Body Fluid #1 (R) PLEURAL  Gram Stain (25 Nov 2020 20:29):    polymorphonuclear leukocytes seen    No organisms seen    by cytocentrifuge    Culture - Acid Fast - Body Fluid w/Smear (collected 25 Nov 2020 16:48)  Source: .Body Fluid #1 (R) PLEURAL      CXR:  < from: Xray Chest 1 View- PORTABLE-Urgent (11.25.20 @ 15:54) >  IMPRESSION:    Lines and tubes in satisfactory position.    Small right pleural effusion. No pneumothorax. The left lung is clear      Plan:    General: 45yMale s/p Flexible bronchoscopy, right thoracotomy, RL Lobectomy, post-operative bronchoscopy 28F R angle diaphragmatic CT, 28F straight posterior CT. , experiencing  pain with deep breathing.                             Neuro:                                         Pain control with Fentanyl drip /  Tylenol IV                            Cardiovascular:                                           Septic shock: Continue hemodynamic monitoring and titrate Levophed to MAP>65                            Respiratory:                                         Pt is on full mechanical vent support JP--40-5     CPAP wean and possible extubation today                                         Comfortable, not in any distress.                                         Monitor chest tube output                                         Chest tube to suction                                                                  Continue bronchodilators, pulmonary toilet     Hemoptysis resolved after lobectomy                            GI                                         NPO                                         Continue Zofran / Reglan for nausea - PRN     Protonix for GI prophylaxis	                                                                 Renal:                                         DEISY: Resolved. In diuretic phase                                         Monitor I/Os and electrolytes                                         Maurer                                                  Hem/ Onc:                                                                                  Monitor chest tube output &  signs of bleeding.                                          Follow CBC in AM                           Infectious disease:     Septic shock: Continue Vanco + Zosyn. Monitor Vanco level                                         Monitor for fever / leukocytosis.                                         All surgical incision / chest tube  sites look clean                            Endocrine                                           Continue Accu-Checks with coverage    Pt is on SQ Heparin and Venodyne boots for DVT prophylaxis.     Pertinent clinical, laboratory, radiographic, hemodynamic, echocardiographic, respiratory data, microbiologic data and chart were reviewed and analyzed frequently throughout the course of the day and night  Patient seen, examined and plan discussed with CT Surgeon Dr. King  / CTICU team during rounds.    I have spent  80 minutes of critical care time with this pt between 7am  and  11.59pm             Mak Mcleod MD

## 2020-11-27 LAB
-  AMIKACIN: SIGNIFICANT CHANGE UP
-  AMIKACIN: SIGNIFICANT CHANGE UP
-  AMOXICILLIN/CLAVULANIC ACID: SIGNIFICANT CHANGE UP
-  AMOXICILLIN/CLAVULANIC ACID: SIGNIFICANT CHANGE UP
-  AMPICILLIN/SULBACTAM: SIGNIFICANT CHANGE UP
-  AMPICILLIN/SULBACTAM: SIGNIFICANT CHANGE UP
-  AMPICILLIN: SIGNIFICANT CHANGE UP
-  AMPICILLIN: SIGNIFICANT CHANGE UP
-  AZTREONAM: SIGNIFICANT CHANGE UP
-  AZTREONAM: SIGNIFICANT CHANGE UP
-  CEFAZOLIN: SIGNIFICANT CHANGE UP
-  CEFAZOLIN: SIGNIFICANT CHANGE UP
-  CEFEPIME: SIGNIFICANT CHANGE UP
-  CEFEPIME: SIGNIFICANT CHANGE UP
-  CEFOXITIN: SIGNIFICANT CHANGE UP
-  CEFOXITIN: SIGNIFICANT CHANGE UP
-  CEFTRIAXONE: SIGNIFICANT CHANGE UP
-  CEFTRIAXONE: SIGNIFICANT CHANGE UP
-  CIPROFLOXACIN: SIGNIFICANT CHANGE UP
-  CIPROFLOXACIN: SIGNIFICANT CHANGE UP
-  ERTAPENEM: SIGNIFICANT CHANGE UP
-  ERTAPENEM: SIGNIFICANT CHANGE UP
-  GENTAMICIN: SIGNIFICANT CHANGE UP
-  GENTAMICIN: SIGNIFICANT CHANGE UP
-  IMIPENEM: SIGNIFICANT CHANGE UP
-  IMIPENEM: SIGNIFICANT CHANGE UP
-  LEVOFLOXACIN: SIGNIFICANT CHANGE UP
-  LEVOFLOXACIN: SIGNIFICANT CHANGE UP
-  MEROPENEM: SIGNIFICANT CHANGE UP
-  MEROPENEM: SIGNIFICANT CHANGE UP
-  PIPERACILLIN/TAZOBACTAM: SIGNIFICANT CHANGE UP
-  PIPERACILLIN/TAZOBACTAM: SIGNIFICANT CHANGE UP
-  TOBRAMYCIN: SIGNIFICANT CHANGE UP
-  TOBRAMYCIN: SIGNIFICANT CHANGE UP
-  TRIMETHOPRIM/SULFAMETHOXAZOLE: SIGNIFICANT CHANGE UP
-  TRIMETHOPRIM/SULFAMETHOXAZOLE: SIGNIFICANT CHANGE UP
ANION GAP SERPL CALC-SCNC: 9 MMO/L — SIGNIFICANT CHANGE UP (ref 7–14)
BASE EXCESS BLDA CALC-SCNC: 0 MMOL/L — SIGNIFICANT CHANGE UP
BASE EXCESS BLDA CALC-SCNC: 1.3 MMOL/L — SIGNIFICANT CHANGE UP
BUN SERPL-MCNC: 8 MG/DL — SIGNIFICANT CHANGE UP (ref 7–23)
CA-I BLDA-SCNC: 1.18 MMOL/L — SIGNIFICANT CHANGE UP (ref 1.15–1.29)
CA-I BLDA-SCNC: 1.2 MMOL/L — SIGNIFICANT CHANGE UP (ref 1.15–1.29)
CALCIUM SERPL-MCNC: 8.8 MG/DL — SIGNIFICANT CHANGE UP (ref 8.4–10.5)
CHLORIDE SERPL-SCNC: 106 MMOL/L — SIGNIFICANT CHANGE UP (ref 98–107)
CO2 SERPL-SCNC: 25 MMOL/L — SIGNIFICANT CHANGE UP (ref 22–31)
CREAT SERPL-MCNC: 0.92 MG/DL — SIGNIFICANT CHANGE UP (ref 0.5–1.3)
GLUCOSE BLDA-MCNC: 129 MG/DL — HIGH (ref 70–99)
GLUCOSE BLDA-MCNC: 132 MG/DL — HIGH (ref 70–99)
GLUCOSE SERPL-MCNC: 129 MG/DL — HIGH (ref 70–99)
HCO3 BLDA-SCNC: 24 MMOL/L — SIGNIFICANT CHANGE UP (ref 22–26)
HCO3 BLDA-SCNC: 25 MMOL/L — SIGNIFICANT CHANGE UP (ref 22–26)
HCT VFR BLD CALC: 35.7 % — LOW (ref 39–50)
HCT VFR BLDA CALC: 35.7 % — LOW (ref 39–51)
HCT VFR BLDA CALC: 42.6 % — SIGNIFICANT CHANGE UP (ref 39–51)
HGB BLD-MCNC: 11.4 G/DL — LOW (ref 13–17)
HGB BLDA-MCNC: 11.6 G/DL — LOW (ref 13–17)
HGB BLDA-MCNC: 13.9 G/DL — SIGNIFICANT CHANGE UP (ref 13–17)
LACTATE BLDA-SCNC: 1 MMOL/L — SIGNIFICANT CHANGE UP (ref 0.5–2)
LACTATE BLDA-SCNC: 1.3 MMOL/L — SIGNIFICANT CHANGE UP (ref 0.5–2)
MAGNESIUM SERPL-MCNC: 2.1 MG/DL — SIGNIFICANT CHANGE UP (ref 1.6–2.6)
MCHC RBC-ENTMCNC: 28.9 PG — SIGNIFICANT CHANGE UP (ref 27–34)
MCHC RBC-ENTMCNC: 31.9 % — LOW (ref 32–36)
MCV RBC AUTO: 90.6 FL — SIGNIFICANT CHANGE UP (ref 80–100)
METHOD TYPE: SIGNIFICANT CHANGE UP
METHOD TYPE: SIGNIFICANT CHANGE UP
NRBC # FLD: 0 K/UL — SIGNIFICANT CHANGE UP (ref 0–0)
PCO2 BLDA: 40 MMHG — SIGNIFICANT CHANGE UP (ref 35–48)
PCO2 BLDA: 46 MMHG — SIGNIFICANT CHANGE UP (ref 35–48)
PH BLDA: 7.37 PH — SIGNIFICANT CHANGE UP (ref 7.35–7.45)
PH BLDA: 7.4 PH — SIGNIFICANT CHANGE UP (ref 7.35–7.45)
PHOSPHATE SERPL-MCNC: 1.6 MG/DL — LOW (ref 2.5–4.5)
PLATELET # BLD AUTO: 192 K/UL — SIGNIFICANT CHANGE UP (ref 150–400)
PMV BLD: 9.9 FL — SIGNIFICANT CHANGE UP (ref 7–13)
PO2 BLDA: 127 MMHG — HIGH (ref 83–108)
PO2 BLDA: 45 MMHG — CRITICAL LOW (ref 83–108)
POTASSIUM BLDA-SCNC: 3.5 MMOL/L — SIGNIFICANT CHANGE UP (ref 3.4–4.5)
POTASSIUM BLDA-SCNC: 3.5 MMOL/L — SIGNIFICANT CHANGE UP (ref 3.4–4.5)
POTASSIUM SERPL-MCNC: 3.7 MMOL/L — SIGNIFICANT CHANGE UP (ref 3.5–5.3)
POTASSIUM SERPL-SCNC: 3.7 MMOL/L — SIGNIFICANT CHANGE UP (ref 3.5–5.3)
RBC # BLD: 3.94 M/UL — LOW (ref 4.2–5.8)
RBC # FLD: 13.4 % — SIGNIFICANT CHANGE UP (ref 10.3–14.5)
SAO2 % BLDA: 81.7 % — LOW (ref 95–99)
SAO2 % BLDA: 98.8 % — SIGNIFICANT CHANGE UP (ref 95–99)
SODIUM BLDA-SCNC: 140 MMOL/L — SIGNIFICANT CHANGE UP (ref 136–146)
SODIUM BLDA-SCNC: 140 MMOL/L — SIGNIFICANT CHANGE UP (ref 136–146)
SODIUM SERPL-SCNC: 140 MMOL/L — SIGNIFICANT CHANGE UP (ref 135–145)
WBC # BLD: 15.11 K/UL — HIGH (ref 3.8–10.5)
WBC # FLD AUTO: 15.11 K/UL — HIGH (ref 3.8–10.5)

## 2020-11-27 PROCEDURE — 71045 X-RAY EXAM CHEST 1 VIEW: CPT | Mod: 26

## 2020-11-27 PROCEDURE — 99233 SBSQ HOSP IP/OBS HIGH 50: CPT

## 2020-11-27 RX ORDER — ALBUMIN HUMAN 25 %
250 VIAL (ML) INTRAVENOUS ONCE
Refills: 0 | Status: COMPLETED | OUTPATIENT
Start: 2020-11-27 | End: 2020-11-27

## 2020-11-27 RX ORDER — POTASSIUM PHOSPHATE, MONOBASIC POTASSIUM PHOSPHATE, DIBASIC 236; 224 MG/ML; MG/ML
15 INJECTION, SOLUTION INTRAVENOUS EVERY 6 HOURS
Refills: 0 | Status: COMPLETED | OUTPATIENT
Start: 2020-11-27 | End: 2020-11-27

## 2020-11-27 RX ORDER — SODIUM CHLORIDE 9 MG/ML
1000 INJECTION, SOLUTION INTRAVENOUS
Refills: 0 | Status: DISCONTINUED | OUTPATIENT
Start: 2020-11-27 | End: 2020-11-29

## 2020-11-27 RX ORDER — ACETAMINOPHEN 500 MG
1000 TABLET ORAL ONCE
Refills: 0 | Status: COMPLETED | OUTPATIENT
Start: 2020-11-27 | End: 2020-11-27

## 2020-11-27 RX ORDER — SODIUM CHLORIDE 9 MG/ML
1000 INJECTION, SOLUTION INTRAVENOUS
Refills: 0 | Status: DISCONTINUED | OUTPATIENT
Start: 2020-11-27 | End: 2020-11-27

## 2020-11-27 RX ORDER — POTASSIUM CHLORIDE 20 MEQ
20 PACKET (EA) ORAL ONCE
Refills: 0 | Status: COMPLETED | OUTPATIENT
Start: 2020-11-27 | End: 2020-11-27

## 2020-11-27 RX ORDER — LIDOCAINE 4 G/100G
1 CREAM TOPICAL EVERY 24 HOURS
Refills: 0 | Status: DISCONTINUED | OUTPATIENT
Start: 2020-11-27 | End: 2020-11-29

## 2020-11-27 RX ORDER — DORNASE ALFA 1 MG/ML
2.5 SOLUTION RESPIRATORY (INHALATION) EVERY 12 HOURS
Refills: 0 | Status: DISCONTINUED | OUTPATIENT
Start: 2020-11-27 | End: 2020-12-02

## 2020-11-27 RX ADMIN — DORNASE ALFA 2.5 MILLIGRAM(S): 1 SOLUTION RESPIRATORY (INHALATION) at 22:35

## 2020-11-27 RX ADMIN — PIPERACILLIN AND TAZOBACTAM 25 GRAM(S): 4; .5 INJECTION, POWDER, LYOPHILIZED, FOR SOLUTION INTRAVENOUS at 02:34

## 2020-11-27 RX ADMIN — PIPERACILLIN AND TAZOBACTAM 25 GRAM(S): 4; .5 INJECTION, POWDER, LYOPHILIZED, FOR SOLUTION INTRAVENOUS at 17:50

## 2020-11-27 RX ADMIN — SODIUM CHLORIDE 50 MILLILITER(S): 9 INJECTION, SOLUTION INTRAVENOUS at 12:17

## 2020-11-27 RX ADMIN — DORNASE ALFA 2.5 MILLIGRAM(S): 1 SOLUTION RESPIRATORY (INHALATION) at 09:52

## 2020-11-27 RX ADMIN — PANTOPRAZOLE SODIUM 40 MILLIGRAM(S): 20 TABLET, DELAYED RELEASE ORAL at 06:10

## 2020-11-27 RX ADMIN — Medication 400 MILLIGRAM(S): at 19:00

## 2020-11-27 RX ADMIN — Medication 125 MILLILITER(S): at 12:21

## 2020-11-27 RX ADMIN — Medication 1000 MILLIGRAM(S): at 04:00

## 2020-11-27 RX ADMIN — PIPERACILLIN AND TAZOBACTAM 25 GRAM(S): 4; .5 INJECTION, POWDER, LYOPHILIZED, FOR SOLUTION INTRAVENOUS at 11:44

## 2020-11-27 RX ADMIN — Medication 1000 MILLIGRAM(S): at 20:30

## 2020-11-27 RX ADMIN — SODIUM CHLORIDE 4 MILLILITER(S): 9 INJECTION INTRAMUSCULAR; INTRAVENOUS; SUBCUTANEOUS at 22:34

## 2020-11-27 RX ADMIN — Medication 166.67 MILLIGRAM(S): at 06:11

## 2020-11-27 RX ADMIN — Medication 400 MILLIGRAM(S): at 03:00

## 2020-11-27 RX ADMIN — SODIUM CHLORIDE 4 MILLILITER(S): 9 INJECTION INTRAMUSCULAR; INTRAVENOUS; SUBCUTANEOUS at 16:05

## 2020-11-27 RX ADMIN — Medication 20 MILLIEQUIVALENT(S): at 06:47

## 2020-11-27 RX ADMIN — SODIUM CHLORIDE 4 MILLILITER(S): 9 INJECTION INTRAMUSCULAR; INTRAVENOUS; SUBCUTANEOUS at 03:50

## 2020-11-27 RX ADMIN — HEPARIN SODIUM 5000 UNIT(S): 5000 INJECTION INTRAVENOUS; SUBCUTANEOUS at 21:50

## 2020-11-27 RX ADMIN — HEPARIN SODIUM 5000 UNIT(S): 5000 INJECTION INTRAVENOUS; SUBCUTANEOUS at 14:03

## 2020-11-27 RX ADMIN — CHLORHEXIDINE GLUCONATE 1 APPLICATION(S): 213 SOLUTION TOPICAL at 21:50

## 2020-11-27 RX ADMIN — POTASSIUM PHOSPHATE, MONOBASIC POTASSIUM PHOSPHATE, DIBASIC 62.5 MILLIMOLE(S): 236; 224 INJECTION, SOLUTION INTRAVENOUS at 17:50

## 2020-11-27 RX ADMIN — HEPARIN SODIUM 5000 UNIT(S): 5000 INJECTION INTRAVENOUS; SUBCUTANEOUS at 06:10

## 2020-11-27 RX ADMIN — POTASSIUM PHOSPHATE, MONOBASIC POTASSIUM PHOSPHATE, DIBASIC 62.5 MILLIMOLE(S): 236; 224 INJECTION, SOLUTION INTRAVENOUS at 14:03

## 2020-11-27 RX ADMIN — HYDROMORPHONE HYDROCHLORIDE 30 MILLILITER(S): 2 INJECTION INTRAMUSCULAR; INTRAVENOUS; SUBCUTANEOUS at 19:30

## 2020-11-27 RX ADMIN — SODIUM CHLORIDE 4 MILLILITER(S): 9 INJECTION INTRAMUSCULAR; INTRAVENOUS; SUBCUTANEOUS at 09:55

## 2020-11-27 NOTE — PROGRESS NOTE ADULT - SUBJECTIVE AND OBJECTIVE BOX
Anesthesia Pain Management Service    SUBJECTIVE: Pt doing well with IV PCA without problems reported.    Therapy:	  [ X] IV PCA	   [ ] Epidural           [ ] s/p Spinal Opoid              [ ] Postpartum infusion	  [ ] Patient controlled regional anesthesia (PCRA)    [ ] prn Analgesics    Allergies    No Known Allergies    Intolerances      MEDICATIONS  (STANDING):  acetaminophen  IVPB .. 1000 milliGRAM(s) IV Intermittent once  chlorhexidine 4% Liquid 1 Application(s) Topical at bedtime  dornase samy Solution 2.5 milliGRAM(s) Inhalation every 12 hours  heparin   Injectable 5000 Unit(s) SubCutaneous every 8 hours  HYDROmorphone PCA (1 mG/mL) 30 milliLiter(s) PCA Continuous PCA Continuous  lidocaine   Patch 1 Patch Transdermal every 24 hours  pantoprazole  Injectable 40 milliGRAM(s) IV Push every 24 hours  piperacillin/tazobactam IVPB.. 3.375 Gram(s) IV Intermittent every 8 hours  potassium phosphate IVPB 15 milliMole(s) IV Intermittent every 6 hours  sodium chloride 0.45%. 1000 milliLiter(s) (50 mL/Hr) IV Continuous <Continuous>  sodium chloride 3%  Inhalation 4 milliLiter(s) Inhalation every 6 hours    MEDICATIONS  (PRN):  HYDROmorphone PCA (1 mG/mL) Rescue Clinician Bolus 0.5 milliGRAM(s) IV Push every 15 minutes PRN for Pain Scale GREATER THAN 6  naloxone Injectable 0.1 milliGRAM(s) IV Push every 3 minutes PRN For ANY of the following changes in patient status:  A. RR LESS THAN 10 breaths per minute, B. Oxygen saturation LESS THAN 90%, C. Sedation score of 6  ondansetron Injectable 4 milliGRAM(s) IV Push every 6 hours PRN Nausea and/or Vomiting      OBJECTIVE:   [X] No new signs     [ ] Other:    Side Effects:  [X ] None			[ ] Other:    Assessment of Catheter Site:		[ ] Intact		[ ] Other:    ASSESSMENT/PLAN  [ X] Continue current therapy. Recommend non-opioid adjuvant analgesics to be used when possible and when allowed by primary surgical team.    [ ] Therapy changed to:    [ ] IV PCA       [ ] Epidural     [ ] prn Analgesics     Comments:    Progress Note written now but Patient was seen earlier.

## 2020-11-27 NOTE — PATIENT PROFILE ADULT - CONTRAINDICATIONS & PRECAUTIONS (SELECT ALL THAT APPLY)
MAX on VM informing her that she needs to come in to the lab during normal business hours to have a blood tests drawn that was not drawn yesterday. I Apologized for any inconvenience this may cause.      Tracy in the lab, informed me that they no longer add on labs, any add on's must be done by the MD or MA.       none...

## 2020-11-27 NOTE — PROGRESS NOTE ADULT - SUBJECTIVE AND OBJECTIVE BOX
PROCEDURE: right thoracotomy, RLLobectomy 11/25/2020      ISSUES:   Hypoxia s/p extubation (11/26)  Massive Hemoptysis  Lung abscess  Empyema  Post op pain  Chest tube in place  HTN      INTERVAL EVENTS:   Pt extubated yesterday.  Off pressors yesterday.  Renal function improved.  Arterial line, central line, vargas to be removed today.      HISTORY:   Patient reports moderate pain at chest wall incision sites which is worse with coughing and deep breathing without associated fever or dyspnea. Pain is improved with use of pain meds.       PHYSICAL EXAM:   Gen: Comfortable, No acute distress  Eyes: Sclera white, Conjunctiva normal, Eyelids normal, Pupils symmetrical   ENT: Mucous membranes moist,  Neck: Trachea midline,  ,  ,  ,  ,    CV: Rate regular, Rhythm regular,  ,  ,    Resp: Breath sounds clear, No accessory muscles use,  ,    Abd: Soft, Non-distended, Non-tender, Bowel sounds normal,  ,  ,    Skin: Warm, No peripheral edema of lower extremities,  ,    : No vargas  Neuro: Moving all 4 extremities,    Psych: A&OX3      ASSESSMENT AND PLAN:     NEURO:  Post-operative Pain - Pain control with PCA and Tylenol IV PRN.              RESPIRATORY:  Hypoxia. S/p extubation (11/26).  Wean NC for goal O2sat above 92.    Chest tube – Pleurevac regulated suctioning. Monitor chest tube output.               CARDIOVASCULAR:  Septic shock requiring IV pressors - Resolved. Use pressors for MAP goal of 65 as needed.  HTN - hemodynamically stable. hold home antihypertensives.             RENAL:  DEISY – Resolved. Renally dose medications. Monitor for hyperkalemia and uremia. Avoid nephrotoxic medications. Monitor IOs and electrolytes.         GASTROINTESTINAL:  GI prophylaxis with pantoprazole  Zofran and Reglan IV PRN for nausea  Regular diet     HEMATOLOGIC:  No signs of active bleeding. Monitor Hgb in CBC in AM  DVT prophylaxis with heparin subQ and SCDs.         INFECTIOUS DISEASE:  All surgical sites appear clean. Will monitor for fever and leukocytosis.      Klebsiella necrotizing pneumonia and empyema -  Zosyn.      Follow up cultures           ENDOCRINE:  Stable – Monitor glucose fingersticks for goal 120-180.            Pertinent clinical, laboratory, radiographic, hemodynamic, echocardiographic, respiratory data, microbiologic data and chart were reviewed by myself and analyzed frequently throughout the course of the day and night by myself.    Plan discussed at length with the CTICU staff and Attending CT Surgeon.     Patient's status was discussed with patient at the bedside.    _________________________  VITAL SIGNS:  Vital Signs Last 24 Hrs  T(C): 37.2 (27 Nov 2020 08:00), Max: 38.7 (26 Nov 2020 19:00)  T(F): 99 (27 Nov 2020 08:00), Max: 101.7 (26 Nov 2020 19:00)  HR: 110 (27 Nov 2020 10:00) (88 - 129)  BP: 105/57 (26 Nov 2020 20:00) (105/57 - 105/57)  BP(mean): 68 (26 Nov 2020 20:00) (68 - 68)  RR: 21 (27 Nov 2020 09:00) (14 - 21)  SpO2: 100% (27 Nov 2020 09:52) (96% - 100%)  I/Os:   I&O's Detail    26 Nov 2020 07:01  -  27 Nov 2020 07:00  --------------------------------------------------------  IN:    FentaNYL: 34 mL    IV PiggyBack: 250 mL    Lactated Ringers: 770 mL    Norepinephrine: 121 mL    Propofol: 60 mL  Total IN: 1235 mL    OUT:    Chest Tube (mL): 110 mL    Chest Tube (mL): 100 mL    Indwelling Catheter - Urethral (mL): 2815 mL  Total OUT: 3025 mL    Total NET: -1790 mL          Mode: CPAP with PS  FiO2: 40  PEEP: 5  PS: 5  MAP: 7      MEDICATIONS:  MEDICATIONS  (STANDING):  acetaminophen  IVPB .. 1000 milliGRAM(s) IV Intermittent once  albumin human  5% IVPB 250 milliLiter(s) IV Intermittent once  chlorhexidine 4% Liquid 1 Application(s) Topical at bedtime  dornase samy Solution 2.5 milliGRAM(s) Inhalation every 12 hours  heparin   Injectable 5000 Unit(s) SubCutaneous every 8 hours  HYDROmorphone PCA (1 mG/mL) 30 milliLiter(s) PCA Continuous PCA Continuous  lidocaine   Patch 1 Patch Transdermal every 24 hours  pantoprazole  Injectable 40 milliGRAM(s) IV Push every 24 hours  piperacillin/tazobactam IVPB.. 3.375 Gram(s) IV Intermittent every 8 hours  potassium phosphate IVPB 15 milliMole(s) IV Intermittent every 6 hours  sodium chloride 0.45%. 1000 milliLiter(s) (50 mL/Hr) IV Continuous <Continuous>  sodium chloride 3%  Inhalation 4 milliLiter(s) Inhalation every 6 hours    MEDICATIONS  (PRN):  HYDROmorphone PCA (1 mG/mL) Rescue Clinician Bolus 0.5 milliGRAM(s) IV Push every 15 minutes PRN for Pain Scale GREATER THAN 6  naloxone Injectable 0.1 milliGRAM(s) IV Push every 3 minutes PRN For ANY of the following changes in patient status:  A. RR LESS THAN 10 breaths per minute, B. Oxygen saturation LESS THAN 90%, C. Sedation score of 6  ondansetron Injectable 4 milliGRAM(s) IV Push every 6 hours PRN Nausea and/or Vomiting      LABS:                        11.4   15.11 )-----------( 192      ( 27 Nov 2020 04:39 )             35.7     11-27    140  |  106  |  8   ----------------------------<  129<H>  3.7   |  25  |  0.92    Ca    8.8      27 Nov 2020 04:39  Phos  1.6     11-27  Mg     2.1     11-27        PT/INR - ( 26 Nov 2020 04:34 )   PT: 17.4 SEC;   INR: 1.54          PTT - ( 26 Nov 2020 04:34 )  PTT:35.8 SEC  ABG - ( 27 Nov 2020 05:40 )  pH, Arterial: 7.40  pH, Blood: x     /  pCO2: 40    /  pO2: 127   / HCO3: 24    / Base Excess: 0.0   /  SaO2: 98.8                _________________________       PROCEDURE: right thoracotomy, RLLobectomy 11/25/2020      ISSUES:   Hypoxia s/p extubation (11/26)  Severe sepsis  Massive Hemoptysis  Lung abscess  Empyema  Post op pain  Chest tube in place  HTN      INTERVAL EVENTS:   Pt extubated yesterday.  Off pressors yesterday.  Renal function improved.  Arterial line, central line, vargas to be removed today.      HISTORY:   Patient reports moderate pain at chest wall incision sites which is worse with coughing and deep breathing without associated fever or dyspnea. Pain is improved with use of pain meds.       PHYSICAL EXAM:   Gen: Comfortable, No acute distress  Eyes: Sclera white, Conjunctiva normal, Eyelids normal, Pupils symmetrical   ENT: Mucous membranes moist,  Neck: Trachea midline,  ,  ,  ,  ,    CV: Rate regular, Rhythm regular,  ,  ,    Resp: Breath sounds clear, No accessory muscles use,  ,    Abd: Soft, Non-distended, Non-tender, Bowel sounds normal,  ,  ,    Skin: Warm, No peripheral edema of lower extremities,  ,    : No vargas  Neuro: Moving all 4 extremities,    Psych: A&OX3      ASSESSMENT AND PLAN:     NEURO:  Post-operative Pain - Pain control with PCA and Tylenol IV PRN.              RESPIRATORY:  Hypoxia. S/p extubation (11/26).  Wean NC for goal O2sat above 92.    Chest tube – Pleurevac regulated suctioning. Monitor chest tube output.               CARDIOVASCULAR:  Septic shock requiring IV pressors - Resolved. Use pressors for MAP goal of 65 as needed.  HTN - hemodynamically stable. hold home antihypertensives.             RENAL:  DEISY – Resolved. Renally dose medications. Monitor for hyperkalemia and uremia. Avoid nephrotoxic medications. Monitor IOs and electrolytes.         GASTROINTESTINAL:  GI prophylaxis with pantoprazole  Zofran and Reglan IV PRN for nausea  Regular diet     HEMATOLOGIC:  No signs of active bleeding. Monitor Hgb in CBC in AM  DVT prophylaxis with heparin subQ and SCDs.         INFECTIOUS DISEASE:  All surgical sites appear clean. Will monitor for fever and leukocytosis.      Klebsiella necrotizing pneumonia and empyema -  Zosyn.      Follow up cultures           ENDOCRINE:  Stable – Monitor glucose fingersticks for goal 120-180.            Pertinent clinical, laboratory, radiographic, hemodynamic, echocardiographic, respiratory data, microbiologic data and chart were reviewed by myself and analyzed frequently throughout the course of the day and night by myself.    Plan discussed at length with the CTICU staff and Attending CT Surgeon.     Patient's status was discussed with patient at the bedside.    _________________________  VITAL SIGNS:  Vital Signs Last 24 Hrs  T(C): 37.2 (27 Nov 2020 08:00), Max: 38.7 (26 Nov 2020 19:00)  T(F): 99 (27 Nov 2020 08:00), Max: 101.7 (26 Nov 2020 19:00)  HR: 110 (27 Nov 2020 10:00) (88 - 129)  BP: 105/57 (26 Nov 2020 20:00) (105/57 - 105/57)  BP(mean): 68 (26 Nov 2020 20:00) (68 - 68)  RR: 21 (27 Nov 2020 09:00) (14 - 21)  SpO2: 100% (27 Nov 2020 09:52) (96% - 100%)  I/Os:   I&O's Detail    26 Nov 2020 07:01  -  27 Nov 2020 07:00  --------------------------------------------------------  IN:    FentaNYL: 34 mL    IV PiggyBack: 250 mL    Lactated Ringers: 770 mL    Norepinephrine: 121 mL    Propofol: 60 mL  Total IN: 1235 mL    OUT:    Chest Tube (mL): 110 mL    Chest Tube (mL): 100 mL    Indwelling Catheter - Urethral (mL): 2815 mL  Total OUT: 3025 mL    Total NET: -1790 mL          Mode: CPAP with PS  FiO2: 40  PEEP: 5  PS: 5  MAP: 7      MEDICATIONS:  MEDICATIONS  (STANDING):  acetaminophen  IVPB .. 1000 milliGRAM(s) IV Intermittent once  albumin human  5% IVPB 250 milliLiter(s) IV Intermittent once  chlorhexidine 4% Liquid 1 Application(s) Topical at bedtime  dornase samy Solution 2.5 milliGRAM(s) Inhalation every 12 hours  heparin   Injectable 5000 Unit(s) SubCutaneous every 8 hours  HYDROmorphone PCA (1 mG/mL) 30 milliLiter(s) PCA Continuous PCA Continuous  lidocaine   Patch 1 Patch Transdermal every 24 hours  pantoprazole  Injectable 40 milliGRAM(s) IV Push every 24 hours  piperacillin/tazobactam IVPB.. 3.375 Gram(s) IV Intermittent every 8 hours  potassium phosphate IVPB 15 milliMole(s) IV Intermittent every 6 hours  sodium chloride 0.45%. 1000 milliLiter(s) (50 mL/Hr) IV Continuous <Continuous>  sodium chloride 3%  Inhalation 4 milliLiter(s) Inhalation every 6 hours    MEDICATIONS  (PRN):  HYDROmorphone PCA (1 mG/mL) Rescue Clinician Bolus 0.5 milliGRAM(s) IV Push every 15 minutes PRN for Pain Scale GREATER THAN 6  naloxone Injectable 0.1 milliGRAM(s) IV Push every 3 minutes PRN For ANY of the following changes in patient status:  A. RR LESS THAN 10 breaths per minute, B. Oxygen saturation LESS THAN 90%, C. Sedation score of 6  ondansetron Injectable 4 milliGRAM(s) IV Push every 6 hours PRN Nausea and/or Vomiting      LABS:                        11.4   15.11 )-----------( 192      ( 27 Nov 2020 04:39 )             35.7     11-27    140  |  106  |  8   ----------------------------<  129<H>  3.7   |  25  |  0.92    Ca    8.8      27 Nov 2020 04:39  Phos  1.6     11-27  Mg     2.1     11-27        PT/INR - ( 26 Nov 2020 04:34 )   PT: 17.4 SEC;   INR: 1.54          PTT - ( 26 Nov 2020 04:34 )  PTT:35.8 SEC  ABG - ( 27 Nov 2020 05:40 )  pH, Arterial: 7.40  pH, Blood: x     /  pCO2: 40    /  pO2: 127   / HCO3: 24    / Base Excess: 0.0   /  SaO2: 98.8                _________________________

## 2020-11-27 NOTE — PROGRESS NOTE ADULT - SUBJECTIVE AND OBJECTIVE BOX
Anesthesia Pain Management Service    SUBJECTIVE: Patient is doing well with IV PCA and no significant problems reported.  Patient does not describe pain, but he is complaining of lots of pressure and he said he feels warm.    Pain Scale Score	At rest: ___ 	With Activity: ___ 	[X ] Refer to charted pain scores    THERAPY:    [ ] IV PCA Morphine		[ ] 5 mg/mL	[ ] 1 mg/mL  [X ] IV PCA Hydromorphone	[ ] 5 mg/mL	[X ] 1 mg/mL  [ ] IV PCA Fentanyl		[ ] 50 micrograms/mL    Demand dose __0.2_ lockout __6_ (minutes) Continuous Rate _0__ Total:  0.8 ___  mg used (in past 24 hours)      MEDICATIONS  (STANDING):  acetaminophen  IVPB .. 1000 milliGRAM(s) IV Intermittent once  chlorhexidine 4% Liquid 1 Application(s) Topical at bedtime  heparin   Injectable 5000 Unit(s) SubCutaneous every 8 hours  HYDROmorphone PCA (1 mG/mL) 30 milliLiter(s) PCA Continuous PCA Continuous  lactated ringers. 1000 milliLiter(s) (50 mL/Hr) IV Continuous <Continuous>  pantoprazole  Injectable 40 milliGRAM(s) IV Push every 24 hours  piperacillin/tazobactam IVPB.. 3.375 Gram(s) IV Intermittent every 8 hours  potassium phosphate IVPB 15 milliMole(s) IV Intermittent every 6 hours  sodium chloride 3%  Inhalation 4 milliLiter(s) Inhalation every 6 hours    MEDICATIONS  (PRN):  HYDROmorphone PCA (1 mG/mL) Rescue Clinician Bolus 0.5 milliGRAM(s) IV Push every 15 minutes PRN for Pain Scale GREATER THAN 6  naloxone Injectable 0.1 milliGRAM(s) IV Push every 3 minutes PRN For ANY of the following changes in patient status:  A. RR LESS THAN 10 breaths per minute, B. Oxygen saturation LESS THAN 90%, C. Sedation score of 6  ondansetron Injectable 4 milliGRAM(s) IV Push every 6 hours PRN Nausea and/or Vomiting      OBJECTIVE:  Patient is sitting up in chair.    Sedation Score:	[ X] Alert	 [ ] Drowsy 	[ ] Arousable	[ ] Asleep	[ ] Unresponsive    Side Effects:	[X ] None	[ ] Nausea	[ ] Vomiting	[ ] Pruritus  		[ ] Other:    Vital Signs Last 24 Hrs  T(C): 37.2 (27 Nov 2020 08:00), Max: 38.7 (26 Nov 2020 19:00)  T(F): 99 (27 Nov 2020 08:00), Max: 101.7 (26 Nov 2020 19:00)  HR: 97 (27 Nov 2020 08:00) (62 - 129)  BP: 105/57 (26 Nov 2020 20:00) (105/57 - 105/57)  BP(mean): 68 (26 Nov 2020 20:00) (68 - 68)  RR: 18 (27 Nov 2020 08:00) (14 - 19)  SpO2: 99% (27 Nov 2020 08:00) (96% - 100%)    ASSESSMENT/ PLAN    Therapy to  be:	[ X] Continue   [ ] Discontinued   [ ] Change to prn Analgesics    Documentation and Verification of current medications:   [X] Done	[ ] Not done, not elligible    Comments: Will continue with IV Dilaudid PCA, and IV Tylenol. Lidocaine patch added.  Recommend non-opioid adjuvant analgesics to be used when possible and when allowed by primary surgical team.    Progress Note written now but Patient was seen earlier. Anesthesia Pain Management Service    SUBJECTIVE: Patient is doing well with IV PCA and no significant problems reported.  Patient does not describe pain, but he is complaining of lots of pressure and he said he feels warm.    Pain Scale Score	At rest: ___ 	With Activity: ___ 	[X ] Refer to charted pain scores    THERAPY:    [ ] IV PCA Morphine		[ ] 5 mg/mL	[ ] 1 mg/mL  [X ] IV PCA Hydromorphone	[ ] 5 mg/mL	[X ] 1 mg/mL  [ ] IV PCA Fentanyl		[ ] 50 micrograms/mL    Demand dose __0.2_ lockout __6_ (minutes) Continuous Rate _0__ Total:  0.8 ___  mg used (in past 24 hours)      MEDICATIONS  (STANDING):  acetaminophen  IVPB .. 1000 milliGRAM(s) IV Intermittent once  chlorhexidine 4% Liquid 1 Application(s) Topical at bedtime  heparin   Injectable 5000 Unit(s) SubCutaneous every 8 hours  HYDROmorphone PCA (1 mG/mL) 30 milliLiter(s) PCA Continuous PCA Continuous  lactated ringers. 1000 milliLiter(s) (50 mL/Hr) IV Continuous <Continuous>  pantoprazole  Injectable 40 milliGRAM(s) IV Push every 24 hours  piperacillin/tazobactam IVPB.. 3.375 Gram(s) IV Intermittent every 8 hours  potassium phosphate IVPB 15 milliMole(s) IV Intermittent every 6 hours  sodium chloride 3%  Inhalation 4 milliLiter(s) Inhalation every 6 hours    MEDICATIONS  (PRN):  HYDROmorphone PCA (1 mG/mL) Rescue Clinician Bolus 0.5 milliGRAM(s) IV Push every 15 minutes PRN for Pain Scale GREATER THAN 6  naloxone Injectable 0.1 milliGRAM(s) IV Push every 3 minutes PRN For ANY of the following changes in patient status:  A. RR LESS THAN 10 breaths per minute, B. Oxygen saturation LESS THAN 90%, C. Sedation score of 6  ondansetron Injectable 4 milliGRAM(s) IV Push every 6 hours PRN Nausea and/or Vomiting      OBJECTIVE:  Patient is sitting up in chair, with CT x2.    Sedation Score:	[ X] Alert	 [ ] Drowsy 	[ ] Arousable	[ ] Asleep	[ ] Unresponsive    Side Effects:	[X ] None	[ ] Nausea	[ ] Vomiting	[ ] Pruritus  		[ ] Other:    Vital Signs Last 24 Hrs  T(C): 37.2 (27 Nov 2020 08:00), Max: 38.7 (26 Nov 2020 19:00)  T(F): 99 (27 Nov 2020 08:00), Max: 101.7 (26 Nov 2020 19:00)  HR: 97 (27 Nov 2020 08:00) (62 - 129)  BP: 105/57 (26 Nov 2020 20:00) (105/57 - 105/57)  BP(mean): 68 (26 Nov 2020 20:00) (68 - 68)  RR: 18 (27 Nov 2020 08:00) (14 - 19)  SpO2: 99% (27 Nov 2020 08:00) (96% - 100%)    ASSESSMENT/ PLAN    Therapy to  be:	[ X] Continue   [ ] Discontinued   [ ] Change to prn Analgesics    Documentation and Verification of current medications:   [X] Done	[ ] Not done, not elligible    Comments: Will continue with IV Dilaudid PCA, and IV Tylenol. Lidocaine patch added.  Recommend non-opioid adjuvant analgesics to be used when possible and when allowed by primary surgical team.    Progress Note written now but Patient was seen earlier.

## 2020-11-28 LAB
ANION GAP SERPL CALC-SCNC: 12 MMO/L — SIGNIFICANT CHANGE UP (ref 7–14)
BUN SERPL-MCNC: 10 MG/DL — SIGNIFICANT CHANGE UP (ref 7–23)
CALCIUM SERPL-MCNC: 9.3 MG/DL — SIGNIFICANT CHANGE UP (ref 8.4–10.5)
CHLORIDE SERPL-SCNC: 102 MMOL/L — SIGNIFICANT CHANGE UP (ref 98–107)
CO2 SERPL-SCNC: 22 MMOL/L — SIGNIFICANT CHANGE UP (ref 22–31)
CREAT SERPL-MCNC: 0.76 MG/DL — SIGNIFICANT CHANGE UP (ref 0.5–1.3)
GLUCOSE SERPL-MCNC: 126 MG/DL — HIGH (ref 70–99)
HCT VFR BLD CALC: 37.6 % — LOW (ref 39–50)
HGB BLD-MCNC: 12 G/DL — LOW (ref 13–17)
MAGNESIUM SERPL-MCNC: 2.1 MG/DL — SIGNIFICANT CHANGE UP (ref 1.6–2.6)
MCHC RBC-ENTMCNC: 28.6 PG — SIGNIFICANT CHANGE UP (ref 27–34)
MCHC RBC-ENTMCNC: 31.9 % — LOW (ref 32–36)
MCV RBC AUTO: 89.7 FL — SIGNIFICANT CHANGE UP (ref 80–100)
NRBC # FLD: 0 K/UL — SIGNIFICANT CHANGE UP (ref 0–0)
PHOSPHATE SERPL-MCNC: 3 MG/DL — SIGNIFICANT CHANGE UP (ref 2.5–4.5)
PLATELET # BLD AUTO: 230 K/UL — SIGNIFICANT CHANGE UP (ref 150–400)
PMV BLD: 9.9 FL — SIGNIFICANT CHANGE UP (ref 7–13)
POTASSIUM SERPL-MCNC: 3.9 MMOL/L — SIGNIFICANT CHANGE UP (ref 3.5–5.3)
POTASSIUM SERPL-SCNC: 3.9 MMOL/L — SIGNIFICANT CHANGE UP (ref 3.5–5.3)
RBC # BLD: 4.19 M/UL — LOW (ref 4.2–5.8)
RBC # FLD: 13.1 % — SIGNIFICANT CHANGE UP (ref 10.3–14.5)
SODIUM SERPL-SCNC: 136 MMOL/L — SIGNIFICANT CHANGE UP (ref 135–145)
WBC # BLD: 13.4 K/UL — HIGH (ref 3.8–10.5)
WBC # FLD AUTO: 13.4 K/UL — HIGH (ref 3.8–10.5)

## 2020-11-28 PROCEDURE — 99233 SBSQ HOSP IP/OBS HIGH 50: CPT

## 2020-11-28 PROCEDURE — 71045 X-RAY EXAM CHEST 1 VIEW: CPT | Mod: 26

## 2020-11-28 RX ORDER — INFLUENZA VIRUS VACCINE 15; 15; 15; 15 UG/.5ML; UG/.5ML; UG/.5ML; UG/.5ML
0.5 SUSPENSION INTRAMUSCULAR ONCE
Refills: 0 | Status: DISCONTINUED | OUTPATIENT
Start: 2020-11-28 | End: 2020-11-29

## 2020-11-28 RX ORDER — LIDOCAINE 4 G/100G
1 CREAM TOPICAL EVERY 24 HOURS
Refills: 0 | Status: DISCONTINUED | OUTPATIENT
Start: 2020-11-28 | End: 2020-12-02

## 2020-11-28 RX ORDER — METOPROLOL TARTRATE 50 MG
25 TABLET ORAL EVERY 12 HOURS
Refills: 0 | Status: DISCONTINUED | OUTPATIENT
Start: 2020-11-28 | End: 2020-11-28

## 2020-11-28 RX ORDER — METOPROLOL TARTRATE 50 MG
12.5 TABLET ORAL EVERY 12 HOURS
Refills: 0 | Status: DISCONTINUED | OUTPATIENT
Start: 2020-11-28 | End: 2020-12-02

## 2020-11-28 RX ORDER — POTASSIUM CHLORIDE 20 MEQ
20 PACKET (EA) ORAL ONCE
Refills: 0 | Status: COMPLETED | OUTPATIENT
Start: 2020-11-28 | End: 2020-11-28

## 2020-11-28 RX ORDER — ACETAMINOPHEN 500 MG
650 TABLET ORAL EVERY 6 HOURS
Refills: 0 | Status: COMPLETED | OUTPATIENT
Start: 2020-11-28 | End: 2020-11-30

## 2020-11-28 RX ADMIN — Medication 20 MILLIEQUIVALENT(S): at 06:50

## 2020-11-28 RX ADMIN — HEPARIN SODIUM 5000 UNIT(S): 5000 INJECTION INTRAVENOUS; SUBCUTANEOUS at 21:02

## 2020-11-28 RX ADMIN — SODIUM CHLORIDE 4 MILLILITER(S): 9 INJECTION INTRAMUSCULAR; INTRAVENOUS; SUBCUTANEOUS at 16:09

## 2020-11-28 RX ADMIN — Medication 12.5 MILLIGRAM(S): at 09:39

## 2020-11-28 RX ADMIN — HEPARIN SODIUM 5000 UNIT(S): 5000 INJECTION INTRAVENOUS; SUBCUTANEOUS at 14:03

## 2020-11-28 RX ADMIN — Medication 650 MILLIGRAM(S): at 13:47

## 2020-11-28 RX ADMIN — HYDROMORPHONE HYDROCHLORIDE 30 MILLILITER(S): 2 INJECTION INTRAMUSCULAR; INTRAVENOUS; SUBCUTANEOUS at 18:59

## 2020-11-28 RX ADMIN — SODIUM CHLORIDE 4 MILLILITER(S): 9 INJECTION INTRAMUSCULAR; INTRAVENOUS; SUBCUTANEOUS at 22:48

## 2020-11-28 RX ADMIN — HEPARIN SODIUM 5000 UNIT(S): 5000 INJECTION INTRAVENOUS; SUBCUTANEOUS at 05:30

## 2020-11-28 RX ADMIN — PANTOPRAZOLE SODIUM 40 MILLIGRAM(S): 20 TABLET, DELAYED RELEASE ORAL at 05:31

## 2020-11-28 RX ADMIN — Medication 650 MILLIGRAM(S): at 12:29

## 2020-11-28 RX ADMIN — Medication 650 MILLIGRAM(S): at 18:05

## 2020-11-28 RX ADMIN — HYDROMORPHONE HYDROCHLORIDE 30 MILLILITER(S): 2 INJECTION INTRAMUSCULAR; INTRAVENOUS; SUBCUTANEOUS at 07:09

## 2020-11-28 RX ADMIN — DORNASE ALFA 2.5 MILLIGRAM(S): 1 SOLUTION RESPIRATORY (INHALATION) at 22:49

## 2020-11-28 RX ADMIN — PIPERACILLIN AND TAZOBACTAM 25 GRAM(S): 4; .5 INJECTION, POWDER, LYOPHILIZED, FOR SOLUTION INTRAVENOUS at 01:37

## 2020-11-28 RX ADMIN — SODIUM CHLORIDE 4 MILLILITER(S): 9 INJECTION INTRAMUSCULAR; INTRAVENOUS; SUBCUTANEOUS at 03:22

## 2020-11-28 RX ADMIN — SODIUM CHLORIDE 4 MILLILITER(S): 9 INJECTION INTRAMUSCULAR; INTRAVENOUS; SUBCUTANEOUS at 10:16

## 2020-11-28 RX ADMIN — PIPERACILLIN AND TAZOBACTAM 25 GRAM(S): 4; .5 INJECTION, POWDER, LYOPHILIZED, FOR SOLUTION INTRAVENOUS at 17:32

## 2020-11-28 RX ADMIN — Medication 12.5 MILLIGRAM(S): at 17:32

## 2020-11-28 RX ADMIN — Medication 650 MILLIGRAM(S): at 23:19

## 2020-11-28 RX ADMIN — DORNASE ALFA 2.5 MILLIGRAM(S): 1 SOLUTION RESPIRATORY (INHALATION) at 10:19

## 2020-11-28 RX ADMIN — CHLORHEXIDINE GLUCONATE 1 APPLICATION(S): 213 SOLUTION TOPICAL at 21:02

## 2020-11-28 RX ADMIN — PIPERACILLIN AND TAZOBACTAM 25 GRAM(S): 4; .5 INJECTION, POWDER, LYOPHILIZED, FOR SOLUTION INTRAVENOUS at 10:22

## 2020-11-28 RX ADMIN — Medication 650 MILLIGRAM(S): at 17:32

## 2020-11-28 NOTE — PROGRESS NOTE ADULT - SUBJECTIVE AND OBJECTIVE BOX
Anesthesia Pain Management Service    SUBJECTIVE: Patient is doing well with IV PCA and no significant problems reported.      Pain Scale Score	At rest: __0/10_ 	With Activity: ___ 	[X ] Refer to charted pain scores    THERAPY:    [ ] IV PCA Morphine		[ ] 5 mg/mL	[ ] 1 mg/mL  [X ] IV PCA Hydromorphone	[ ] 5 mg/mL	[X ] 1 mg/mL  [ ] IV PCA Fentanyl		[ ] 50 micrograms/mL    Demand dose __0.2_ lockout __6_ (minutes) Continuous Rate _0__ Total: _4.8__  mg used (in past 24 hours)      MEDICATIONS  (STANDING):  acetaminophen   Tablet .. 650 milliGRAM(s) Oral every 6 hours  acetaminophen  IVPB .. 1000 milliGRAM(s) IV Intermittent once  chlorhexidine 4% Liquid 1 Application(s) Topical at bedtime  dornase samy Solution 2.5 milliGRAM(s) Inhalation every 12 hours  heparin   Injectable 5000 Unit(s) SubCutaneous every 8 hours  HYDROmorphone PCA (1 mG/mL) 30 milliLiter(s) PCA Continuous PCA Continuous  influenza   Vaccine 0.5 milliLiter(s) IntraMuscular once  lactated ringers. 1000 milliLiter(s) (30 mL/Hr) IV Continuous <Continuous>  lidocaine   Patch 1 Patch Transdermal every 24 hours  lidocaine   Patch 1 Patch Transdermal every 24 hours  metoprolol tartrate 25 milliGRAM(s) Oral every 12 hours  pantoprazole  Injectable 40 milliGRAM(s) IV Push every 24 hours  piperacillin/tazobactam IVPB.. 3.375 Gram(s) IV Intermittent every 8 hours  sodium chloride 3%  Inhalation 4 milliLiter(s) Inhalation every 6 hours    MEDICATIONS  (PRN):  HYDROmorphone PCA (1 mG/mL) Rescue Clinician Bolus 0.5 milliGRAM(s) IV Push every 15 minutes PRN for Pain Scale GREATER THAN 6  naloxone Injectable 0.1 milliGRAM(s) IV Push every 3 minutes PRN For ANY of the following changes in patient status:  A. RR LESS THAN 10 breaths per minute, B. Oxygen saturation LESS THAN 90%, C. Sedation score of 6  ondansetron Injectable 4 milliGRAM(s) IV Push every 6 hours PRN Nausea and/or Vomiting      OBJECTIVE:  Patient is sitting up in chair, with CT x2.  Sedation Score:	[ X] Alert	 [ ] Drowsy 	[ ] Arousable	[ ] Asleep	[ ] Unresponsive    Side Effects:	[X ] None	[ ] Nausea	[ ] Vomiting	[ ] Pruritus  		[ ] Other:    Vital Signs Last 24 Hrs  T(C): 36.8 (28 Nov 2020 08:00), Max: 38.3 (27 Nov 2020 18:30)  T(F): 98.2 (28 Nov 2020 08:00), Max: 101 (27 Nov 2020 18:30)  HR: 99 (28 Nov 2020 07:00) (89 - 110)  BP: 159/97 (28 Nov 2020 07:00) (128/87 - 159/97)  BP(mean): 111 (28 Nov 2020 07:00) (89 - 111)  RR: 18 (28 Nov 2020 07:00) (14 - 21)  SpO2: 95% (28 Nov 2020 07:00) (93% - 100%)    ASSESSMENT/ PLAN    Therapy to  be:	[ X] Continue   [ ] Discontinued   [ ] Change to prn Analgesics    Documentation and Verification of current medications:   [X] Done	[ ] Not done, not eligible    Comments: Will continue with IV Dilaudid PCA.  Added po Tylenol and Lidocaine patch to lower back.  Recommend non-opioid adjuvant analgesics to be used when possible and when allowed by primary surgical team.

## 2020-11-28 NOTE — PROGRESS NOTE ADULT - SUBJECTIVE AND OBJECTIVE BOX
Anesthesia Pain Management Service    SUBJECTIVE: Patient is doing well with IV PCA and no significant problems reported.  Patient states he has no pain near chest tube site, but he does have some back pain.    Pain Scale Score	At rest: __0/10_ 	With Activity: ___ 	[X ] Refer to charted pain scores    THERAPY:    [ ] IV PCA Morphine		[ ] 5 mg/mL	[ ] 1 mg/mL  [X ] IV PCA Hydromorphone	[ ] 5 mg/mL	[X ] 1 mg/mL  [ ] IV PCA Fentanyl		[ ] 50 micrograms/mL    Demand dose __0.2_ lockout __6_ (minutes) Continuous Rate _0__ Total: _4.8__  mg used (in past 24 hours)      MEDICATIONS  (STANDING):  acetaminophen   Tablet .. 650 milliGRAM(s) Oral every 6 hours  acetaminophen  IVPB .. 1000 milliGRAM(s) IV Intermittent once  chlorhexidine 4% Liquid 1 Application(s) Topical at bedtime  dornase samy Solution 2.5 milliGRAM(s) Inhalation every 12 hours  heparin   Injectable 5000 Unit(s) SubCutaneous every 8 hours  HYDROmorphone PCA (1 mG/mL) 30 milliLiter(s) PCA Continuous PCA Continuous  influenza   Vaccine 0.5 milliLiter(s) IntraMuscular once  lactated ringers. 1000 milliLiter(s) (30 mL/Hr) IV Continuous <Continuous>  lidocaine   Patch 1 Patch Transdermal every 24 hours  lidocaine   Patch 1 Patch Transdermal every 24 hours  metoprolol tartrate 25 milliGRAM(s) Oral every 12 hours  pantoprazole  Injectable 40 milliGRAM(s) IV Push every 24 hours  piperacillin/tazobactam IVPB.. 3.375 Gram(s) IV Intermittent every 8 hours  sodium chloride 3%  Inhalation 4 milliLiter(s) Inhalation every 6 hours    MEDICATIONS  (PRN):  HYDROmorphone PCA (1 mG/mL) Rescue Clinician Bolus 0.5 milliGRAM(s) IV Push every 15 minutes PRN for Pain Scale GREATER THAN 6  naloxone Injectable 0.1 milliGRAM(s) IV Push every 3 minutes PRN For ANY of the following changes in patient status:  A. RR LESS THAN 10 breaths per minute, B. Oxygen saturation LESS THAN 90%, C. Sedation score of 6  ondansetron Injectable 4 milliGRAM(s) IV Push every 6 hours PRN Nausea and/or Vomiting      OBJECTIVE:  Patient is sitting up in chair, with CT x2.  Sedation Score:	[ X] Alert	 [ ] Drowsy 	[ ] Arousable	[ ] Asleep	[ ] Unresponsive    Side Effects:	[X ] None	[ ] Nausea	[ ] Vomiting	[ ] Pruritus  		[ ] Other:    Vital Signs Last 24 Hrs  T(C): 36.8 (28 Nov 2020 08:00), Max: 38.3 (27 Nov 2020 18:30)  T(F): 98.2 (28 Nov 2020 08:00), Max: 101 (27 Nov 2020 18:30)  HR: 99 (28 Nov 2020 07:00) (89 - 110)  BP: 159/97 (28 Nov 2020 07:00) (128/87 - 159/97)  BP(mean): 111 (28 Nov 2020 07:00) (89 - 111)  RR: 18 (28 Nov 2020 07:00) (14 - 21)  SpO2: 95% (28 Nov 2020 07:00) (93% - 100%)    ASSESSMENT/ PLAN    Therapy to  be:	[ X] Continue   [ ] Discontinued   [ ] Change to prn Analgesics    Documentation and Verification of current medications:   [X] Done	[ ] Not done, not elligible    Comments: Will continue with IV Dilaudid PCA.  Added po Tylenol and Lidocaine patch to lower back.  Recommend non-opioid adjuvant analgesics to be used when possible and when allowed by primary surgical team.

## 2020-11-28 NOTE — PROGRESS NOTE ADULT - SUBJECTIVE AND OBJECTIVE BOX
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 Patient had multiple bronchoscopies and ablations in the past.  Most recently he had a consolidation in the right lower lobe and the patient had a previous electrocautery ablation of right lower lobe endobronchial obstructive lesions. On 20, he was s/p Flex Bronch, biopsy, electrofulguration of right lower lobe bronchial stenosis.  Patient presented to ER tonight with complaints of hemoptysis which started since yesterday.  As per ER patient had emesis bag with about 150cc of bright red blood.  Patient was hypoxic, 83% on room air and complaining of difficulty breathing.  Patient was intubated in ER, admitted to [facility] for further management.       Procedure:   Flexible bronchoscopy, right thoracotomy, RLLobectomy, post-operative bronchscopy. 28F R angle diaphragmatic CT, 28F straight posterior CT.    Issues:  Empyema  Sepsis  Postop pain  Hemoptysis  HLD  DEISY                Home Medications:  Edarbi 40 mg oral tablet: 1 tab(s) orally once a day (2020 12:04)      PAST MEDICAL & SURGICAL HISTORY:  Psoriasis    Hyperlipidemia    Obesity    VIDHI on CPAP    HTN (hypertension)    S/P bronchoscopy  3/2019,2019, 10/2019          Vital Signs Last 24 Hrs  T(C): 36.8 (2020 08:00), Max: 38.3 (2020 18:30)  T(F): 98.2 (2020 08:00), Max: 101 (2020 18:30)  HR: 99 (2020 07:00) (89 - 110)  BP: 159/97 (2020 07:00) (128/87 - 159/97)  BP(mean): 111 (2020 07:00) (89 - 111)  RR: 18 (2020 07:00) (14 - 21)  SpO2: 95% (2020 07:00) (93% - 100%)  I&O's Detail    2020 07:01  -  2020 07:00  --------------------------------------------------------  IN:    IV PiggyBack: 100 mL    Lactated Ringers: 270 mL    sodium chloride 0.45%: 300 mL  Total IN: 670 mL    OUT:    Chest Tube (mL): 30 mL    Chest Tube (mL): 30 mL    Voided (mL): 1600 mL  Total OUT: 1660 mL    Total NET: -990 mL        CAPILLARY BLOOD GLUCOSE        Home Medications:  Edarbi 40 mg oral tablet: 1 tab(s) orally once a day (2020 12:04)      MEDICATIONS  (STANDING):  acetaminophen   Tablet .. 650 milliGRAM(s) Oral every 6 hours  acetaminophen  IVPB .. 1000 milliGRAM(s) IV Intermittent once  chlorhexidine 4% Liquid 1 Application(s) Topical at bedtime  dornase samy Solution 2.5 milliGRAM(s) Inhalation every 12 hours  heparin   Injectable 5000 Unit(s) SubCutaneous every 8 hours  HYDROmorphone PCA (1 mG/mL) 30 milliLiter(s) PCA Continuous PCA Continuous  influenza   Vaccine 0.5 milliLiter(s) IntraMuscular once  lactated ringers. 1000 milliLiter(s) (30 mL/Hr) IV Continuous <Continuous>  lidocaine   Patch 1 Patch Transdermal every 24 hours  lidocaine   Patch 1 Patch Transdermal every 24 hours  metoprolol tartrate 12.5 milliGRAM(s) Oral every 12 hours  pantoprazole  Injectable 40 milliGRAM(s) IV Push every 24 hours  piperacillin/tazobactam IVPB.. 3.375 Gram(s) IV Intermittent every 8 hours  sodium chloride 3%  Inhalation 4 milliLiter(s) Inhalation every 6 hours    MEDICATIONS  (PRN):  HYDROmorphone PCA (1 mG/mL) Rescue Clinician Bolus 0.5 milliGRAM(s) IV Push every 15 minutes PRN for Pain Scale GREATER THAN 6  naloxone Injectable 0.1 milliGRAM(s) IV Push every 3 minutes PRN For ANY of the following changes in patient status:  A. RR LESS THAN 10 breaths per minute, B. Oxygen saturation LESS THAN 90%, C. Sedation score of 6  ondansetron Injectable 4 milliGRAM(s) IV Push every 6 hours PRN Nausea and/or Vomiting        Physical exam:               General:               Pt is awake , alert not in any distress                                                 Neuro:                 Non-focal                            Cardiovascular:     S1 & S2, regular                          Respiratory:         Air entry is fair and equal on both sides, has bilateral conducted sounds                           GI:                       Soft, nondistended and nontender, Bowel sounds active                            Ext:                      No cyanosis or edema                              Labs:                                                                           12.0   13.40 )-----------( 230      ( 2020 04:00 )             37.6             -    136  |  102  |  10  ----------------------------<  126<H>  3.9   |  22  |  0.76    Ca    9.3      2020 04:00  Phos  3.0       Mg     2.1                         Culture - Fungal, Tissue (collected 2020 17:01)  Source: .Tissue Right Lower Lobe  Preliminary Report (2020 07:59):    Testing in progress    Culture - Tissue with Gram Stain (collected 2020 16:58)  Source: .Tissue Right Lower Lobe  Gram Stain (2020 21:57):    Moderate polymorphonuclear leukocytes per low power field    No organisms seen  Preliminary Report (2020 18:53):    Rare Klebsiella pneumoniae  Organism: Klebsiella pneumoniae (2020 16:43)  Organism: Klebsiella pneumoniae (2020 16:43)    Culture - Fungal, Body Fluid (collected 2020 16:48)  Source: .Body Fluid #1 (R) PLEURAL  Preliminary Report (2020 07:59):    Testing in progress    Culture - Body Fluid with Gram Stain (collected 2020 16:48)  Source: .Body Fluid #1 (R) PLEURAL  Gram Stain (2020 20:29):    polymorphonuclear leukocytes seen    No organisms seen    by cytocentrifuge  Preliminary Report (2020 09:45):    Few Klebsiella pneumoniae  Organism: Klebsiella pneumoniae (2020 09:44)  Organism: Klebsiella pneumoniae (2020 09:44)    Culture - Acid Fast - Body Fluid w/Smear (collected 2020 16:48)  Source: .Body Fluid #1 (R) PLEURAL    Culture - Blood (collected 2020 12:12)  Source: .Blood Blood  Preliminary Report (2020 13:01):    No growth to date.      CXR:    < from: Xray Chest 1 View- PORTABLE-Routine (. @ 05:34) >  Endotracheal tube not well visualized, correlate with extubation.  There are 2 Right-sided chest tubes in unchanged positioning.  Right IJ approach central venous catheter with tip at the cavoatrial junction.  Surgical staples overlying the right axilla.  The heart is normal in size.  Small right basilar pleural effusion and or atelectasis. Left lung is clear.  No pneumothorax.    IMPRESSION:  Lines and tubes as above.Small right basilar pleural effusion and or atelectasis. Left lung is clear.      Plan:    General:   45yMale s/p Flexible bronchoscopy, right thoracotomy, RL Lobectomy, post-operative bronchoscopy 28F R angle diaphragmatic CT, 28F straight posterior CT. , experiencing  pain with deep breathing.                             Neuro:                                         Pain control with Dilaudid PCA /  Tylenol PRN                            Cardiovascular:                                           Septic shock: Resolved     HTN: Start Lopressor 12.5mg [id number] and increase as tolerated                            Respiratory:                                         Pt is on 2L NC, wean off as tolerated     Comfortable, not in any distress.                                         Monitor chest tube output                                         Chest tube to suction                                                                  Continue bronchodilators, pulmonary toilet     Hemoptysis resolved after lobectomy     Empyema: Continue Zosyn.                             GI                                         Continue Zofran / Reglan for nausea - PRN     Protonix for GI prophylaxis	                                                                 Renal:                                         DEISY: Resolved.                                          Monitor I/Os and electrolytes                                                                                        Hem/ Onc:                                                                                  Monitor chest tube output &  signs of bleeding.                                          Follow CBC in AM                           Infectious disease:     Empyema: Continue Zosyn.      I.D eval                                         Monitor for fever / leukocytosis.                                         All surgical incision / chest tube  sites look clean                            Endocrine                                           Continue Accu-Checks with coverage    Pt is on SQ Heparin and Venodyne boots for DVT prophylaxis.     Pertinent clinical, laboratory, radiographic, hemodynamic, echocardiographic, respiratory data, microbiologic data and chart were reviewed and analyzed frequently throughout the course of the day and night  Patient seen, examined and plan discussed with CT Surgeon Dr. King  / CTICU team during rounds.          Mak Mcleod MD

## 2020-11-29 ENCOUNTER — TRANSCRIPTION ENCOUNTER (OUTPATIENT)
Age: 45
End: 2020-11-29

## 2020-11-29 LAB
ALBUMIN SERPL ELPH-MCNC: 3.3 G/DL — SIGNIFICANT CHANGE UP (ref 3.3–5)
ALP SERPL-CCNC: 59 U/L — SIGNIFICANT CHANGE UP (ref 40–120)
ALT FLD-CCNC: 18 U/L — SIGNIFICANT CHANGE UP (ref 4–41)
ANION GAP SERPL CALC-SCNC: 10 MMO/L — SIGNIFICANT CHANGE UP (ref 7–14)
AST SERPL-CCNC: 20 U/L — SIGNIFICANT CHANGE UP (ref 4–40)
BILIRUB SERPL-MCNC: 0.6 MG/DL — SIGNIFICANT CHANGE UP (ref 0.2–1.2)
BUN SERPL-MCNC: 12 MG/DL — SIGNIFICANT CHANGE UP (ref 7–23)
CALCIUM SERPL-MCNC: 9.2 MG/DL — SIGNIFICANT CHANGE UP (ref 8.4–10.5)
CHLORIDE SERPL-SCNC: 99 MMOL/L — SIGNIFICANT CHANGE UP (ref 98–107)
CO2 SERPL-SCNC: 25 MMOL/L — SIGNIFICANT CHANGE UP (ref 22–31)
CREAT SERPL-MCNC: 0.84 MG/DL — SIGNIFICANT CHANGE UP (ref 0.5–1.3)
GLUCOSE SERPL-MCNC: 144 MG/DL — HIGH (ref 70–99)
HCT VFR BLD CALC: 38.3 % — LOW (ref 39–50)
HGB BLD-MCNC: 12.4 G/DL — LOW (ref 13–17)
MAGNESIUM SERPL-MCNC: 2.1 MG/DL — SIGNIFICANT CHANGE UP (ref 1.6–2.6)
MCHC RBC-ENTMCNC: 29.1 PG — SIGNIFICANT CHANGE UP (ref 27–34)
MCHC RBC-ENTMCNC: 32.4 % — SIGNIFICANT CHANGE UP (ref 32–36)
MCV RBC AUTO: 89.9 FL — SIGNIFICANT CHANGE UP (ref 80–100)
NRBC # FLD: 0 K/UL — SIGNIFICANT CHANGE UP (ref 0–0)
PHOSPHATE SERPL-MCNC: 3.2 MG/DL — SIGNIFICANT CHANGE UP (ref 2.5–4.5)
PLATELET # BLD AUTO: 237 K/UL — SIGNIFICANT CHANGE UP (ref 150–400)
PMV BLD: 9.5 FL — SIGNIFICANT CHANGE UP (ref 7–13)
POTASSIUM SERPL-MCNC: 4.1 MMOL/L — SIGNIFICANT CHANGE UP (ref 3.5–5.3)
POTASSIUM SERPL-SCNC: 4.1 MMOL/L — SIGNIFICANT CHANGE UP (ref 3.5–5.3)
PROT SERPL-MCNC: 7 G/DL — SIGNIFICANT CHANGE UP (ref 6–8.3)
RBC # BLD: 4.26 M/UL — SIGNIFICANT CHANGE UP (ref 4.2–5.8)
RBC # FLD: 12.8 % — SIGNIFICANT CHANGE UP (ref 10.3–14.5)
SODIUM SERPL-SCNC: 134 MMOL/L — LOW (ref 135–145)
WBC # BLD: 10.69 K/UL — HIGH (ref 3.8–10.5)
WBC # FLD AUTO: 10.69 K/UL — HIGH (ref 3.8–10.5)

## 2020-11-29 PROCEDURE — 71045 X-RAY EXAM CHEST 1 VIEW: CPT | Mod: 26

## 2020-11-29 PROCEDURE — 99233 SBSQ HOSP IP/OBS HIGH 50: CPT

## 2020-11-29 PROCEDURE — 71045 X-RAY EXAM CHEST 1 VIEW: CPT | Mod: 26,77

## 2020-11-29 RX ORDER — SODIUM CHLORIDE 9 MG/ML
1000 INJECTION INTRAMUSCULAR; INTRAVENOUS; SUBCUTANEOUS
Refills: 0 | Status: DISCONTINUED | OUTPATIENT
Start: 2020-11-29 | End: 2020-11-30

## 2020-11-29 RX ADMIN — Medication 650 MILLIGRAM(S): at 01:59

## 2020-11-29 RX ADMIN — HEPARIN SODIUM 5000 UNIT(S): 5000 INJECTION INTRAVENOUS; SUBCUTANEOUS at 05:17

## 2020-11-29 RX ADMIN — HYDROMORPHONE HYDROCHLORIDE 30 MILLILITER(S): 2 INJECTION INTRAMUSCULAR; INTRAVENOUS; SUBCUTANEOUS at 05:51

## 2020-11-29 RX ADMIN — LIDOCAINE 1 PATCH: 4 CREAM TOPICAL at 22:00

## 2020-11-29 RX ADMIN — PIPERACILLIN AND TAZOBACTAM 25 GRAM(S): 4; .5 INJECTION, POWDER, LYOPHILIZED, FOR SOLUTION INTRAVENOUS at 03:00

## 2020-11-29 RX ADMIN — HEPARIN SODIUM 5000 UNIT(S): 5000 INJECTION INTRAVENOUS; SUBCUTANEOUS at 23:22

## 2020-11-29 RX ADMIN — SODIUM CHLORIDE 4 MILLILITER(S): 9 INJECTION INTRAMUSCULAR; INTRAVENOUS; SUBCUTANEOUS at 03:11

## 2020-11-29 RX ADMIN — PANTOPRAZOLE SODIUM 40 MILLIGRAM(S): 20 TABLET, DELAYED RELEASE ORAL at 05:17

## 2020-11-29 RX ADMIN — HEPARIN SODIUM 5000 UNIT(S): 5000 INJECTION INTRAVENOUS; SUBCUTANEOUS at 13:27

## 2020-11-29 RX ADMIN — HYDROMORPHONE HYDROCHLORIDE 30 MILLILITER(S): 2 INJECTION INTRAMUSCULAR; INTRAVENOUS; SUBCUTANEOUS at 11:10

## 2020-11-29 RX ADMIN — Medication 650 MILLIGRAM(S): at 13:27

## 2020-11-29 RX ADMIN — Medication 650 MILLIGRAM(S): at 23:22

## 2020-11-29 RX ADMIN — SODIUM CHLORIDE 30 MILLILITER(S): 9 INJECTION INTRAMUSCULAR; INTRAVENOUS; SUBCUTANEOUS at 09:58

## 2020-11-29 RX ADMIN — SODIUM CHLORIDE 4 MILLILITER(S): 9 INJECTION INTRAMUSCULAR; INTRAVENOUS; SUBCUTANEOUS at 10:20

## 2020-11-29 RX ADMIN — Medication 650 MILLIGRAM(S): at 06:00

## 2020-11-29 RX ADMIN — LIDOCAINE 1 PATCH: 4 CREAM TOPICAL at 05:44

## 2020-11-29 RX ADMIN — DORNASE ALFA 2.5 MILLIGRAM(S): 1 SOLUTION RESPIRATORY (INHALATION) at 10:20

## 2020-11-29 RX ADMIN — Medication 12.5 MILLIGRAM(S): at 17:17

## 2020-11-29 RX ADMIN — Medication 12.5 MILLIGRAM(S): at 05:17

## 2020-11-29 RX ADMIN — Medication 650 MILLIGRAM(S): at 05:19

## 2020-11-29 RX ADMIN — Medication 650 MILLIGRAM(S): at 17:48

## 2020-11-29 RX ADMIN — Medication 650 MILLIGRAM(S): at 17:18

## 2020-11-29 RX ADMIN — HYDROMORPHONE HYDROCHLORIDE 30 MILLILITER(S): 2 INJECTION INTRAMUSCULAR; INTRAVENOUS; SUBCUTANEOUS at 19:15

## 2020-11-29 RX ADMIN — PIPERACILLIN AND TAZOBACTAM 25 GRAM(S): 4; .5 INJECTION, POWDER, LYOPHILIZED, FOR SOLUTION INTRAVENOUS at 17:18

## 2020-11-29 RX ADMIN — LIDOCAINE 1 PATCH: 4 CREAM TOPICAL at 08:00

## 2020-11-29 RX ADMIN — Medication 650 MILLIGRAM(S): at 00:00

## 2020-11-29 RX ADMIN — PIPERACILLIN AND TAZOBACTAM 25 GRAM(S): 4; .5 INJECTION, POWDER, LYOPHILIZED, FOR SOLUTION INTRAVENOUS at 09:58

## 2020-11-29 NOTE — CONSULT NOTE ADULT - ASSESSMENT
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 Patient had multiple bronchoscopies and ablations in the past.  Most recently he had a consolidation in the right lower lobe and the patient had a previous electrocautery ablation of right lower lobe endobronchial obstructive lesions. On 11/16/20, he was s/p Flex Bronch, biopsy, electrofulguration of right lower lobe bronchial stenosis.  Patient presented to ER tonight with complaints of hemoptysis which started since yesterday.  As per ER patient had emesis bag with about 150cc of bright red blood.  Patient was hypoxic, 83% on room air and complaining of difficulty breathing. Patient was intubated in ER, admitted to [facility] for further management.   (25 Nov 2020 04:39)    HOSP COURSE:    11/25 - Flex bronch with BAL and aspiration of blood clots, rt thoracotomy, RLL lobectomy  Pt maintained on ventilator and pressors, managed for septic shock and acute hypoxic respiratory failure in CT ICU.  Now extubated and transferred to surgical floor.  Cultures growing Klebsiella.  ID consulted for further abx managment.    Necrotizing pna:    - Pt with empyema and necrotizing pna, s/p RLL lobectomy.  Pt a/w severe sepsis and shock with acute hypoxic respiratory failure, s/p intubation and pressor support.    - Intra-op pleural fluid and tiss cx (+) Klebsiella pna.  Cont zosyn for now.  Recommend IV abx for now.     - Anticipate eventual switch to oral therapy.  Recommend extended course of PO abx    - f/u bcx, all intra-op culture data.  f/u pathology.      Will follow,    Kati Castro  [phone]

## 2020-11-29 NOTE — CONSULT NOTE ADULT - SUBJECTIVE AND OBJECTIVE BOX
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 Patient had multiple bronchoscopies and ablations in the past.  Most recently he had a consolidation in the right lower lobe and the patient had a previous electrocautery ablation of right lower lobe endobronchial obstructive lesions. On 11/16/20, he was s/p Flex Bronch, biopsy, electrofulguration of right lower lobe bronchial stenosis.  Patient presented to ER tonight with complaints of hemoptysis which started since yesterday.  As per ER patient had emesis bag with about 150cc of bright red blood.  Patient was hypoxic, 83% on room air and complaining of difficulty breathing. Patient was intubated in ER, admitted to [facility] for further management.   (25 Nov 2020 04:39)    HOSP COURSE:    11/25 - Flex bronch with BAL and aspiration of blood clots, rt thoracotomy, RLL lobectomy  Pt maintained on ventilator and pressors, managed for septic shock and acute hypoxic respiratory failure in CT ICU.  Now extubated and transferred to surgical floor.  Cultures growing Klebsiella.  ID consulted for further abx managment.         REVIEW OF SYSTEMS:    CONSTITUTIONAL: No fever, weight loss, or fatigue  EYES: No eye pain, visual disturbances, or discharge  ENMT:  No sore throat  NECK: No pain or stiffness  RESPIRATORY: No cough, wheezing, chills or hemoptysis; No shortness of breath  CARDIOVASCULAR: No chest pain, palpitations, dizziness, or leg swelling  GASTROINTESTINAL: No abdominal or epigastric pain. No nausea, vomiting, or hematemesis; No diarrhea or constipation. No melena or hematochezia.  GENITOURINARY: No dysuria, frequency, hematuria, or incontinence  NEUROLOGICAL: No headaches, memory loss, loss of strength, numbness, or tremors  SKIN: No itching, burning, rashes, or lesions   LYMPH NODES: No enlarged glands  MUSCULOSKELETAL: No joint pain or swelling; No muscle, back, or extremity pain      PAST MEDICAL & SURGICAL HISTORY:  Psoriasis    Hyperlipidemia    Obesity    VDIHI on CPAP    HTN (hypertension)    S/P bronchoscopy  3/2019,8/2019, 10/2019        Allergies    No Known Allergies    Intolerances        FAMILY HISTORY:  FH: HTN (hypertension) (Father)        SOCIAL HISTORY:    H/o smoking    MEDICATIONS  (STANDING):  acetaminophen   Tablet .. 650 milliGRAM(s) Oral every 6 hours  acetaminophen  IVPB .. 1000 milliGRAM(s) IV Intermittent once  chlorhexidine 4% Liquid 1 Application(s) Topical at bedtime  dornase samy Solution 2.5 milliGRAM(s) Inhalation every 12 hours  heparin   Injectable 5000 Unit(s) SubCutaneous every 8 hours  HYDROmorphone PCA (1 mG/mL) 30 milliLiter(s) PCA Continuous PCA Continuous  lidocaine   Patch 1 Patch Transdermal every 24 hours  metoprolol tartrate 12.5 milliGRAM(s) Oral every 12 hours  piperacillin/tazobactam IVPB.. 3.375 Gram(s) IV Intermittent every 8 hours  sodium chloride 0.9%. 1000 milliLiter(s) (30 mL/Hr) IV Continuous <Continuous>    MEDICATIONS  (PRN):  HYDROmorphone PCA (1 mG/mL) Rescue Clinician Bolus 0.5 milliGRAM(s) IV Push every 15 minutes PRN for Pain Scale GREATER THAN 6  naloxone Injectable 0.1 milliGRAM(s) IV Push every 3 minutes PRN For ANY of the following changes in patient status:  A. RR LESS THAN 10 breaths per minute, B. Oxygen saturation LESS THAN 90%, C. Sedation score of 6  ondansetron Injectable 4 milliGRAM(s) IV Push every 6 hours PRN Nausea and/or Vomiting      Vital Signs Last 24 Hrs  T(C): 37.6 (29 Nov 2020 16:08), Max: 37.7 (29 Nov 2020 04:00)  T(F): 99.7 (29 Nov 2020 16:08), Max: 99.8 (29 Nov 2020 04:00)  HR: 106 (29 Nov 2020 16:08) (74 - 106)  BP: 154/82 (29 Nov 2020 16:08) (108/69 - 154/82)  BP(mean): 82 (29 Nov 2020 10:00) (78 - 100)  RR: 18 (29 Nov 2020 16:08) (16 - 23)  SpO2: 95% (29 Nov 2020 16:08) (92% - 100%)    PHYSICAL EXAM:    GENERAL: NAD, well-groomed  HEAD:  Atraumatic, Normocephalic  EYES: EOMI, PERRLA, conjunctiva and sclera clear  ENMT: No tonsillar erythema, exudates, or enlargement; Moist mucous membranes  NECK: Supple, No JVD  CHEST/LUNG: rt sided chest tube.  Surg site dsg c/d/i  HEART: Regular rate and rhythm; No murmurs, rubs, or gallops  ABDOMEN: Soft, Nontender, Nondistended; Bowel sounds present  EXTREMITIES:  2+ Peripheral Pulses, No clubbing, cyanosis, or edema  LYMPH: No lymphadenopathy noted  SKIN: No rashes or lesions    LABS:  CBC Full  -  ( 29 Nov 2020 04:15 )  WBC Count : 10.69 K/uL  RBC Count : 4.26 M/uL  Hemoglobin : 12.4 g/dL  Hematocrit : 38.3 %  Platelet Count - Automated : 237 K/uL  Mean Cell Volume : 89.9 fL  Mean Cell Hemoglobin : 29.1 pg  Mean Cell Hemoglobin Concentration : 32.4 %  Auto Neutrophil # : x  Auto Lymphocyte # : x  Auto Monocyte # : x  Auto Eosinophil # : x  Auto Basophil # : x  Auto Neutrophil % : x  Auto Lymphocyte % : x  Auto Monocyte % : x  Auto Eosinophil % : x  Auto Basophil % : x      11-29    134<L>  |  99  |  12  ----------------------------<  144<H>  4.1   |  25  |  0.84    Ca    9.2      29 Nov 2020 04:15  Phos  3.2     11-29  Mg     2.1     11-29    TPro  7.0  /  Alb  3.3  /  TBili  0.6  /  DBili  x   /  AST  20  /  ALT  18  /  AlkPhos  59  11-29      LIVER FUNCTIONS - ( 29 Nov 2020 04:15 )  Alb: 3.3 g/dL / Pro: 7.0 g/dL / ALK PHOS: 59 u/L / ALT: 18 u/L / AST: 20 u/L / GGT: x                               MICROBIOLOGY:    Culture - Fungal, Tissue (11.25.20 @ 17:01)   Specimen Source: .Tissue Right Lower Lobe   Culture Results:   Testing in progress       Culture - Tissue with Gram Stain (11.25.20 @ 16:58)   - Levofloxacin: S <=0.5   - Meropenem: S <=1   - Piperacillin/Tazobactam: S <=8   - Tobramycin: S <=2   - Trimethoprim/Sulfamethoxazole: S <=0.5/9.5   Gram Stain:   Moderate polymorphonuclear leukocytes per low power field   No organisms seen   - Amikacin: S <=16   - Amoxicillin/Clavulanic Acid: S <=8/4   - Ampicillin: R >16 These ampicillin results predict results for amoxicillin   - Ampicillin/Sulbactam: S <=4/2 Enterobacter, Citrobacter, and Serratia may develop resistance during prolonged therapy (3-4 days)   - Aztreonam: S <=4   - Cefazolin: S <=2 Enterobacter, Citrobacter, and Serratia may develop resistance during prolonged therapy (3-4 days)   - Cefepime: S <=2   - Cefoxitin: S <=8   - Ceftriaxone: S <=1 Enterobacter, Citrobacter, and Serratia may develop resistance during prolonged therapy   - Ciprofloxacin: S <=0.25   - Ertapenem: S <=0.5   - Gentamicin: S <=2   - Imipenem: S <=1   Specimen Source: .Tissue Right Lower Lobe   Culture Results:   Rare Klebsiella pneumoniae   Organism Identification: Klebsiella pneumoniae   Organism: Klebsiella pneumoniae   Method Type: JENNIFER     Culture - Fungal, Body Fluid (11.25.20 @ 16:48)   Specimen Source: .Body Fluid #1 (R) PLEURAL   Culture Results:   Testing in progress       Culture - Body Fluid with Gram Stain (11.25.20 @ 16:48)   - Gentamicin: S <=2   - Levofloxacin: S <=0.5   - Piperacillin/Tazobactam: S <=8   - Trimethoprim/Sulfamethoxazole: S <=0.5/9.5   - Tobramycin: S <=2   - Meropenem: S <=1   - Amikacin: S <=16   Gram Stain:   polymorphonuclear leukocytes seen   No organisms seen   by cytocentrifuge   - Imipenem: S <=1   - Ertapenem: S <=0.5   - Ceftriaxone: S <=1 Enterobacter, Citrobacter, and Serratia may develop resistance during prolonged therapy   - Ampicillin/Sulbactam: S 8/4 Enterobacter, Citrobacter, and Serratia may develop resistance during prolonged therapy (3-4 days)   - Cefazolin: S <=2 Enterobacter, Citrobacter, and Serratia may develop resistance during prolonged therapy (3-4 days)   - Cefoxitin: S <=8   - Ciprofloxacin: S <=0.25   - Cefepime: S <=2   - Aztreonam: S <=4   - Ampicillin: R >16 These ampicillin results predict results for amoxicillin   - Amoxicillin/Clavulanic Acid: S <=8/4   Specimen Source: .Body Fluid #1 (R) PLEURAL   Culture Results:   Few Klebsiella pneumoniae   Organism Identification: Klebsiella pneumoniae   Organism: Klebsiella pneumoniae   Method Type: JENNIFER       Culture - Acid Fast - Body Fluid w/Smear (11.25.20 @ 16:48)   Specimen Source: .Body Fluid #1 (R) PLEURAL   Acid Fast Bacilli Smear:   No acid fast bacilli seen by fluorochrome stain   Culture Results:   Culture is being performed.       Culture - Blood (11.25.20 @ 12:12)   Specimen Source: .Blood Blood   Culture Results:   No growth to date.         RADIOLOGY:    < from: Xray Chest 1 View- PORTABLE-Urgent (Xray Chest 1 View- PORTABLE-Urgent .) (11.29.20 @ 10:40) >    EXAM:  XR CHEST PORTABLE URGENT 1V        PROCEDURE DATE:  Nov 29 2020         INTERPRETATION:  HISTORY: Chest tube removal    TECHNIQUE: A single portable view of the chest was obtained.    COMPARISON: 11/29/2020 5:00 AM    FINDINGS:  The cardiac silhouette is normal in size. A right basilar chest tube is in place. The previously noted second right-sided chest tube has been removed. No pneumothorax is seen. There is a small right pleural effusion. There are no focal consolidations.  The hilar and mediastinal structures appear unremarkable. Surgical clips overlie the left thorax. There are surgical staples in the right chest wall. There is a mildly displaced right rib fracture.    IMPRESSION: No pneumothorax seen status post removal of right-sided chest tube. Small right pleural effusion.    < end of copied text >        < from: Xray Chest 1 View- PORTABLE-Routine (11.28.20 @ 05:08) >    EXAM:  XR CHEST PORTABLE ROUTINE 1V        PROCEDURE DATE:  Nov 28 2020         INTERPRETATION:  DATE OF STUDY: 11/28/20    PRIOR:11/27/20    CLINICAL INDICATION: Chest tubes.    TECHNIQUE: portable chest.    FINDINGS/  IMPRESSION:  2 right-sided chest tubes are unchanged in position.  Right IJ venous catheter has been removed.  Surgical staples overlie the right axilla.  The heart is magnified by technique.  Stable small right basilar pleural effusion and associated atelectasis.  The left lungremains clear.  No pneumothorax.    < end of copied text >      < from: CT Angio Chest w/ IV Cont (11.25.20 @ 05:39) >    EXAM:  CT ANGIO CHEST (W)AW IC        PROCEDURE DATE:  Nov 25 2020         INTERPRETATION:  CLINICAL INFORMATION: Hemoptysis. Evaluate for pulmonary embolus    COMPARISON: Outside hospital CT chest 11/7/2020.    PROCEDURE:  CT Angiography of the Chest.  90 ml of Omnipaque 350 was injected intravenously. 10 ml were discarded.  Sagittal and coronal reformats were performed as well as 3D (MIP) reconstructions.    FINDINGS:    LUNGS AND AIRWAYS: Endotracheal tube with tip above the sandi. Since 11/7/2020, new heterogenous consolidation of the right lower lobe. There is also new atelectasis of the right upper lobe and left lower lobe.    PLEURA: Small right pleural effusion. No pneumothorax.    MEDIASTINUM AND ALFRED: No chest lymph nodes measuregreater than 10 mm in the short axis.    VESSELS: No main, right and left main, or lobar pulmonary embolus. Limited evaluation of the segmental and subsegmental pulmonary arteries.    Left-sided aortic arch and left-sided descending thoracic aorta. The aorta is normal.    HEART: Heart size is normal. No pericardial effusion.    CHEST WALL AND LOWER NECK: Within normal limits.    VISUALIZED UPPER ABDOMEN: The liver is likely fatty infiltrated. Biliary sludge.    BONES: Within normal limits.    IMPRESSION:    1.  No central pulmonary embolus. Limited evaluation of the segmental and subsegmental pulmonary arteries.  2.  The right lower lobe consolidation is secondary to a combination of pneumonia and atelectasis.    < end of copied text >

## 2020-11-29 NOTE — DISCHARGE NOTE PROVIDER - NSDCCPCAREPLAN_GEN_ALL_CORE_FT
PRINCIPAL DISCHARGE DIAGNOSIS  Diagnosis: Massive hemoptysis  Assessment and Plan of Treatment:

## 2020-11-29 NOTE — DISCHARGE NOTE PROVIDER - NSDCFUADDAPPT_GEN_ALL_CORE_FT
Please follow up with your PCP in 2 weeks. See Dr. Amato in 7-10 days. Call to make an apt. [phone]  Have a chest xray prior to that appointment.   See Dr. Castro Infectious Disease in 3-4 weeks towards end of Antibiotic course. Call to make your apt.

## 2020-11-29 NOTE — DISCHARGE NOTE PROVIDER - CARE PROVIDER_API CALL
Michael Amato  SURGERY  [address] [place] [address], [place] [address]  Phone: [phone]  Fax: [phone]  Follow Up Time:     Kati Castro  INFECTIOUS DISEASE  [address] [facility], [facility] [address]  [place] [address]  Phone: [phone]  Fax: [phone]  Follow Up Time:

## 2020-11-29 NOTE — DISCHARGE NOTE PROVIDER - NSDCCPTREATMENT_GEN_ALL_CORE_FT
PRINCIPAL PROCEDURE  Procedure: Bronchoscopy, with lobectomy by thoracotomy  Findings and Treatment:

## 2020-11-29 NOTE — PROGRESS NOTE ADULT - SUBJECTIVE AND OBJECTIVE BOX
Anesthesia Pain Management Service    SUBJECTIVE: Patient is doing well with IV PCA and no significant problems reported.  Patient states he feels better than yesterday.      Pain Scale Score	At rest: __2/10_ 	With Activity: ___ 	[X ] Refer to charted pain scores    THERAPY:    [ ] IV PCA Morphine		[ ] 5 mg/mL	[ ] 1 mg/mL  [X ] IV PCA Hydromorphone	[ ] 5 mg/mL	[X ] 1 mg/mL  [ ] IV PCA Fentanyl		[ ] 50 micrograms/mL    Demand dose __0.2_ lockout __6_ (minutes) Continuous Rate _0__ Total: __3.4_  mg used (in past 24 hours)      MEDICATIONS  (STANDING):  acetaminophen   Tablet .. 650 milliGRAM(s) Oral every 6 hours  acetaminophen  IVPB .. 1000 milliGRAM(s) IV Intermittent once  chlorhexidine 4% Liquid 1 Application(s) Topical at bedtime  dornase samy Solution 2.5 milliGRAM(s) Inhalation every 12 hours  heparin   Injectable 5000 Unit(s) SubCutaneous every 8 hours  HYDROmorphone PCA (1 mG/mL) 30 milliLiter(s) PCA Continuous PCA Continuous  lidocaine   Patch 1 Patch Transdermal every 24 hours  metoprolol tartrate 12.5 milliGRAM(s) Oral every 12 hours  piperacillin/tazobactam IVPB.. 3.375 Gram(s) IV Intermittent every 8 hours  sodium chloride 0.9%. 1000 milliLiter(s) (30 mL/Hr) IV Continuous <Continuous>    MEDICATIONS  (PRN):  HYDROmorphone PCA (1 mG/mL) Rescue Clinician Bolus 0.5 milliGRAM(s) IV Push every 15 minutes PRN for Pain Scale GREATER THAN 6  naloxone Injectable 0.1 milliGRAM(s) IV Push every 3 minutes PRN For ANY of the following changes in patient status:  A. RR LESS THAN 10 breaths per minute, B. Oxygen saturation LESS THAN 90%, C. Sedation score of 6  ondansetron Injectable 4 milliGRAM(s) IV Push every 6 hours PRN Nausea and/or Vomiting      OBJECTIVE:  Patient is sitting up in chair with CT.    Sedation Score:	[ X] Alert	 [ ] Drowsy 	[ ] Arousable	[ ] Asleep	[ ] Unresponsive    Side Effects:	[X ] None	[ ] Nausea	[ ] Vomiting	[ ] Pruritus  		[ ] Other:    Vital Signs Last 24 Hrs  T(C): 37.1 (29 Nov 2020 08:00), Max: 37.7 (29 Nov 2020 04:00)  T(F): 98.7 (29 Nov 2020 08:00), Max: 99.8 (29 Nov 2020 04:00)  HR: 80 (29 Nov 2020 10:27) (74 - 101)  BP: 128/67 (29 Nov 2020 10:00) (108/69 - 147/88)  BP(mean): 82 (29 Nov 2020 10:00) (78 - 100)  RR: 21 (29 Nov 2020 10:00) (16 - 23)  SpO2: 97% (29 Nov 2020 10:27) (92% - 100%)    ASSESSMENT/ PLAN    Therapy to  be:	[ X] Continue   [ ] Discontinued   [ ] Change to prn Analgesics    Documentation and Verification of current medications:   [X] Done	[ ] Not done, not elligible    Comments: Will continue with IV Dilaudid PCA.  Recommend non-opioid adjuvant analgesics to be used when possible and when allowed by primary surgical team.    Progress Note written now but Patient was seen earlier.

## 2020-11-29 NOTE — DISCHARGE NOTE PROVIDER - NSDCFUADDINST_GEN_ALL_CORE_FT
Please walk 4-5 x per day, Increase as tolerated. You may climb stairs. Continue to use incentive spirometer.   You may keep all wounds uncovered. Please shower daily. The suture will be removed in office at follow up apt.   See Dr. Amato's office in 2 weeks. Please call [phone] for an apt. Please get an CXR the day before your appointment and bring it with you to your follow up appointment.  Please call the office at [phone] if you have fever's chills, worsening shortness of breath, chest pain, warmth, redness or purulent discharge from the insertion site. Please walk 4-5 x per day, Increase as tolerated. You may climb stairs. Continue to use incentive spirometer.   You may keep all wounds uncovered. Please shower daily. The suture and staples will be removed in office at follow up apt.     Please call the office at [phone] if you have fever's chills, worsening shortness of breath, chest pain, warmth, redness or purulent discharge from the insertion site.

## 2020-11-29 NOTE — PROGRESS NOTE ADULT - SUBJECTIVE AND OBJECTIVE BOX
PROCEDURE: right thoracotomy, RLLobectomy 11/25/2020      ISSUES:   Hypoxia s/p extubation (11/26)  Lung abscess  Empyema  Post op pain  Chest tube in place  HTN      INTERVAL EVENTS:   No fevers.  Chest tube #2 to be removed today.    HISTORY:   Patient reports moderate pain at chest wall incision sites which is worse with coughing and deep breathing without associated fever or dyspnea. Pain is improved with use of pain meds.       PHYSICAL EXAM:   Gen: Comfortable, No acute distress  Eyes: Sclera white, Conjunctiva normal, Eyelids normal, Pupils symmetrical   ENT: Mucous membranes moist,  Neck: Trachea midline,  ,  ,  ,  ,    CV: Rate regular, Rhythm regular,  ,  ,    Resp: Breath sounds clear, No accessory muscles use,  ,  Two chest tubes  Abd: Soft, Non-distended, Non-tender, Bowel sounds normal,  ,  ,    Skin: Warm, No peripheral edema of lower extremities,  ,    : No vargas  Neuro: Moving all 4 extremities,    Psych: A&OX3      ASSESSMENT AND PLAN:     NEURO:  Post-operative Pain - Pain control with PCA and Tylenol IV PRN.              RESPIRATORY:  Hypoxia. S/p extubation (11/26).  Wean NC for goal O2sat above 92.    Chest tube – Pleurevac regulated water seal. Monitor chest tube output.               CARDIOVASCULAR:  Hemodynamically stable - Not on pressors. Continue hemodynamic monitoring.    HTN - hemodynamically stable. continue metoprolol.             RENAL:  DEISY – Resolved. Renally dose medications. Monitor for hyperkalemia and uremia. Avoid nephrotoxic medications. Monitor IOs and electrolytes.         GASTROINTESTINAL:  GI prophylaxis with not indicated  Zofran and Reglan IV PRN for nausea  Regular diet     HEMATOLOGIC:  No signs of active bleeding. Monitor Hgb in CBC in AM  DVT prophylaxis with heparin subQ and SCDs.         INFECTIOUS DISEASE:  All surgical sites appear clean. Will monitor for fever and leukocytosis.      Klebsiella necrotizing pneumonia and empyema -  Zosyn. ID consult.     Follow up cultures           ENDOCRINE:  Stable – Monitor glucose fingersticks for goal 120-180.            Pertinent clinical, laboratory, radiographic, hemodynamic, echocardiographic, respiratory data, microbiologic data and chart were reviewed by myself and analyzed frequently throughout the course of the day and night by myself.    Plan discussed at length with the CTICU staff and Attending CT Surgeon.     Patient's status was discussed with patient at the bedside.  _________________________  VITAL SIGNS:  Vital Signs Last 24 Hrs  T(C): 37.1 (29 Nov 2020 08:00), Max: 37.7 (29 Nov 2020 04:00)  T(F): 98.7 (29 Nov 2020 08:00), Max: 99.8 (29 Nov 2020 04:00)  HR: 79 (29 Nov 2020 08:00) (74 - 101)  BP: 118/73 (29 Nov 2020 08:00) (108/69 - 147/88)  BP(mean): 84 (29 Nov 2020 08:00) (78 - 103)  RR: 17 (29 Nov 2020 08:00) (16 - 23)  SpO2: 98% (29 Nov 2020 08:00) (91% - 100%)  I/Os:   I&O's Detail    28 Nov 2020 07:01  -  29 Nov 2020 07:00  --------------------------------------------------------  IN:    IV PiggyBack: 250 mL    Lactated Ringers: 720 mL    Oral Fluid: 520 mL  Total IN: 1490 mL    OUT:    Chest Tube (mL): 0 mL    Chest Tube (mL): 30 mL    Voided (mL): 2310 mL  Total OUT: 2340 mL    Total NET: -850 mL      29 Nov 2020 07:01  -  29 Nov 2020 08:54  --------------------------------------------------------  IN:    sodium chloride 0.9%: 30 mL  Total IN: 30 mL    OUT:    Voided (mL): 425 mL  Total OUT: 425 mL    Total NET: -395 mL              MEDICATIONS:  MEDICATIONS  (STANDING):  acetaminophen   Tablet .. 650 milliGRAM(s) Oral every 6 hours  acetaminophen  IVPB .. 1000 milliGRAM(s) IV Intermittent once  chlorhexidine 4% Liquid 1 Application(s) Topical at bedtime  dornase samy Solution 2.5 milliGRAM(s) Inhalation every 12 hours  heparin   Injectable 5000 Unit(s) SubCutaneous every 8 hours  HYDROmorphone PCA (1 mG/mL) 30 milliLiter(s) PCA Continuous PCA Continuous  lidocaine   Patch 1 Patch Transdermal every 24 hours  metoprolol tartrate 12.5 milliGRAM(s) Oral every 12 hours  piperacillin/tazobactam IVPB.. 3.375 Gram(s) IV Intermittent every 8 hours  sodium chloride 0.9%. 1000 milliLiter(s) (30 mL/Hr) IV Continuous <Continuous>  sodium chloride 3%  Inhalation 4 milliLiter(s) Inhalation every 6 hours    MEDICATIONS  (PRN):  HYDROmorphone PCA (1 mG/mL) Rescue Clinician Bolus 0.5 milliGRAM(s) IV Push every 15 minutes PRN for Pain Scale GREATER THAN 6  naloxone Injectable 0.1 milliGRAM(s) IV Push every 3 minutes PRN For ANY of the following changes in patient status:  A. RR LESS THAN 10 breaths per minute, B. Oxygen saturation LESS THAN 90%, C. Sedation score of 6  ondansetron Injectable 4 milliGRAM(s) IV Push every 6 hours PRN Nausea and/or Vomiting      LABS:                        12.4   10.69 )-----------( 237      ( 29 Nov 2020 04:15 )             38.3     11-29    134<L>  |  99  |  12  ----------------------------<  144<H>  4.1   |  25  |  0.84    Ca    9.2      29 Nov 2020 04:15  Phos  3.2     11-29  Mg     2.1     11-29    TPro  7.0  /  Alb  3.3  /  TBili  0.6  /  DBili  x   /  AST  20  /  ALT  18  /  AlkPhos  59  11-29    LIVER FUNCTIONS - ( 29 Nov 2020 04:15 )  Alb: 3.3 g/dL / Pro: 7.0 g/dL / ALK PHOS: 59 u/L / ALT: 18 u/L / AST: 20 u/L / GGT: x                 _________________________

## 2020-11-29 NOTE — DISCHARGE NOTE PROVIDER - NSDCFUSCHEDAPPT_GEN_ALL_CORE_FT
BRUCE GAMBLE ; 12/03/2020 ; NPP [facility] [address] BRUCE GAMBLE ; 12/10/2020 ; NPP [facility] [address]

## 2020-11-29 NOTE — DISCHARGE NOTE PROVIDER - HOSPITAL COURSE
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 Patient had multiple bronchoscopies and ablations in the past.  Most recently he had a consolidation in the right lower lobe and the patient had a previous electrocautery ablation of right lower lobe endobronchial obstructive lesions. On 11/16/20, he was s/p Flex Bronch, biopsy, electrofulguration of right lower lobe bronchial stenosis.  Patient presented to ER with complaints of hemoptysis. Pt was having trouble oxygenating in the ED. He went to the OR on the same day and had a Flexible bronchoscopy, right thoracotomy, RLLobectomy, post-operative bronchscopy. 28F R angle diaphragmatic CT, 28F straight posterior CT. Post op course was uncomplicated. Pt had his posterior straight CT removed on 11/29, and was cleared for DC after his anterior CT was removed. 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 Patient had multiple bronchoscopies and ablations in the past.  Most recently he had a consolidation in the right lower lobe and the patient had a previous electrocautery ablation of right lower lobe endobronchial obstructive lesions. On 11/16/20, he was s/p Flex Bronch, biopsy, electrofulguration of right lower lobe bronchial stenosis.  Patient presented to ER with complaints of hemoptysis. Pt was having trouble oxygenating in the ED. He went to the OR on the same day and had a Flexible bronchoscopy, right thoracotomy, RLLobectomy, post-operative bronchscopy. 28F R angle diaphragmatic CT, 28F straight posterior CT. Post op course was uncomplicated. Pt had his posterior straight CT removed on 11/29, On 11/30- Angled CT removed.   CXR remained stable. Pt then had fever of 101.2 that evening. Pan culture sent, ID added Vanco. Pt now afebrile x 48hrs. Cultures and MRSA swab negative.   Leukocytosis improved. Cleared for home by Dr. Amato. Per Dr. Castro, will add Doxy x 5 days with Augmentin x 4 weeks. Pt feels well. Less pain, no  SOB. Has insomnia.  Rt. Thoracotomy c/d/i. Ambulating frequently.   Vital Signs Last 24 Hrs  T(C): 36.7 (02 Dec 2020 10:19), Max: 37.6 (01 Dec 2020 13:31)  T(F): 98 (02 Dec 2020 10:19), Max: 99.6 (01 Dec 2020 13:31)  HR: 87 (02 Dec 2020 10:19) (79 - 98)  BP: 147/86 (02 Dec 2020 10:19) (128/78 - 147/86)  BP(mean): --  RR: 18 (02 Dec 2020 10:19) (18 - 18)  SpO2: 98% (02 Dec 2020 10:19) (95% - 98%)

## 2020-11-29 NOTE — DISCHARGE NOTE PROVIDER - NSDCACTIVITY_GEN_ALL_CORE
Walking - Indoors allowed/Do not make important decisions/No heavy lifting/straining/Driving allowed/Return to Work/School allowed/Sex allowed/Showering allowed/Do not drive or operate machinery/Stairs allowed/Walking - Outdoors allowed Do not make important decisions/Stairs allowed/Driving allowed/No heavy lifting/straining/Walking - Outdoors allowed/Walking - Indoors allowed/Do not drive or operate machinery/Sex allowed/Showering allowed

## 2020-11-29 NOTE — PROGRESS NOTE ADULT - SUBJECTIVE AND OBJECTIVE BOX
Anesthesia Pain Management Service    SUBJECTIVE: Pt doing well with IV PCA without problems reported.    Therapy:	  [ X] IV PCA	   [ ] Epidural           [ ] s/p Spinal Opoid              [ ] Postpartum infusion	  [ ] Patient controlled regional anesthesia (PCRA)    [ ] prn Analgesics    Allergies    No Known Allergies    Intolerances      MEDICATIONS  (STANDING):  acetaminophen   Tablet .. 650 milliGRAM(s) Oral every 6 hours  acetaminophen  IVPB .. 1000 milliGRAM(s) IV Intermittent once  chlorhexidine 4% Liquid 1 Application(s) Topical at bedtime  dornase samy Solution 2.5 milliGRAM(s) Inhalation every 12 hours  heparin   Injectable 5000 Unit(s) SubCutaneous every 8 hours  HYDROmorphone PCA (1 mG/mL) 30 milliLiter(s) PCA Continuous PCA Continuous  lidocaine   Patch 1 Patch Transdermal every 24 hours  metoprolol tartrate 12.5 milliGRAM(s) Oral every 12 hours  piperacillin/tazobactam IVPB.. 3.375 Gram(s) IV Intermittent every 8 hours  sodium chloride 0.9%. 1000 milliLiter(s) (30 mL/Hr) IV Continuous <Continuous>  sodium chloride 3%  Inhalation 4 milliLiter(s) Inhalation every 6 hours    MEDICATIONS  (PRN):  HYDROmorphone PCA (1 mG/mL) Rescue Clinician Bolus 0.5 milliGRAM(s) IV Push every 15 minutes PRN for Pain Scale GREATER THAN 6  naloxone Injectable 0.1 milliGRAM(s) IV Push every 3 minutes PRN For ANY of the following changes in patient status:  A. RR LESS THAN 10 breaths per minute, B. Oxygen saturation LESS THAN 90%, C. Sedation score of 6  ondansetron Injectable 4 milliGRAM(s) IV Push every 6 hours PRN Nausea and/or Vomiting      OBJECTIVE:   [X] No new signs     [ ] Other:    Side Effects:  [X ] None			[ ] Other:    Assessment of Catheter Site:		[ ] Intact		[ ] Other:    ASSESSMENT/PLAN  [ X] Continue current therapy. Recommend non-opioid adjuvant analgesics to be used when possible and when allowed by primary surgical team.    [ ] Therapy changed to:    [ ] IV PCA       [ ] Epidural     [ ] prn Analgesics     Comments:    Progress Note written now but Patient was seen earlier.

## 2020-11-29 NOTE — DISCHARGE NOTE PROVIDER - NSDCMRMEDTOKEN_GEN_ALL_CORE_FT
Augmentin 875 mg-125 mg oral tablet: 1 tab(s) orally every 12 hours   Edarbi 40 mg oral tablet: 1 tab(s) orally once a day   acetaminophen 325 mg oral tablet: 2 tab(s) orally every 6 hours, As Needed  amoxicillin-clavulanate 875 mg-125 mg oral tablet: 1 tab(s) orally every 12 hours   doxycycline hyclate 100 mg oral tablet: 1 tab(s) orally 2 times a day   Edarbi 40 mg oral tablet: 1 tab(s) orally once a day  gabapentin 100 mg oral capsule: 2 cap(s) orally every 8 hours  Metoprolol Tartrate 25 mg oral tablet: 0.5 tab(s) orally 2 times a day   oxyCODONE 5 mg oral tablet: 2 tab(s) orally every 4 hours, As Needed for severe pain. Can take 1 tab for moderate pain. MDD:12

## 2020-11-30 LAB
ALBUMIN SERPL ELPH-MCNC: 3.1 G/DL — LOW (ref 3.3–5)
ALP SERPL-CCNC: 71 U/L — SIGNIFICANT CHANGE UP (ref 40–120)
ALT FLD-CCNC: 21 U/L — SIGNIFICANT CHANGE UP (ref 4–41)
ANION GAP SERPL CALC-SCNC: 12 MMO/L — SIGNIFICANT CHANGE UP (ref 7–14)
APPEARANCE UR: CLEAR — SIGNIFICANT CHANGE UP
AST SERPL-CCNC: 18 U/L — SIGNIFICANT CHANGE UP (ref 4–40)
BACTERIA # UR AUTO: NEGATIVE — SIGNIFICANT CHANGE UP
BILIRUB SERPL-MCNC: 0.4 MG/DL — SIGNIFICANT CHANGE UP (ref 0.2–1.2)
BILIRUB UR-MCNC: NEGATIVE — SIGNIFICANT CHANGE UP
BLOOD UR QL VISUAL: SIGNIFICANT CHANGE UP
BUN SERPL-MCNC: 15 MG/DL — SIGNIFICANT CHANGE UP (ref 7–23)
CALCIUM SERPL-MCNC: 8.8 MG/DL — SIGNIFICANT CHANGE UP (ref 8.4–10.5)
CHLORIDE SERPL-SCNC: 100 MMOL/L — SIGNIFICANT CHANGE UP (ref 98–107)
CO2 SERPL-SCNC: 24 MMOL/L — SIGNIFICANT CHANGE UP (ref 22–31)
COLOR SPEC: SIGNIFICANT CHANGE UP
CREAT SERPL-MCNC: 0.78 MG/DL — SIGNIFICANT CHANGE UP (ref 0.5–1.3)
CULTURE RESULTS: SIGNIFICANT CHANGE UP
GLUCOSE SERPL-MCNC: 109 MG/DL — HIGH (ref 70–99)
GLUCOSE UR-MCNC: NEGATIVE — SIGNIFICANT CHANGE UP
HCT VFR BLD CALC: 36 % — LOW (ref 39–50)
HCT VFR BLD CALC: 39 % — SIGNIFICANT CHANGE UP (ref 39–50)
HGB BLD-MCNC: 11.6 G/DL — LOW (ref 13–17)
HGB BLD-MCNC: 12.7 G/DL — LOW (ref 13–17)
HYALINE CASTS # UR AUTO: NEGATIVE — SIGNIFICANT CHANGE UP
KETONES UR-MCNC: NEGATIVE — SIGNIFICANT CHANGE UP
LEUKOCYTE ESTERASE UR-ACNC: NEGATIVE — SIGNIFICANT CHANGE UP
MAGNESIUM SERPL-MCNC: 2.3 MG/DL — SIGNIFICANT CHANGE UP (ref 1.6–2.6)
MCHC RBC-ENTMCNC: 29 PG — SIGNIFICANT CHANGE UP (ref 27–34)
MCHC RBC-ENTMCNC: 29.5 PG — SIGNIFICANT CHANGE UP (ref 27–34)
MCHC RBC-ENTMCNC: 32.2 % — SIGNIFICANT CHANGE UP (ref 32–36)
MCHC RBC-ENTMCNC: 32.6 % — SIGNIFICANT CHANGE UP (ref 32–36)
MCV RBC AUTO: 90 FL — SIGNIFICANT CHANGE UP (ref 80–100)
MCV RBC AUTO: 90.5 FL — SIGNIFICANT CHANGE UP (ref 80–100)
NITRITE UR-MCNC: NEGATIVE — SIGNIFICANT CHANGE UP
NRBC # FLD: 0 K/UL — SIGNIFICANT CHANGE UP (ref 0–0)
NRBC # FLD: 0 K/UL — SIGNIFICANT CHANGE UP (ref 0–0)
ORGANISM # SPEC MICROSCOPIC CNT: SIGNIFICANT CHANGE UP
PH UR: 7.5 — SIGNIFICANT CHANGE UP (ref 5–8)
PHOSPHATE SERPL-MCNC: 3.2 MG/DL — SIGNIFICANT CHANGE UP (ref 2.5–4.5)
PLATELET # BLD AUTO: 294 K/UL — SIGNIFICANT CHANGE UP (ref 150–400)
PLATELET # BLD AUTO: 322 K/UL — SIGNIFICANT CHANGE UP (ref 150–400)
PMV BLD: 10.1 FL — SIGNIFICANT CHANGE UP (ref 7–13)
PMV BLD: 10.1 FL — SIGNIFICANT CHANGE UP (ref 7–13)
POTASSIUM SERPL-MCNC: 3.7 MMOL/L — SIGNIFICANT CHANGE UP (ref 3.5–5.3)
POTASSIUM SERPL-SCNC: 3.7 MMOL/L — SIGNIFICANT CHANGE UP (ref 3.5–5.3)
PROT SERPL-MCNC: 6.9 G/DL — SIGNIFICANT CHANGE UP (ref 6–8.3)
PROT UR-MCNC: 10 — SIGNIFICANT CHANGE UP
RBC # BLD: 4 M/UL — LOW (ref 4.2–5.8)
RBC # BLD: 4.31 M/UL — SIGNIFICANT CHANGE UP (ref 4.2–5.8)
RBC # FLD: 12.5 % — SIGNIFICANT CHANGE UP (ref 10.3–14.5)
RBC # FLD: 12.5 % — SIGNIFICANT CHANGE UP (ref 10.3–14.5)
RBC CASTS # UR COMP ASSIST: SIGNIFICANT CHANGE UP (ref 0–?)
SODIUM SERPL-SCNC: 136 MMOL/L — SIGNIFICANT CHANGE UP (ref 135–145)
SP GR SPEC: 1.01 — SIGNIFICANT CHANGE UP (ref 1–1.04)
SPECIMEN SOURCE: SIGNIFICANT CHANGE UP
SQUAMOUS # UR AUTO: SIGNIFICANT CHANGE UP
UROBILINOGEN FLD QL: NORMAL — SIGNIFICANT CHANGE UP
WBC # BLD: 11.39 K/UL — HIGH (ref 3.8–10.5)
WBC # BLD: 9.91 K/UL — SIGNIFICANT CHANGE UP (ref 3.8–10.5)
WBC # FLD AUTO: 11.39 K/UL — HIGH (ref 3.8–10.5)
WBC # FLD AUTO: 9.91 K/UL — SIGNIFICANT CHANGE UP (ref 3.8–10.5)
WBC UR QL: SIGNIFICANT CHANGE UP (ref 0–?)

## 2020-11-30 PROCEDURE — 71045 X-RAY EXAM CHEST 1 VIEW: CPT | Mod: 26

## 2020-11-30 PROCEDURE — 71045 X-RAY EXAM CHEST 1 VIEW: CPT | Mod: 26,77

## 2020-11-30 RX ORDER — FAMOTIDINE 10 MG/ML
20 INJECTION INTRAVENOUS DAILY
Refills: 0 | Status: DISCONTINUED | OUTPATIENT
Start: 2020-11-30 | End: 2020-12-02

## 2020-11-30 RX ORDER — POLYETHYLENE GLYCOL 3350 17 G/17G
17 POWDER, FOR SOLUTION ORAL DAILY
Refills: 0 | Status: DISCONTINUED | OUTPATIENT
Start: 2020-11-30 | End: 2020-12-02

## 2020-11-30 RX ORDER — ACETAMINOPHEN 500 MG
650 TABLET ORAL EVERY 6 HOURS
Refills: 0 | Status: DISCONTINUED | OUTPATIENT
Start: 2020-11-30 | End: 2020-12-02

## 2020-11-30 RX ORDER — GABAPENTIN 400 MG/1
200 CAPSULE ORAL EVERY 8 HOURS
Refills: 0 | Status: DISCONTINUED | OUTPATIENT
Start: 2020-11-30 | End: 2020-12-02

## 2020-11-30 RX ORDER — OXYCODONE HYDROCHLORIDE 5 MG/1
10 TABLET ORAL
Refills: 0 | Status: DISCONTINUED | OUTPATIENT
Start: 2020-11-30 | End: 2020-12-02

## 2020-11-30 RX ORDER — SENNA PLUS 8.6 MG/1
2 TABLET ORAL AT BEDTIME
Refills: 0 | Status: DISCONTINUED | OUTPATIENT
Start: 2020-11-30 | End: 2020-12-02

## 2020-11-30 RX ORDER — OXYCODONE HYDROCHLORIDE 5 MG/1
5 TABLET ORAL
Refills: 0 | Status: DISCONTINUED | OUTPATIENT
Start: 2020-11-30 | End: 2020-12-02

## 2020-11-30 RX ADMIN — LIDOCAINE 1 PATCH: 4 CREAM TOPICAL at 18:40

## 2020-11-30 RX ADMIN — OXYCODONE HYDROCHLORIDE 10 MILLIGRAM(S): 5 TABLET ORAL at 13:01

## 2020-11-30 RX ADMIN — PIPERACILLIN AND TAZOBACTAM 25 GRAM(S): 4; .5 INJECTION, POWDER, LYOPHILIZED, FOR SOLUTION INTRAVENOUS at 01:02

## 2020-11-30 RX ADMIN — Medication 12.5 MILLIGRAM(S): at 17:19

## 2020-11-30 RX ADMIN — GABAPENTIN 200 MILLIGRAM(S): 400 CAPSULE ORAL at 23:03

## 2020-11-30 RX ADMIN — OXYCODONE HYDROCHLORIDE 5 MILLIGRAM(S): 5 TABLET ORAL at 10:25

## 2020-11-30 RX ADMIN — OXYCODONE HYDROCHLORIDE 10 MILLIGRAM(S): 5 TABLET ORAL at 01:33

## 2020-11-30 RX ADMIN — HEPARIN SODIUM 5000 UNIT(S): 5000 INJECTION INTRAVENOUS; SUBCUTANEOUS at 23:04

## 2020-11-30 RX ADMIN — HEPARIN SODIUM 5000 UNIT(S): 5000 INJECTION INTRAVENOUS; SUBCUTANEOUS at 13:02

## 2020-11-30 RX ADMIN — PIPERACILLIN AND TAZOBACTAM 25 GRAM(S): 4; .5 INJECTION, POWDER, LYOPHILIZED, FOR SOLUTION INTRAVENOUS at 09:39

## 2020-11-30 RX ADMIN — PIPERACILLIN AND TAZOBACTAM 25 GRAM(S): 4; .5 INJECTION, POWDER, LYOPHILIZED, FOR SOLUTION INTRAVENOUS at 17:18

## 2020-11-30 RX ADMIN — HYDROMORPHONE HYDROCHLORIDE 30 MILLILITER(S): 2 INJECTION INTRAMUSCULAR; INTRAVENOUS; SUBCUTANEOUS at 07:04

## 2020-11-30 RX ADMIN — GABAPENTIN 200 MILLIGRAM(S): 400 CAPSULE ORAL at 13:02

## 2020-11-30 RX ADMIN — Medication 12.5 MILLIGRAM(S): at 05:02

## 2020-11-30 RX ADMIN — HEPARIN SODIUM 5000 UNIT(S): 5000 INJECTION INTRAVENOUS; SUBCUTANEOUS at 05:02

## 2020-11-30 RX ADMIN — LIDOCAINE 1 PATCH: 4 CREAM TOPICAL at 05:03

## 2020-11-30 RX ADMIN — FAMOTIDINE 20 MILLIGRAM(S): 10 INJECTION INTRAVENOUS at 23:36

## 2020-11-30 RX ADMIN — OXYCODONE HYDROCHLORIDE 10 MILLIGRAM(S): 5 TABLET ORAL at 18:02

## 2020-11-30 RX ADMIN — Medication 650 MILLIGRAM(S): at 17:18

## 2020-11-30 RX ADMIN — Medication 650 MILLIGRAM(S): at 05:02

## 2020-11-30 RX ADMIN — POLYETHYLENE GLYCOL 3350 17 GRAM(S): 17 POWDER, FOR SOLUTION ORAL at 13:02

## 2020-11-30 RX ADMIN — Medication 650 MILLIGRAM(S): at 23:03

## 2020-11-30 RX ADMIN — OXYCODONE HYDROCHLORIDE 5 MILLIGRAM(S): 5 TABLET ORAL at 09:38

## 2020-11-30 RX ADMIN — OXYCODONE HYDROCHLORIDE 10 MILLIGRAM(S): 5 TABLET ORAL at 17:18

## 2020-11-30 RX ADMIN — LIDOCAINE 1 PATCH: 4 CREAM TOPICAL at 08:55

## 2020-11-30 NOTE — PROGRESS NOTE ADULT - SUBJECTIVE AND OBJECTIVE BOX
Anesthesia Pain Management Service    SUBJECTIVE: Patient is doing well with IV PCA and no significant problems reported. Patient reports no pain when sitting, but endorses 4/10 pain when coughing or taking a deep breath. Otherwise he states that the PCA helped him. Patient is currently s/p CT removal. He denies nausea, vomiting.     Pain Scale Score	At rest: _0/10__ 	With Activity: _4/10 (when coughing)__ 	[X ] Refer to charted pain scores    THERAPY:    [ ] IV PCA Morphine		[ ] 5 mg/mL	[ ] 1 mg/mL  [X ] IV PCA Hydromorphone	[ ] 5 mg/mL	[X ] 1 mg/mL  [ ] IV PCA Fentanyl		[ ] 50 micrograms/mL    Demand dose __0.2_ lockout __6_ (minutes) Continuous Rate _0__ Total: _4.40__mg used (in past 24 hrs)      MEDICATIONS  (STANDING):  acetaminophen  IVPB .. 1000 milliGRAM(s) IV Intermittent once  dornase samy Solution 2.5 milliGRAM(s) Inhalation every 12 hours  heparin   Injectable 5000 Unit(s) SubCutaneous every 8 hours  lidocaine   Patch 1 Patch Transdermal every 24 hours  metoprolol tartrate 12.5 milliGRAM(s) Oral every 12 hours  piperacillin/tazobactam IVPB.. 3.375 Gram(s) IV Intermittent every 8 hours    MEDICATIONS  (PRN):  naloxone Injectable 0.1 milliGRAM(s) IV Push every 3 minutes PRN For ANY of the following changes in patient status:  A. RR LESS THAN 10 breaths per minute, B. Oxygen saturation LESS THAN 90%, C. Sedation score of 6  ondansetron Injectable 4 milliGRAM(s) IV Push every 6 hours PRN Nausea and/or Vomiting  oxyCODONE    IR 5 milliGRAM(s) Oral every 3 hours PRN Moderate Pain (4 - 6)  oxyCODONE    IR 10 milliGRAM(s) Oral every 3 hours PRN Severe Pain (7 - 10)      OBJECTIVE: Patient sitting up in bed, awake and alert, answering questions appropriately. Patient now without chest tubes.     Sedation Score:	[ X] Alert	[ ] Drowsy 	[ ] Arousable	[ ] Asleep	[ ] Unresponsive    Side Effects:	[X ] None	[ ] Nausea	[ ] Vomiting	[ ] Pruritus  		[ ] Other:    Vital Signs Last 24 Hrs  T(C): 37.1 (30 Nov 2020 08:13), Max: 37.6 (29 Nov 2020 16:08)  T(F): 98.8 (30 Nov 2020 08:13), Max: 99.7 (29 Nov 2020 16:08)  HR: 79 (30 Nov 2020 08:13) (79 - 106)  BP: 114/76 (30 Nov 2020 08:13) (114/76 - 154/94)  BP(mean): 82 (29 Nov 2020 10:00) (82 - 82)  RR: 18 (30 Nov 2020 08:13) (18 - 21)  SpO2: 96% (30 Nov 2020 08:13) (94% - 98%)    ASSESSMENT/ PLAN    Therapy to  be:	[ ] Continue   [ X] Discontinued   [X ] Change to prn Analgesics    Documentation and Verification of current medications:   [X] Done	[ ] Not done, not elligible    Comments: P  - Pt tolerating regular diet, and now s/p removal of all CTs  - can transition from IV PCA to oral analgesics PRN  - continue with non-opioid adjuvants    Progress Note written now but Patient was seen earlier.      Rufina Parra, PGY 2  Anesthesiology  Pager: [phone]

## 2020-11-30 NOTE — PROGRESS NOTE ADULT - SUBJECTIVE AND OBJECTIVE BOX
Subjective: "I have some pain where my tube is, It's stopping me from taking a deep breath or coughing sometimes" Pt denies CP or SOB. Using IS to 1000. Amb with minimal assist.     Vital Signs:  Vital Signs Last 24 Hrs  T(C): 37.1 (11-30-20 @ 08:13), Max: 37.6 (11-29-20 @ 16:08)  T(F): 98.8 (11-30-20 @ 08:13), Max: 99.7 (11-29-20 @ 16:08)  HR: 79 (11-30-20 @ 08:13) (79 - 106)  BP: 114/76 (11-30-20 @ 08:13) (114/76 - 154/94)  RR: 18 (11-30-20 @ 08:13) (18 - 20)  SpO2: 96% (11-30-20 @ 08:13) (94% - 98%) on (O2)    Telemetry/Alarms: Tele SR  General: WN/WD NAD  Neurology: Awake, nonfocal, WEISS x 4  Eyes: Scleras clear, PERRLA/ EOMI, Gross vision intact  ENT:Gross hearing intact, grossly patent pharynx, no stridor  Neck: Neck supple, trachea midline, No JVD,   Respiratory: Decreased BS to Rt. LL  CV: RRR, S1S2, no murmurs, rubs or gallops  Abdominal: Soft, NT, ND +BS, no BM  Extremities: Trace pedal bilat edema, + peripheral pulses  Skin: No Rashes, Hematoma, Ecchymosis  Lymphatic: No Neck, axilla, groin LAD  Psych: Oriented x 3, normal affect  Incisions: Rt. Thoracotomy FRANNIE with staples c/d/i.   Tubes: Rt. CT with minimal output, no AL. CT d/c'd this am at bedside. Pt tolerated procedure well. FU CXR pending.   Relevant labs, radiology and Medications reviewed           CXR with small right pleural effusion.              11.6   9.91  )-----------( 294      ( 30 Nov 2020 06:06 )             36.0     11-30    136  |  100  |  15  ----------------------------<  109<H>  3.7   |  24  |  0.78    Ca    8.8      30 Nov 2020 06:06  Phos  3.2     11-30  Mg     2.3     11-30    TPro  6.9  /  Alb  3.1<L>  /  TBili  0.4  /  DBili  x   /  AST  18  /  ALT  21  /  AlkPhos  71  11-30      MEDICATIONS  (STANDING):  acetaminophen  IVPB .. 1000 milliGRAM(s) IV Intermittent once  dornase samy Solution 2.5 milliGRAM(s) Inhalation every 12 hours  gabapentin 200 milliGRAM(s) Oral every 8 hours  heparin   Injectable 5000 Unit(s) SubCutaneous every 8 hours  lidocaine   Patch 1 Patch Transdermal every 24 hours  metoprolol tartrate 12.5 milliGRAM(s) Oral every 12 hours  piperacillin/tazobactam IVPB.. 3.375 Gram(s) IV Intermittent every 8 hours  polyethylene glycol 3350 17 Gram(s) Oral daily  senna 2 Tablet(s) Oral at bedtime    MEDICATIONS  (PRN):  naloxone Injectable 0.1 milliGRAM(s) IV Push every 3 minutes PRN For ANY of the following changes in patient status:  A. RR LESS THAN 10 breaths per minute, B. Oxygen saturation LESS THAN 90%, C. Sedation score of 6  ondansetron Injectable 4 milliGRAM(s) IV Push every 6 hours PRN Nausea and/or Vomiting  oxyCODONE    IR 5 milliGRAM(s) Oral every 3 hours PRN Moderate Pain (4 - 6)  oxyCODONE    IR 10 milliGRAM(s) Oral every 3 hours PRN Severe Pain (7 - 10)    Pertinent Physical Exam  I&O's Summary    29 Nov 2020 07:01 - 30 Nov 2020 07:00  --------------------------------------------------------  IN: 520 mL / OUT: 2805 mL / NET: -2285 mL    30 Nov 2020 07:01  -  30 Nov 2020 10:40  --------------------------------------------------------  IN: 0 mL / OUT: 450 mL / NET: -450 mL      Assessment  45y Male  w/ PAST MEDICAL & SURGICAL HISTORY:  Psoriasis    Hyperlipidemia    Obesity    VIDHI on CPAP    HTN (hypertension)    S/P bronchoscopy  3/2019,8/2019, 10/2019    admitted with complaints of Patient is a 45y old  Male who presents with a chief complaint of hemoptysis, Right thoracotomy (30 Nov 2020 09:11)  .45_year_old male with history of htn, Hyperlipidemia, Obesity, VIDHI on CPAP, Psoriasis,  former smoker, who had a history of pepper aspiration with 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 Patient had multiple bronchoscopies and ablations in the past.  Most recently he had a consolidation in the right lower lobe and the patient had a previous electrocautery ablation of right lower lobe endobronchial obstructive lesions. On 11/16/20, he was s/p Flex Bronch, biopsy, electrofulguration of right lower lobe bronchial stenosis.  Patient presented to ER tonight with complaints of hemoptysis which started since yesterday.  As per ER patient had emesis bag with about 150cc of bright red blood.  Patient was hypoxic, 83% on room air and complaining of difficulty breathing.  Patient was intubated in ER, admitted to [facility] for further management.       Procedure:   Flexible bronchoscopy, right thoracotomy, RLLobectomy, post-operative bronchscopy. 28F R angle diaphragmatic CT, 28F straight posterior CT.  Post op OR cultures showing pan sensitive Kleb. Pt improved clinically. No further fevers, leukocytosis improved. ID consulted called. 11/29- Posterior CT removed. Today final CT removed. Continued on zosyn. .         PLAN  Neuro: Pain management. Will transition off PCA and begin oral regiment for pain.   Pulm: Encourage coughing, deep breathing and use of incentive spirometry. . Daily CXR.   Cardio: Monitor telemetry/alarms  GI: Tolerating diet. Will start bowel regiment   Renal: monitor urine output, supplement electrolytes as needed  Vasc: Heparin SC/SCDs for DVT prophylaxis  Heme: Stable H/H. .   ID: Continue Zosyn. As per ID, will recommend 4wks po Augementin for upon discharge.   Therapy: OOB/ambulate  Tubes: CT removed this am, FU with CXR.   Disposition: Aim to D/C to home tomorrow if clinically well and pain controlled.   Discussed with Cardiothoracic Team at AM rounds.

## 2020-11-30 NOTE — PROGRESS NOTE ADULT - SUBJECTIVE AND OBJECTIVE BOX
Infectious Diseases progress note:    Subjective:  NAD, afebrile.  WBC normalized.  Last chest tube removed today.  Pt planned for possible d/c tomorrow. No new somatic complaints.     ROS:  CONSTITUTIONAL:  No fever, chills, rigors  CARDIOVASCULAR:  No chest pain or palpitations  RESPIRATORY:   No SOB, cough, dyspnea on exertion.  No wheezing  GASTROINTESTINAL:  No abd pain, N/V, diarrhea/constipation  EXTREMITIES:  No swelling or joint pain  GENITOURINARY:  No burning on urination, increased frequency or urgency.  No flank pain  NEUROLOGIC:  No HA, visual disturbances  SKIN: No rashes    Allergies    No Known Allergies    Intolerances        ANTIBIOTICS/RELEVANT:  antimicrobials  piperacillin/tazobactam IVPB.. 3.375 Gram(s) IV Intermittent every 8 hours    immunologic:    OTHER:  acetaminophen  IVPB .. 1000 milliGRAM(s) IV Intermittent once  dornase samy Solution 2.5 milliGRAM(s) Inhalation every 12 hours  gabapentin 200 milliGRAM(s) Oral every 8 hours  heparin   Injectable 5000 Unit(s) SubCutaneous every 8 hours  lidocaine   Patch 1 Patch Transdermal every 24 hours  metoprolol tartrate 12.5 milliGRAM(s) Oral every 12 hours  naloxone Injectable 0.1 milliGRAM(s) IV Push every 3 minutes PRN  ondansetron Injectable 4 milliGRAM(s) IV Push every 6 hours PRN  oxyCODONE    IR 5 milliGRAM(s) Oral every 3 hours PRN  oxyCODONE    IR 10 milliGRAM(s) Oral every 3 hours PRN  polyethylene glycol 3350 17 Gram(s) Oral daily  senna 2 Tablet(s) Oral at bedtime      Objective:  Vital Signs Last 24 Hrs  T(C): 37.2 (30 Nov 2020 13:07), Max: 37.6 (29 Nov 2020 16:08)  T(F): 99 (30 Nov 2020 13:07), Max: 99.7 (29 Nov 2020 16:08)  HR: 98 (30 Nov 2020 13:07) (79 - 106)  BP: 138/78 (30 Nov 2020 13:07) (114/76 - 154/94)  BP(mean): --  RR: 19 (30 Nov 2020 13:07) (18 - 20)  SpO2: 96% (30 Nov 2020 13:07) (94% - 98%)    PHYSICAL EXAM:  Constitutional:NAD  Eyes:GUSTAVO, EOMI  Ear/Nose/Throat: no thrush, mucositis.  Moist mucous membranes	  Neck:no JVD, no lymphadenopathy, supple  Respiratory: CTA breanna  Cardiovascular: S1S2 RRR, no murmurs  Gastrointestinal:soft, nontender,  nondistended (+) BS  Extremities:no e/e/c  Skin:  surgical dsg c/d/i        LABS:                        11.6   9.91  )-----------( 294      ( 30 Nov 2020 06:06 )             36.0     11-30    136  |  100  |  15  ----------------------------<  109<H>  3.7   |  24  |  0.78    Ca    8.8      30 Nov 2020 06:06  Phos  3.2     11-30  Mg     2.3     11-30    TPro  6.9  /  Alb  3.1<L>  /  TBili  0.4  /  DBili  x   /  AST  18  /  ALT  21  /  AlkPhos  71  11-30                Vancomycin Level, Trough: 2.1 ug/mL (11-26 @ 17:35)      Rapid RVP Result: [facility]          MICROBIOLOGY:      Culture - Fungal, Tissue (11.25.20 @ 17:01)   Specimen Source: .Tissue Right Lower Lobe   Culture Results:   Testing in progress       Culture - Tissue with Gram Stain (11.25.20 @ 16:58)   - Levofloxacin: S <=0.5   - Meropenem: S <=1   - Piperacillin/Tazobactam: S <=8   - Tobramycin: S <=2   - Trimethoprim/Sulfamethoxazole: S <=0.5/9.5   Gram Stain:   Moderate polymorphonuclear leukocytes per low power field   No organisms seen   - Amikacin: S <=16   - Amoxicillin/Clavulanic Acid: S <=8/4   - Ampicillin: R >16 These ampicillin results predict results for amoxicillin   - Ampicillin/Sulbactam: S <=4/2 Enterobacter, Citrobacter, and Serratia may develop resistance during prolonged therapy (3-4 days)   - Aztreonam: S <=4   - Cefazolin: S <=2 Enterobacter, Citrobacter, and Serratia may develop resistance during prolonged therapy (3-4 days)   - Cefepime: S <=2   - Cefoxitin: S <=8   - Ceftriaxone: S <=1 Enterobacter, Citrobacter, and Serratia may develop resistance during prolonged therapy   - Ciprofloxacin: S <=0.25   - Ertapenem: S <=0.5   - Gentamicin: S <=2   - Imipenem: S <=1   Specimen Source: .Tissue Right Lower Lobe   Culture Results:   Rare Klebsiella pneumoniae   Organism Identification: Klebsiella pneumoniae   Organism: Klebsiella pneumoniae   Method Type: JENNIFER       Culture - Fungal, Body Fluid (11.25.20 @ 16:48)   Specimen Source: .Body Fluid #1 (R) PLEURAL   Culture Results:   Testing in progress           Culture - Acid Fast - Body Fluid w/Smear (11.25.20 @ 16:48)   Specimen Source: .Body Fluid #1 (R) PLEURAL   Acid Fast Bacilli Smear:   No acid fast bacilli seen by fluorochrome stain   Culture Results:   Culture is being performed.       Culture - Body Fluid with Gram Stain (11.25.20 @ 12:30)   - Trimethoprim/Sulfamethoxazole: S <=0.5/9.5   - Levofloxacin: S <=0.5   - Piperacillin/Tazobactam: S <=8   - Tobramycin: S <=2   - Meropenem: S <=1   - Amikacin: S <=16   Gram Stain:   polymorphonuclear leukocytes seen   No organisms seen   by cytocentrifuge   - Imipenem: S <=1   - Cefazolin: S <=2 Enterobacter, Citrobacter, and Serratia may develop resistance during prolonged therapy (3-4 days)   - Cefoxitin: S <=8   - Ciprofloxacin: S <=0.25   - Gentamicin: S <=2   - Ertapenem: S <=0.5   - Ceftriaxone: S <=1 Enterobacter, Citrobacter, and Serratia may develop resistance during prolonged therapy   - Cefepime: S <=2   - Aztreonam: S <=4   - Ampicillin: R >16 These ampicillin results predict results for amoxicillin   - Amoxicillin/Clavulanic Acid: S <=8/4   - Ampicillin/Sulbactam: S 8/4 Enterobacter, Citrobacter, and Serratia may develop resistance during prolonged therapy (3-4 days)   Specimen Source: .Body Fluid #1 (R) PLEURAL   Culture Results:   Few Klebsiella pneumoniae   Organism Identification: Klebsiella pneumoniae   Organism: Klebsiella pneumoniae   Method Type: JENNIFER        Culture - Blood (11.25.20 @ 10:40)   Specimen Source: .Blood Blood   Culture Results:   No Growth Final   RADIOLOGY & ADDITIONAL STUDIES:    < from: Xray Chest 1 View- PORTABLE-Urgent (Xray Chest 1 View- PORTABLE-Urgent .) (11.30.20 @ 10:50) >  IMPRESSION:    No pneumothorax.    Redemonstrated small right loculated pleural effusion.    < end of copied text >

## 2020-11-30 NOTE — PROGRESS NOTE ADULT - ASSESSMENT
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 Patient had multiple bronchoscopies and ablations in the past.  Most recently he had a consolidation in the right lower lobe and the patient had a previous electrocautery ablation of right lower lobe endobronchial obstructive lesions. On 11/16/20, he was s/p Flex Bronch, biopsy, electrofulguration of right lower lobe bronchial stenosis.  Patient presented to ER tonight with complaints of hemoptysis which started since yesterday.  As per ER patient had emesis bag with about 150cc of bright red blood.  Patient was hypoxic, 83% on room air and complaining of difficulty breathing. Patient was intubated in ER, admitted to [facility] for further management.   (25 Nov 2020 04:39)    HOSP COURSE:    11/25 - Flex bronch with BAL and aspiration of blood clots, rt thoracotomy, RLL lobectomy  Pt maintained on ventilator and pressors, managed for septic shock and acute hypoxic respiratory failure in CT ICU.  Now extubated and transferred to surgical floor.  Cultures growing Klebsiella.  ID consulted for further abx managment.    Necrotizing pna:    - Pt with empyema and necrotizing pna, s/p RLL lobectomy.  Pt a/w severe sepsis and shock with acute hypoxic respiratory failure, s/p intubation and pressor support.    - Intra-op pleural fluid and tiss cx (+) Klebsiella pna.  Cont zosyn for now.  Recommend IV abx for now.     - Anticipate eventual switch to oral therapy.  Recommend extended course of PO abx with augmentin 875mg x 4 more weeks upon discharge    - f/u bcx, all intra-op culture data - growing sensitive Klebsiella pneum.     -   f/u pathology.      Kati [facility]  [phone]

## 2020-12-01 LAB
GRAM STN FLD: SIGNIFICANT CHANGE UP
HCT VFR BLD CALC: 37.7 % — LOW (ref 39–50)
HGB BLD-MCNC: 12.1 G/DL — LOW (ref 13–17)
MCHC RBC-ENTMCNC: 29.5 PG — SIGNIFICANT CHANGE UP (ref 27–34)
MCHC RBC-ENTMCNC: 32.1 % — SIGNIFICANT CHANGE UP (ref 32–36)
MCV RBC AUTO: 92 FL — SIGNIFICANT CHANGE UP (ref 80–100)
NRBC # FLD: 0 K/UL — SIGNIFICANT CHANGE UP (ref 0–0)
PLATELET # BLD AUTO: 395 K/UL — SIGNIFICANT CHANGE UP (ref 150–400)
PMV BLD: 10.1 FL — SIGNIFICANT CHANGE UP (ref 7–13)
RBC # BLD: 4.1 M/UL — LOW (ref 4.2–5.8)
RBC # FLD: 12.9 % — SIGNIFICANT CHANGE UP (ref 10.3–14.5)
SPECIMEN SOURCE: SIGNIFICANT CHANGE UP
WBC # BLD: 13.31 K/UL — HIGH (ref 3.8–10.5)
WBC # FLD AUTO: 13.31 K/UL — HIGH (ref 3.8–10.5)

## 2020-12-01 PROCEDURE — 71045 X-RAY EXAM CHEST 1 VIEW: CPT | Mod: 26

## 2020-12-01 RX ORDER — VANCOMYCIN HCL 1 G
1250 VIAL (EA) INTRAVENOUS EVERY 12 HOURS
Refills: 0 | Status: DISCONTINUED | OUTPATIENT
Start: 2020-12-01 | End: 2020-12-02

## 2020-12-01 RX ADMIN — LIDOCAINE 1 PATCH: 4 CREAM TOPICAL at 08:13

## 2020-12-01 RX ADMIN — LIDOCAINE 1 PATCH: 4 CREAM TOPICAL at 20:00

## 2020-12-01 RX ADMIN — FAMOTIDINE 20 MILLIGRAM(S): 10 INJECTION INTRAVENOUS at 12:35

## 2020-12-01 RX ADMIN — Medication 12.5 MILLIGRAM(S): at 05:06

## 2020-12-01 RX ADMIN — LIDOCAINE 1 PATCH: 4 CREAM TOPICAL at 05:06

## 2020-12-01 RX ADMIN — DORNASE ALFA 2.5 MILLIGRAM(S): 1 SOLUTION RESPIRATORY (INHALATION) at 22:03

## 2020-12-01 RX ADMIN — PIPERACILLIN AND TAZOBACTAM 25 GRAM(S): 4; .5 INJECTION, POWDER, LYOPHILIZED, FOR SOLUTION INTRAVENOUS at 01:01

## 2020-12-01 RX ADMIN — POLYETHYLENE GLYCOL 3350 17 GRAM(S): 17 POWDER, FOR SOLUTION ORAL at 12:34

## 2020-12-01 RX ADMIN — HEPARIN SODIUM 5000 UNIT(S): 5000 INJECTION INTRAVENOUS; SUBCUTANEOUS at 12:39

## 2020-12-01 RX ADMIN — DORNASE ALFA 2.5 MILLIGRAM(S): 1 SOLUTION RESPIRATORY (INHALATION) at 08:26

## 2020-12-01 RX ADMIN — GABAPENTIN 200 MILLIGRAM(S): 400 CAPSULE ORAL at 12:40

## 2020-12-01 RX ADMIN — Medication 650 MILLIGRAM(S): at 17:24

## 2020-12-01 RX ADMIN — PIPERACILLIN AND TAZOBACTAM 25 GRAM(S): 4; .5 INJECTION, POWDER, LYOPHILIZED, FOR SOLUTION INTRAVENOUS at 10:34

## 2020-12-01 RX ADMIN — Medication 166.67 MILLIGRAM(S): at 16:34

## 2020-12-01 RX ADMIN — Medication 650 MILLIGRAM(S): at 05:06

## 2020-12-01 RX ADMIN — HEPARIN SODIUM 5000 UNIT(S): 5000 INJECTION INTRAVENOUS; SUBCUTANEOUS at 05:06

## 2020-12-01 RX ADMIN — HEPARIN SODIUM 5000 UNIT(S): 5000 INJECTION INTRAVENOUS; SUBCUTANEOUS at 23:11

## 2020-12-01 RX ADMIN — GABAPENTIN 200 MILLIGRAM(S): 400 CAPSULE ORAL at 23:11

## 2020-12-01 RX ADMIN — Medication 12.5 MILLIGRAM(S): at 17:24

## 2020-12-01 RX ADMIN — PIPERACILLIN AND TAZOBACTAM 25 GRAM(S): 4; .5 INJECTION, POWDER, LYOPHILIZED, FOR SOLUTION INTRAVENOUS at 17:24

## 2020-12-01 RX ADMIN — GABAPENTIN 200 MILLIGRAM(S): 400 CAPSULE ORAL at 05:06

## 2020-12-01 RX ADMIN — Medication 650 MILLIGRAM(S): at 12:37

## 2020-12-01 NOTE — PROGRESS NOTE ADULT - REASON FOR ADMISSION
hemoptysis
hemoptysis, Right thoracotomy
hemoptysis

## 2020-12-01 NOTE — PROGRESS NOTE ADULT - ASSESSMENT
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 Patient had multiple bronchoscopies and ablations in the past.  Most recently he had a consolidation in the right lower lobe and the patient had a previous electrocautery ablation of right lower lobe endobronchial obstructive lesions. On 11/16/20, he was s/p Flex Bronch, biopsy, electrofulguration of right lower lobe bronchial stenosis.  Patient presented to ER tonight with complaints of hemoptysis which started since yesterday.  As per ER patient had emesis bag with about 150cc of bright red blood.  Patient was hypoxic, 83% on room air and complaining of difficulty breathing. Patient was intubated in ER, admitted to [facility] for further management.   (25 Nov 2020 04:39)    HOSP COURSE:    11/25 - Flex bronch with BAL and aspiration of blood clots, rt thoracotomy, RLL lobectomy  Pt maintained on ventilator and pressors, managed for septic shock and acute hypoxic respiratory failure in CT ICU.  Now extubated and transferred to surgical floor.  Cultures growing Klebsiella.  ID consulted for further abx managment.    Necrotizing pna:    - Pt with empyema and necrotizing pna, s/p RLL lobectomy.  Pt a/w severe sepsis and shock with acute hypoxic respiratory failure, s/p intubation and pressor support.    - Intra-op pleural fluid and tiss cx (+) Klebsiella pna.  Cont zosyn for now.  Recommend IV abx for now.     - Anticipate eventual switch to oral therapy.  Recommend extended course of PO abx with augmentin 875mg x 4 more weeks upon discharge    - f/u bcx, all intra-op culture data - growing sensitive Klebsiella pneum.     -   f/u pathology.      New fever (11/30)    - f/u repeat bcx x 2, sputum cx.  Resume vancomycin until further results available.  cont zosyn    - Check nasal mrsa screen.      Diarrhea:    - If multiple watery stools, send stool for cdiff    - Probiotics            Kati Castro  [phone]

## 2020-12-01 NOTE — PROGRESS NOTE ADULT - SUBJECTIVE AND OBJECTIVE BOX
Subjective: pt feels ok this AM  no acute complaints  pt had fever 101.2 yesterday at 4:30p    Vital Signs:  Vital Signs Last 24 Hrs  T(C): 37.1 (12-01-20 @ 04:58), Max: 38.4 (11-30-20 @ 17:16)  T(F): 98.8 (12-01-20 @ 04:58), Max: 101.2 (11-30-20 @ 17:16)  HR: 94 (12-01-20 @ 04:58) (79 - 113)  BP: 129/77 (12-01-20 @ 04:58) (114/76 - 138/87)  RR: 18 (12-01-20 @ 04:58) (18 - 20)  SpO2: 98% (12-01-20 @ 04:58) (96% - 98%) on (O2)    Telemetry/Alarms: sr  General: WN/WD NAD  Neurology: Awake, nonfocal, WEISS x 4  Eyes: Scleras clear, PERRLA/ EOMI, Gross vision intact  ENT:Gross hearing intact, grossly patent pharynx, no stridor  Neck: Neck supple, trachea midline, No JVD,   Respiratory: CTA B/L, No wheezing, rales, rhonchi  CV: RRR, S1S2, no murmurs, rubs or gallops  Abdominal: Soft, NT, ND +BS,   Extremities: No edema, + peripheral pulses  Skin: No Rashes, Hematoma, Ecchymosis  Lymphatic: No Neck, axilla, groin LAD  Psych: Oriented x 3, normal affect  Incisions: c,d,i  Tubes: no chest tube  Relevant labs, radiology and Medications reviewed                        12.7   11.39 )-----------( 322      ( 30 Nov 2020 18:00 )             39.0     11-30    136  |  100  |  15  ----------------------------<  109<H>  3.7   |  24  |  0.78    Ca    8.8      30 Nov 2020 06:06  Phos  3.2     11-30  Mg     2.3     11-30    TPro  6.9  /  Alb  3.1<L>  /  TBili  0.4  /  DBili  x   /  AST  18  /  ALT  21  /  AlkPhos  71  11-30      MEDICATIONS  (STANDING):  acetaminophen   Tablet .. 650 milliGRAM(s) Oral every 6 hours  acetaminophen  IVPB .. 1000 milliGRAM(s) IV Intermittent once  dornase samy Solution 2.5 milliGRAM(s) Inhalation every 12 hours  famotidine    Tablet 20 milliGRAM(s) Oral daily  gabapentin 200 milliGRAM(s) Oral every 8 hours  heparin   Injectable 5000 Unit(s) SubCutaneous every 8 hours  lidocaine   Patch 1 Patch Transdermal every 24 hours  metoprolol tartrate 12.5 milliGRAM(s) Oral every 12 hours  piperacillin/tazobactam IVPB.. 3.375 Gram(s) IV Intermittent every 8 hours  polyethylene glycol 3350 17 Gram(s) Oral daily  senna 2 Tablet(s) Oral at bedtime    MEDICATIONS  (PRN):  naloxone Injectable 0.1 milliGRAM(s) IV Push every 3 minutes PRN For ANY of the following changes in patient status:  A. RR LESS THAN 10 breaths per minute, B. Oxygen saturation LESS THAN 90%, C. Sedation score of 6  ondansetron Injectable 4 milliGRAM(s) IV Push every 6 hours PRN Nausea and/or Vomiting  oxyCODONE    IR 5 milliGRAM(s) Oral every 3 hours PRN Moderate Pain (4 - 6)  oxyCODONE    IR 10 milliGRAM(s) Oral every 3 hours PRN Severe Pain (7 - 10)    Pertinent Physical Exam  I&O's Summary    30 Nov 2020 07:01  -  01 Dec 2020 07:00  --------------------------------------------------------  IN: 100 mL / OUT: 1850 mL / NET: -1750 mL        Assessment  .45_year_old male with history of htn, Hyperlipidemia, Obesity, VIDHI on CPAP, Psoriasis,  former smoker, who had a history of pepper aspiration with 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 Patient had multiple bronchoscopies and ablations in the past.  Most recently he had a consolidation in the right lower lobe and the patient had a previous electrocautery ablation of right lower lobe endobronchial obstructive lesions. On 11/16/20, he was s/p Flex Bronch, biopsy, electrofulguration of right lower lobe bronchial stenosis.  Patient presented to ER tonight with complaints of hemoptysis which started since yesterday.  As per ER patient had emesis bag with about 150cc of bright red blood.  Patient was hypoxic, 83% on room air and complaining of difficulty breathing.  Patient was intubated in ER, admitted to [facility] for further management.       Procedure:   Flexible bronchoscopy, right thoracotomy, RLLobectomy, post-operative bronchscopy. 28F R angle diaphragmatic CT, 28F straight posterior CT.  Post op OR cultures showing pan sensitive Kleb. Pt improved clinically. No further fevers, leukocytosis improved. ID consulted called. 11/29- Posterior CT removed. Today final CT removed. Continued on zosyn. 11/30 pt had fever        PLAN  Neuro: Pain management. Continue oral regiment for pain.   Pulm: Encourage coughing, deep breathing and use of incentive spirometry. . Daily CXR.   Cardio: Monitor telemetry/alarms  GI: Tolerating diet. Will start bowel regiment   Renal: monitor urine output, supplement electrolytes as needed  Vasc: Heparin SC/SCDs for DVT prophylaxis  Heme: Stable H/H. .   ID: Continue Zosyn. As per ID, will recommend 4wks po Augementin for upon discharge; will f/u rec given fever yesterday  Therapy: OOB/ambulate  Disposition: Aim to D/C to home once cleared by ID    Discussed with Cardiothoracic Team at AM rounds.

## 2020-12-01 NOTE — PROGRESS NOTE ADULT - SUBJECTIVE AND OBJECTIVE BOX
Infectious Diseases progress note:    Subjective:  Events noted.  Pt with febrile episode.  Repeat bcx sent, sputum cx sent.  Pt on zosyn, vanco resumed.  Pt states he has been feeling somewhat weaker throughout the day.  Has occasional cough with phlegm, a few brown specks.   WBC elevated.  States 3 loose stools today.  Not much of an appetite.      ROS:  CONSTITUTIONAL:  No fever, chills, rigors  CARDIOVASCULAR:  No chest pain or palpitations  RESPIRATORY:   No SOB, cough, dyspnea on exertion.  No wheezing  GASTROINTESTINAL:  No abd pain, N/V, diarrhea/constipation  EXTREMITIES:  No swelling or joint pain  GENITOURINARY:  No burning on urination, increased frequency or urgency.  No flank pain  NEUROLOGIC:  No HA, visual disturbances  SKIN: No rashes    Allergies    No Known Allergies    Intolerances        ANTIBIOTICS/RELEVANT:  antimicrobials  piperacillin/tazobactam IVPB.. 3.375 Gram(s) IV Intermittent every 8 hours  vancomycin  IVPB 1250 milliGRAM(s) IV Intermittent every 12 hours    immunologic:    OTHER:  acetaminophen   Tablet .. 650 milliGRAM(s) Oral every 6 hours  acetaminophen  IVPB .. 1000 milliGRAM(s) IV Intermittent once  dornase samy Solution 2.5 milliGRAM(s) Inhalation every 12 hours  famotidine    Tablet 20 milliGRAM(s) Oral daily  gabapentin 200 milliGRAM(s) Oral every 8 hours  heparin   Injectable 5000 Unit(s) SubCutaneous every 8 hours  lidocaine   Patch 1 Patch Transdermal every 24 hours  metoprolol tartrate 12.5 milliGRAM(s) Oral every 12 hours  naloxone Injectable 0.1 milliGRAM(s) IV Push every 3 minutes PRN  ondansetron Injectable 4 milliGRAM(s) IV Push every 6 hours PRN  oxyCODONE    IR 10 milliGRAM(s) Oral every 3 hours PRN  oxyCODONE    IR 5 milliGRAM(s) Oral every 3 hours PRN  polyethylene glycol 3350 17 Gram(s) Oral daily  senna 2 Tablet(s) Oral at bedtime      Objective:  Vital Signs Last 24 Hrs  T(C): 36.9 (01 Dec 2020 19:28), Max: 37.6 (01 Dec 2020 13:31)  T(F): 98.5 (01 Dec 2020 19:28), Max: 99.6 (01 Dec 2020 13:31)  HR: 81 (01 Dec 2020 19:28) (81 - 98)  BP: 137/80 (01 Dec 2020 19:28) (128/78 - 137/83)  BP(mean): --  RR: 18 (01 Dec 2020 19:28) (18 - 20)  SpO2: 95% (01 Dec 2020 19:28) (95% - 98%)    PHYSICAL EXAM:  Constitutional:NAD  Eyes:GUSTAVO, EOMI  Ear/Nose/Throat: no thrush, mucositis.  Moist mucous membranes	  Neck:no JVD, no lymphadenopathy, supple  Respiratory: CTA breanna  Cardiovascular: S1S2 RRR, no murmurs  Gastrointestinal:soft, nontender,  nondistended (+) BS  Extremities:no e/e/c  Skin:  surgical incision sites c/d/i        LABS:                        12.1   13.31 )-----------( 395      ( 01 Dec 2020 05:45 )             37.7     11-30    136  |  100  |  15  ----------------------------<  109<H>  3.7   |  24  |  0.78    Ca    8.8      2020 06:06  Phos  3.2     -  Mg     2.3         TPro  6.9  /  Alb  3.1<L>  /  TBili  0.4  /  DBili  x   /  AST  18  /  ALT  21  /  AlkPhos  71  11-30      Urinalysis Basic - ( 2020 18:10 )    Color: LIGHT YELLOW / Appearance: CLEAR / S.015 / pH: 7.5  Gluc: NEGATIVE / Ketone: NEGATIVE  / Bili: NEGATIVE / Urobili: NORMAL   Blood: SMALL / Protein: 10 / Nitrite: NEGATIVE   Leuk Esterase: NEGATIVE / RBC: 3-5 / WBC 0-2   Sq Epi: OCC / Non Sq Epi: x / Bacteria: NEGATIVE                  Rapid RVP Result: [facility]          MICROBIOLOGY:      Culture - Sputum . (20 @ 02:13)   Gram Stain:   Rare polymorphonuclear leukocytes per low power field   Rare Squamous epithelial cells per low power field   Moderate Gram Positive Cocci in Pairs and Chains seen per oil power field   Few Gram Positive Cocci in Clusters seen per oil power field   Few Gram Variable Rods seen per oil power field   Specimen Source: .Sputum Sputum     Culture - Blood (20 @ 20:25)   Specimen Source: .Blood Blood-Peripheral   Culture Results:   No growth to date.     RADIOLOGY & ADDITIONAL STUDIES:    < from: Xray Chest 1 View- PORTABLE-Routine (Xray Chest 1 View- PORTABLE-Routine in AM.) (20 @ 07:28) >  IMPRESSION:  Stable small loculated right pleural effusion with likely associated passive atelectasis.    Minimal linear atelectasis at left base.    No pneumothorax.    < end of copied text >

## 2020-12-02 ENCOUNTER — TRANSCRIPTION ENCOUNTER (OUTPATIENT)
Age: 45
End: 2020-12-02

## 2020-12-02 VITALS
HEART RATE: 87 BPM | OXYGEN SATURATION: 98 % | TEMPERATURE: 98 F | DIASTOLIC BLOOD PRESSURE: 86 MMHG | SYSTOLIC BLOOD PRESSURE: 147 MMHG | RESPIRATION RATE: 18 BRPM

## 2020-12-02 LAB
ANION GAP SERPL CALC-SCNC: 13 MMO/L — SIGNIFICANT CHANGE UP (ref 7–14)
BUN SERPL-MCNC: 14 MG/DL — SIGNIFICANT CHANGE UP (ref 7–23)
CALCIUM SERPL-MCNC: 9.5 MG/DL — SIGNIFICANT CHANGE UP (ref 8.4–10.5)
CHLORIDE SERPL-SCNC: 99 MMOL/L — SIGNIFICANT CHANGE UP (ref 98–107)
CO2 SERPL-SCNC: 23 MMOL/L — SIGNIFICANT CHANGE UP (ref 22–31)
CREAT SERPL-MCNC: 0.78 MG/DL — SIGNIFICANT CHANGE UP (ref 0.5–1.3)
GLUCOSE SERPL-MCNC: 128 MG/DL — HIGH (ref 70–99)
HCT VFR BLD CALC: 37.8 % — LOW (ref 39–50)
HGB BLD-MCNC: 12.3 G/DL — LOW (ref 13–17)
MCHC RBC-ENTMCNC: 29.8 PG — SIGNIFICANT CHANGE UP (ref 27–34)
MCHC RBC-ENTMCNC: 32.5 % — SIGNIFICANT CHANGE UP (ref 32–36)
MCV RBC AUTO: 91.5 FL — SIGNIFICANT CHANGE UP (ref 80–100)
MRSA PCR RESULT.: SIGNIFICANT CHANGE UP
NRBC # FLD: 0 K/UL — SIGNIFICANT CHANGE UP (ref 0–0)
PLATELET # BLD AUTO: 456 K/UL — HIGH (ref 150–400)
PMV BLD: 9.8 FL — SIGNIFICANT CHANGE UP (ref 7–13)
POTASSIUM SERPL-MCNC: 4.1 MMOL/L — SIGNIFICANT CHANGE UP (ref 3.5–5.3)
POTASSIUM SERPL-SCNC: 4.1 MMOL/L — SIGNIFICANT CHANGE UP (ref 3.5–5.3)
RBC # BLD: 4.13 M/UL — LOW (ref 4.2–5.8)
RBC # FLD: 12.7 % — SIGNIFICANT CHANGE UP (ref 10.3–14.5)
S AUREUS DNA NOSE QL NAA+PROBE: NOT DETECTED — SIGNIFICANT CHANGE UP
SODIUM SERPL-SCNC: 135 MMOL/L — SIGNIFICANT CHANGE UP (ref 135–145)
WBC # BLD: 10.86 K/UL — HIGH (ref 3.8–10.5)
WBC # FLD AUTO: 10.86 K/UL — HIGH (ref 3.8–10.5)

## 2020-12-02 PROCEDURE — 71045 X-RAY EXAM CHEST 1 VIEW: CPT | Mod: 26

## 2020-12-02 RX ORDER — OXYCODONE HYDROCHLORIDE 5 MG/1
2 TABLET ORAL
Qty: 0 | Refills: 0 | DISCHARGE
Start: 2020-12-02

## 2020-12-02 RX ORDER — OXYCODONE HYDROCHLORIDE 5 MG/1
2 TABLET ORAL
Qty: 84 | Refills: 0
Start: 2020-12-02 | End: 2020-12-08

## 2020-12-02 RX ORDER — GABAPENTIN 400 MG/1
2 CAPSULE ORAL
Qty: 84 | Refills: 0
Start: 2020-12-02 | End: 2020-12-15

## 2020-12-02 RX ORDER — ACETAMINOPHEN 500 MG
2 TABLET ORAL
Qty: 0 | Refills: 0 | DISCHARGE
Start: 2020-12-02

## 2020-12-02 RX ORDER — GABAPENTIN 400 MG/1
2 CAPSULE ORAL
Qty: 0 | Refills: 0 | DISCHARGE
Start: 2020-12-02

## 2020-12-02 RX ORDER — METOPROLOL TARTRATE 50 MG
0.5 TABLET ORAL
Qty: 30 | Refills: 0
Start: 2020-12-02 | End: 2020-12-31

## 2020-12-02 RX ADMIN — Medication 650 MILLIGRAM(S): at 05:16

## 2020-12-02 RX ADMIN — Medication 650 MILLIGRAM(S): at 01:00

## 2020-12-02 RX ADMIN — Medication 12.5 MILLIGRAM(S): at 05:16

## 2020-12-02 RX ADMIN — GABAPENTIN 200 MILLIGRAM(S): 400 CAPSULE ORAL at 05:16

## 2020-12-02 RX ADMIN — PIPERACILLIN AND TAZOBACTAM 25 GRAM(S): 4; .5 INJECTION, POWDER, LYOPHILIZED, FOR SOLUTION INTRAVENOUS at 10:03

## 2020-12-02 RX ADMIN — PIPERACILLIN AND TAZOBACTAM 25 GRAM(S): 4; .5 INJECTION, POWDER, LYOPHILIZED, FOR SOLUTION INTRAVENOUS at 01:00

## 2020-12-02 RX ADMIN — HEPARIN SODIUM 5000 UNIT(S): 5000 INJECTION INTRAVENOUS; SUBCUTANEOUS at 05:16

## 2020-12-02 RX ADMIN — Medication 166.67 MILLIGRAM(S): at 05:15

## 2020-12-02 RX ADMIN — LIDOCAINE 1 PATCH: 4 CREAM TOPICAL at 05:15

## 2020-12-02 NOTE — DISCHARGE NOTE NURSING/CASE MANAGEMENT/SOCIAL WORK - NSDCFUADDAPPT_GEN_ALL_CORE_FT
See Dr. Amato in 7-10 days. Call to make an apt. [phone]  Have a chest xray prior to that appointment.   See Dr. Castro Infectious Disease in 3-4 weeks towards end of Antibiotic course. Call to make your apt.

## 2020-12-02 NOTE — DISCHARGE NOTE NURSING/CASE MANAGEMENT/SOCIAL WORK - PATIENT PORTAL LINK FT
You can access the FollowMyHealth Patient Portal offered by [facility] by registering at the following website: [email or web address]. By joining [employer]’s FollowMyHealth portal, you will also be able to view your health information using other applications (apps) compatible with our system.

## 2020-12-03 LAB
CULTURE RESULTS: SIGNIFICANT CHANGE UP
SPECIMEN SOURCE: SIGNIFICANT CHANGE UP

## 2020-12-05 LAB
CULTURE RESULTS: SIGNIFICANT CHANGE UP
SPECIMEN SOURCE: SIGNIFICANT CHANGE UP

## 2020-12-10 ENCOUNTER — APPOINTMENT (OUTPATIENT)
Dept: THORACIC SURGERY | Facility: CLINIC | Age: 45
End: 2020-12-10
Payer: COMMERCIAL

## 2020-12-10 VITALS
SYSTOLIC BLOOD PRESSURE: 107 MMHG | WEIGHT: 172 LBS | HEART RATE: 81 BPM | OXYGEN SATURATION: 98 % | TEMPERATURE: 98.1 F | DIASTOLIC BLOOD PRESSURE: 73 MMHG | HEIGHT: 64 IN | RESPIRATION RATE: 18 BRPM | BODY MASS INDEX: 29.37 KG/M2

## 2020-12-10 PROCEDURE — 99024 POSTOP FOLLOW-UP VISIT: CPT

## 2020-12-11 RX ORDER — METOPROLOL TARTRATE 25 MG/1
25 TABLET, FILM COATED ORAL
Qty: 30 | Refills: 0 | Status: COMPLETED | COMMUNITY
Start: 2020-12-02

## 2020-12-11 RX ORDER — SODIUM SULFATE, POTASSIUM SULFATE, MAGNESIUM SULFATE 17.5; 3.13; 1.6 G/ML; G/ML; G/ML
17.5-3.13-1.6 SOLUTION, CONCENTRATE ORAL
Qty: 354 | Refills: 0 | Status: COMPLETED | COMMUNITY
Start: 2020-11-13

## 2020-12-11 RX ORDER — LOSARTAN POTASSIUM 50 MG/1
50 TABLET, FILM COATED ORAL
Qty: 90 | Refills: 0 | Status: COMPLETED | COMMUNITY
Start: 2020-11-18

## 2020-12-11 RX ORDER — AMOXICILLIN AND CLAVULANATE POTASSIUM 875; 125 MG/1; 1/1
875-125 TABLET, FILM COATED ORAL
Refills: 0 | Status: COMPLETED | COMMUNITY

## 2020-12-11 RX ORDER — OXYCODONE 5 MG/1
5 TABLET ORAL
Qty: 84 | Refills: 0 | Status: COMPLETED | COMMUNITY
Start: 2020-12-02

## 2020-12-16 LAB
CULTURE RESULTS: SIGNIFICANT CHANGE UP
CULTURE RESULTS: SIGNIFICANT CHANGE UP
SPECIMEN SOURCE: SIGNIFICANT CHANGE UP
SPECIMEN SOURCE: SIGNIFICANT CHANGE UP

## 2021-01-16 LAB
CULTURE RESULTS: SIGNIFICANT CHANGE UP
SPECIMEN SOURCE: SIGNIFICANT CHANGE UP

## 2021-02-08 PROBLEM — R04.2 HEMOPTYSIS: Status: ACTIVE | Noted: 2020-11-06

## 2021-02-11 ENCOUNTER — APPOINTMENT (OUTPATIENT)
Dept: THORACIC SURGERY | Facility: CLINIC | Age: 46
End: 2021-02-11
Payer: COMMERCIAL

## 2021-02-11 VITALS
BODY MASS INDEX: 30.73 KG/M2 | TEMPERATURE: 97.7 F | OXYGEN SATURATION: 99 % | HEIGHT: 64 IN | HEART RATE: 75 BPM | DIASTOLIC BLOOD PRESSURE: 89 MMHG | WEIGHT: 180 LBS | SYSTOLIC BLOOD PRESSURE: 132 MMHG

## 2021-02-11 DIAGNOSIS — R04.2 HEMOPTYSIS: ICD-10-CM

## 2021-02-11 DIAGNOSIS — J18.9 PNEUMONIA, UNSPECIFIED ORGANISM: ICD-10-CM

## 2021-02-11 PROCEDURE — 99024 POSTOP FOLLOW-UP VISIT: CPT

## 2021-02-11 RX ORDER — CARVEDILOL 3.12 MG/1
3.12 TABLET, FILM COATED ORAL
Qty: 60 | Refills: 0 | Status: DISCONTINUED | COMMUNITY
Start: 2020-07-18 | End: 2021-02-11

## 2021-02-11 RX ORDER — DOXYCYCLINE HYCLATE 100 MG/1
100 TABLET ORAL
Qty: 10 | Refills: 0 | Status: DISCONTINUED | COMMUNITY
Start: 2020-12-02 | End: 2021-02-11

## 2021-02-11 RX ORDER — METOPROLOL TARTRATE 75 MG/1
75 TABLET, FILM COATED ORAL
Refills: 0 | Status: DISCONTINUED | COMMUNITY
End: 2021-02-11

## 2021-02-11 RX ORDER — GABAPENTIN 100 MG/1
100 CAPSULE ORAL
Qty: 84 | Refills: 0 | Status: DISCONTINUED | COMMUNITY
Start: 2020-12-02 | End: 2021-02-11

## 2021-02-11 RX ORDER — CLOTRIMAZOLE AND BETAMETHASONE DIPROPIONATE 10; .5 MG/G; MG/G
1-0.05 CREAM TOPICAL TWICE DAILY
Qty: 1 | Refills: 0 | Status: DISCONTINUED | COMMUNITY
Start: 2020-05-26 | End: 2021-02-11

## 2021-06-01 NOTE — ASU PATIENT PROFILE, ADULT - PMH
Date: 6/1/2021    Time: 0030    Patient Placed On BIPAP/CPAP/ Non-Invasive Ventilation? Yes    If no must comment. Facial area red/color change? No           If YES are Blister/Lesion present? No   If yes must notify nursing staff  BIPAP/CPAP skin barrier?   Yes    Skin barrier type:mepilexlite       Comments:    Red yoselyn Galloway RCP HTN (hypertension)    Hyperlipidemia    Obesity    VIDHI on CPAP    Psoriasis

## 2021-06-04 NOTE — ASU DISCHARGE PLAN (ADULT/PEDIATRIC) - PHYSICIAN SECTION COMPLETE
[facility] INPATIENT ENCOUNTER  HEMATOLOGY/ONCOLOGY DAILY PROGRESS NOTE    ADMISSION DATE:  6/1/2021  DATE:  6/4/2021  CURRENT HOSPITAL DAY:  Hospital Day: 4  ATTENDING PHYSICIAN:  Shell Matias MD  CODE STATUS:  Full Resuscitation    CHIEF COMPLAINT: DLBCL, inpatient chemotherapy with HD Methotrexate    ACTIVE PROBLEMS:  Patient Active Problem List   Diagnosis   • Coronary artery disease involving native coronary artery of native heart with angina pectoris (CMS/HCC)   • Tobacco use   • HTN (hypertension)   • Insomnia   • Hypothyroid   • SINGH (dyspnea on exertion)   • DLBCL (diffuse large B cell lymphoma) (CMS/HCC)   • Chemotherapy induced neutropenia (CMS/HCC)   • Chemoprophylaxis      ONCOLOG  DLBCL (diffuse large B cell lymphoma) (CMS/HCC)        Priority: Low   Presentation:             Patient developed a diffuse papular rash in November 2020. The papules were minimally tender rarely pruritic and mostly asymptomatic.  Over the last 2 months and particularly over the last 2 weeks the patient has noted a dramatic increase in the number and size of papules-see photographs from day of consultation.               The patient was evaluated by his primary care provider and subsequently referred to dermatology.             A punch biopsy of 1 of the lesions showed diffuse large B-cell lymphoma.             Patient seen in urgent consultation 01/05/2021:                        Initial labs-see flow sheet                        CT C/A/P (01/06/2021):  Diffuse adenopathy above and below the diaphragm.  Adenopathy is predominant in the pelvis.  There is no disproportionately large conglomeration of lymphadenopathy.                        PET-CT (01/12/2021):  Diffuse hypermetabolic lymphadenopathy above and below the diaphragm.  There is hypermetabolic splenic involvement by lymphoma.  There is hypermetabolic retroareolar soft tissue masses consistent with lymphomatous involvement.  There is hypermetabolic involvement  of the testicles.  There is hypermetabolic skin lesions consistent with cutaneous involvement.                        Testicular ultrasound 01/06/2021:  There are bilateral masses consistent with lymphomatous involvement                        Cardiac echo:  Adequate function to proceed with chemotherapy.     Initial Staging:             Stage IV DLBCL     Treatment:             R-CHOP ([id number])                                   Rituxan 375 mg/m2 IV (intravenously) day 1                                   Cytoxan 750 mg/m2 IV day 1                                   Adriamycin 50 mg/m2 IV day 1                                                     Vincristine 2 mg IV day 1                                   Prednisone 100 mg PO daily days 1-5                                   Note:  Patient will also receive inpatient high-dose methotrexate after cycles 2 (done), 4 and 6 for CNS prophylaxis.                                              Cycle #1=1/26/2021; #2=2/16/2021; #3=3/9/2021     Pathology:  1. Scalp nodule 4 mm punch biopsy (12/05/2020) reported 12/14/2020.  [facility] dermatopathology report [id number]:  Dermal diffuse and nodular infiltrate of larger atypical B lymphocytes with features consistent with diffuse large B-cell lymphoma.  IHC:  CD20-positive; BCL-2 positive, MUM-1-positive; Ki-67-90% positive; QE14-idutvznf; YS06-qhczynaq; cyclin D1-negative, and CD 21-negative.    2. Lymph node excisional biopsy (01/22/2021):  diffuse large B-cell lymphoma nongerminal cell type, negative for MYC gene rearrangement by FISH.         INTERVAL HISTORY:  Seth Ledesma is a 56 year old male patient with a past medical history significant for DLBCL.  The patient was initiated on treatment with RCHOP on 1/26/21. HD methotrexate was added on day 15 of every other cycle beginning on 3/2/21. The patient has been receiving Neulasta Onpro. He was most recently admitted for his 3rd cycle of HD Methotrexate (cycle 6  day 15) on 6/1/21. Most recent methotrexate level completed on 6/2/21 at about 6pm was 3.13.     Today Seth Ledesma reports feeling.well but with worsening mouth soreness today but denies actual mouth sores. He started on magic mouthwash yesterday. He denies any fevers or chills. He reports a decent appetite. He had nausea last night while eating dinner but denies any vomiting, abdominal pain or blood in stool. He hasn't had a bowel movement in 2 days, which he states is normal for him. He declines stool softener.  He denies any muscle cramps since yesterday.   He denies any urinary complaints, including gross hematuria or dysuria. His urinary pH is elevated appropriately today.    The patient notes dry cough seems to be improving while in the hospital.  He denies any shortness of breath at rest but does have dyspnea on exertion and some dizziness when coughing. He denies chest pain.  He denies unusual headaches.  He has intermittent worsening numbness/tingling in the fingers. He denies any bleeding diathesis including epistaxis, blood in stool, hematuria, hematemesis, hemoptysis, or gingival bleeding. He denies blurry vision at this time but has had it off and on in last 24 hours.       MEDICATIONS / ALLERGIES:  Current Facility-Administered Medications   Medication Dose Route Frequency Provider Last Rate Last Admin   • alum-mag hydroxide+simethicone/lidocaine viscous (2:1) (MAGIC MOUTHWASH/GI COCKTAIL) (compounded) oral suspension 15 mL  15 mL Oral Q2H PRN Theodora Bull PA-C       • leucovorin (WELLCOVORIN) tablet 25 mg  25 mg Oral Q6H Shell Matias MD   25 mg at 06/04/21 0619   • HYDROcodone-acetaminophen (NORCO) 5-325 MG per tablet 1 tablet  1 tablet Oral Q6H PRN Shell Matias MD       • allopurinol (ZYLOPRIM) tablet 300 mg  300 mg Oral Q Evening Shell Matias MD   300 mg at 06/03/21 1740   • levothyroxine (SYNTHROID, LEVOTHROID) tablet 175 mcg  175 mcg Oral QAM AC Shell Matias  MD   175 mcg at 06/04/21 0619   • metoPROLOL succinate (TOPROL-XL) ER tablet 25 mg  25 mg Oral Q Evening Shell Matias MD   25 mg at 06/03/21 1740   • prochlorperazine (COMPAZINE) tablet 10 mg  10 mg Oral Q6H PRN Shell Matias MD       • ticagrelor (BRILINTA) tablet 90 mg  90 mg Oral 2 times per day Shell Matias MD   90 mg at 06/04/21 0916   • traZODone (DESYREL) tablet 50 mg  50 mg Oral Nightly Shell Matias MD   50 mg at 06/03/21 2037   • varenicline (CHANTIX) tablet 1 mg  1 mg Oral Q Evening Shell Matias MD   1 mg at 06/03/21 1741   • sodium chloride (NORMAL SALINE) 0.9 % bolus 500 mL  500 mL Intravenous PRN Shell Matias MD       • sodium chloride 0.9 % flush bag 25 mL  25 mL Intravenous PRN Shell Matias MD       • sodium chloride (PF) 0.9 % injection 2 mL  2 mL Intracatheter 2 times per day Shell Matias MD   2 mL at 06/03/21 0751   • sodium chloride 0.9 % flush bag 25 mL  25 mL Intravenous PRN Shell Matias MD       • heparin (porcine) injection 5,000 Units  5,000 Units Subcutaneous 3 times per day Shell Matias MD       • Potassium Standard Replacement Protocol   Does not apply See Admin Instructions Shell Matias MD       • Magnesium Standard Replacement Protocol   Does not apply See Admin Instructions Shell Matias MD       • ondansetron (ZOFRAN) injection 4 mg  4 mg Intravenous BID PRN Shell Matias MD       • acetaminophen (TYLENOL) tablet 650 mg  650 mg Oral Q4H PRN Shell Matias MD       • sodium chloride 0.9 % flush bag 250 mL  250 mL Intravenous Once PRN Luciano Rodriguez MD       • sodium bicarbonate 8.4 % 100 mEq in dextrose 5 % 1,000 mL infusion   Intravenous Continuous Luciano Rodriguez  mL/hr at 06/04/21 0801 Rate Verify at 06/04/21 0801   • sodium bicarbonate 8.4 % injection 50 mEq  50 mEq Intravenous PRN Luciano Rodriguez MD   50 mEq at 06/01/21 1335   • sodium chloride (NORMAL SALINE)  0.9 % bolus 1,000 mL  1,000 mL Intravenous Once PRN Luciano Rodriguez MD       • sodium chloride (NORMAL SALINE) 0.9 % bolus 1,000 mL  1,000 mL Intravenous Once PRN Luciano Rodriguez MD       • EPINEPHrine (ADRENALIN) injection 0.3 mg  0.3 mg Intramuscular Q5 Min PRN Luciano Rodriguez MD       • diphenhydrAMINE (BENADRYL) injection 50 mg  50 mg Intravenous Once PRN Luciano Rodriguez MD       • famotidine (PEPCID) injection 20 mg  20 mg Intravenous Once PRN Luciano Rodriguez MD       • methylPREDNISolone (SOLU-Medrol) PF injection 125 mg  125 mg Intravenous Once PRN Luciano Rodriguez MD       • albuterol inhaler 2 puff  2 puff Inhalation Q20 Min PRN Luciano Rodriguez MD       • albuterol (VENTOLIN) nebulizer 5 mg  5 mg Nebulization Q20 Min PRN Luciano Rodriguez MD       • diphenhydrAMINE (BENADRYL) capsule 25 mg  25 mg Oral Nightly PRN Shell Matias MD       • lidocaine viscous 2 % oral solution 15 mL  15 mL Swish & Swallow PRN Shell Matias MD   15 mL at 06/04/21 0916   • nystatin (MYCOSTATIN) 358747 UNIT/ML suspension 500,000 Units  500,000 Units Swish & Swallow PRN Shell Matias MD   500,000 Units at 06/04/21 0916     ALLERGIES:   Allergen Reactions   • Codeine NAUSEA   • [place] Other (See Comments)     Watery eyes       HISTORIES:  I have reviewed the past medical history, family history, and social history listed in the medical record as obtained by my nursing staff and support staff and agree with their documentation.    REVIEW OF SYSTEMS:  All other systems are reviewed and are negative except as documented in the history of present illness.    OBJECTIVE:  VITAL SIGNS:  Vital Last Value 24 Hour Range   Temperature 98 °F (36.7 °C) (06/04/21 0425) Temp  Min: 97.4 °F (36.3 °C)  Max: 98.2 °F (36.8 °C)   Pulse (!) 56 (06/04/21 0425) Pulse  Min: 56  Max: 74   Respiratory 16 (06/04/21 0425) Resp  Min: 16  Max: 16   Non-Invasive  Blood Pressure 105/52 (06/04/21 0425) BP  Min: 105/52   Max: 145/67   Pulse Oximetry 95 % (06/04/21 0425) SpO2  Min: 95 %  Max: 98 %     Vital Today Admitted   Weight 102.1 kg (06/03/21 0421) Weight: 100.9 kg (06/01/21 0850)   Height N/A Height: 6' (182.9 cm) (06/01/21 0850)   BMI N/A BMI (Calculated): 30.17 (06/01/21 0850)     INTAKE/OUTPUT:  I/O last 3 completed shifts:  In: 240 [P.O.:240]  Out: 1225 [Urine:1225]    PHYSICAL EXAM:  General: The patient is alert, well-developed, well-nourished, no distress.  Skin: Warm, normal color, normal texture, normal turgor and without rash.  Head: Normocephalic, atraumatic.  Neck: Supple with no significant adenopathy.  Eyes: Normal conjunctivae and sclerae. Pupils equal, round, reactive to light and accommodation. Extraocular movements intact.  ENT: Mucous membranes are moist. Normal external nose, no obvious mouth sores at this time, no stomatitis. No thrush.   Cardiovascular: Regular rate and rhythm, no murmurs, gallops or rubs.  Respiratory: Normal respiratory effort. Clear to auscultation. No wheezes, rales, or rhonchi.  Abdomen: Abdomen is soft and non-tender with active bowel sounds. There is no detected hepatosplenomegaly, masses, or ascites.  Extremities: No clubbing, no cyanosis, trace edema equal in the bilateral lower extremties. Normal muscle tone and development bilaterally.  Lymph: No cervical, supraclavicular lymphadenopathy.  Neurologic: Motor strength normal. Coordination normal. No tremor noted.  Psychiatric: Cooperative. Appropriate mood and affect. Normal judgment.    LABORATORY DATA:  Recent Labs   Lab 06/04/21 0618   WBC 4.3   RBC 2.85*   HGB 9.3*   HCT 28.4*   MCV 99.6      ANEUT 3.2   ALYMS 0.9*   JANAE 0.2*   AEOS 0.0   ABASO 0.1     Recent Labs   Lab 06/04/21  0618 06/01/21  1000   GLUCOSE 113* 135*   SODIUM 142 142   POTASSIUM 3.8 3.9   CHLORIDE 106 106   CO2 36* 26   BUN 15 16   CREATININE 1.10 0.90   CALCIUM 8.0* 8.6   MG  --  1.8   TOTPROTEIN 5.0* 6.2*   ALBUMIN 2.6* 3.2*   AST 20 16   ALKPT  59 85   GPT 45 27     Recent Labs   Lab 06/04/21  0618 04/26/21  0843   Anion Gap 4* 15   Globulin 2.4 3.5   LD, Total  --  213   Bilirubin, Total 0.2 0.3       IMAGING STUDIES:  None     ASSESSMENT/PLAN:  1. DLBCL: currently admitted for 3rd round of HDMTX, which is being given every other cycle along with RCHOP. This is cycle 6 day 18 today. Methotrexate level completed every 24 hours was at 0.23 last night. Will repeat again at 11am to see if low enough for discharge this evening. Level will need to be less than 0.1micromol/L before patient can be safely discharged. Plan to repeat PET scan on 6/22/21 as scheduled and then follow-up with Dr. Rodriguez as an outpatient thereafter to determine subsequent treatment/follow-up plan. Currently this is the last cycle of chemotherapy planned. Leucovorin 25mg PO q 6 hours for 4 doses upon discharge. Script sent to [facility]   2. Normocytic anemia: Hgb today of 9.3. Based on today's counts, the hemoglobin is not low enough to compromise end organ perfusion, and therefore,  PRBC transfusion is not needed. Continue to monitor. No intervention required at this time.   3. Mouth soreness: continue nystatin and lidocaine/maalox rinse prn oral pain. Will send refill for magic mouthwash to outpatient pharmacy.   4. Blurry vision: likely due to chemotherapy. Recommended ophthalmologic evaluation in the next 4-6 weeks if problem is persistent despite completion of chemotherapy.       The patient indicated understanding of the diagnosis and agreed with the current plan of care.  All questions were answered to the best of my ability.  The patient was encouraged to call with any interval questions or concerns. Thank you for allowing me to participate in the care of this patient.          Theodora Bull PA-C  Supervising physician: Dr. Luciano Rodriguez   Hematology/Oncology   [facility]     Yes

## 2021-07-06 ENCOUNTER — APPOINTMENT (OUTPATIENT)
Dept: UROLOGY | Facility: CLINIC | Age: 46
End: 2021-07-06

## 2021-09-28 ENCOUNTER — EMERGENCY (EMERGENCY)
Facility: HOSPITAL | Age: 46
LOS: 1 days | Discharge: HOME | End: 2021-09-28
Attending: STUDENT IN AN ORGANIZED HEALTH CARE EDUCATION/TRAINING PROGRAM | Admitting: STUDENT IN AN ORGANIZED HEALTH CARE EDUCATION/TRAINING PROGRAM
Payer: COMMERCIAL

## 2021-09-28 VITALS
TEMPERATURE: 100 F | DIASTOLIC BLOOD PRESSURE: 60 MMHG | HEART RATE: 120 BPM | OXYGEN SATURATION: 97 % | SYSTOLIC BLOOD PRESSURE: 130 MMHG | RESPIRATION RATE: 22 BRPM | HEIGHT: 66 IN

## 2021-09-28 VITALS — DIASTOLIC BLOOD PRESSURE: 90 MMHG | HEART RATE: 92 BPM | TEMPERATURE: 99 F | SYSTOLIC BLOOD PRESSURE: 112 MMHG

## 2021-09-28 DIAGNOSIS — Z98.890 OTHER SPECIFIED POSTPROCEDURAL STATES: Chronic | ICD-10-CM

## 2021-09-28 LAB
ALBUMIN SERPL ELPH-MCNC: 4.9 G/DL — SIGNIFICANT CHANGE UP (ref 3.3–5)
ALP SERPL-CCNC: 77 U/L — SIGNIFICANT CHANGE UP (ref 40–120)
ALT FLD-CCNC: 92 U/L — HIGH (ref 4–41)
ANION GAP SERPL CALC-SCNC: 14 MMOL/L — SIGNIFICANT CHANGE UP (ref 7–14)
APPEARANCE UR: CLEAR — SIGNIFICANT CHANGE UP
APTT BLD: 31.9 SEC — SIGNIFICANT CHANGE UP (ref 27–36.3)
AST SERPL-CCNC: 63 U/L — HIGH (ref 4–40)
B PERT DNA SPEC QL NAA+PROBE: SIGNIFICANT CHANGE UP
B PERT+PARAPERT DNA PNL SPEC NAA+PROBE: SIGNIFICANT CHANGE UP
BASOPHILS # BLD AUTO: 0.02 K/UL — SIGNIFICANT CHANGE UP (ref 0–0.2)
BASOPHILS NFR BLD AUTO: 0.2 % — SIGNIFICANT CHANGE UP (ref 0–2)
BILIRUB SERPL-MCNC: 0.5 MG/DL — SIGNIFICANT CHANGE UP (ref 0.2–1.2)
BILIRUB UR-MCNC: NEGATIVE — SIGNIFICANT CHANGE UP
BORDETELLA PARAPERTUSSIS (RAPRVP): SIGNIFICANT CHANGE UP
BUN SERPL-MCNC: 14 MG/DL — SIGNIFICANT CHANGE UP (ref 7–23)
C PNEUM DNA SPEC QL NAA+PROBE: SIGNIFICANT CHANGE UP
CALCIUM SERPL-MCNC: 9.5 MG/DL — SIGNIFICANT CHANGE UP (ref 8.4–10.5)
CHLORIDE SERPL-SCNC: 100 MMOL/L — SIGNIFICANT CHANGE UP (ref 98–107)
CO2 SERPL-SCNC: 22 MMOL/L — SIGNIFICANT CHANGE UP (ref 22–31)
COLOR SPEC: SIGNIFICANT CHANGE UP
CREAT SERPL-MCNC: 1.06 MG/DL — SIGNIFICANT CHANGE UP (ref 0.5–1.3)
DIFF PNL FLD: ABNORMAL
EOSINOPHIL # BLD AUTO: 0.01 K/UL — SIGNIFICANT CHANGE UP (ref 0–0.5)
EOSINOPHIL NFR BLD AUTO: 0.1 % — SIGNIFICANT CHANGE UP (ref 0–6)
FLUAV SUBTYP SPEC NAA+PROBE: SIGNIFICANT CHANGE UP
FLUBV RNA SPEC QL NAA+PROBE: SIGNIFICANT CHANGE UP
GLUCOSE SERPL-MCNC: 141 MG/DL — HIGH (ref 70–99)
GLUCOSE UR QL: NEGATIVE — SIGNIFICANT CHANGE UP
HADV DNA SPEC QL NAA+PROBE: SIGNIFICANT CHANGE UP
HCOV 229E RNA SPEC QL NAA+PROBE: SIGNIFICANT CHANGE UP
HCOV HKU1 RNA SPEC QL NAA+PROBE: SIGNIFICANT CHANGE UP
HCOV NL63 RNA SPEC QL NAA+PROBE: SIGNIFICANT CHANGE UP
HCOV OC43 RNA SPEC QL NAA+PROBE: SIGNIFICANT CHANGE UP
HCT VFR BLD CALC: 47.1 % — SIGNIFICANT CHANGE UP (ref 39–50)
HGB BLD-MCNC: 15.9 G/DL — SIGNIFICANT CHANGE UP (ref 13–17)
HMPV RNA SPEC QL NAA+PROBE: SIGNIFICANT CHANGE UP
HPIV1 RNA SPEC QL NAA+PROBE: SIGNIFICANT CHANGE UP
HPIV2 RNA SPEC QL NAA+PROBE: SIGNIFICANT CHANGE UP
HPIV3 RNA SPEC QL NAA+PROBE: SIGNIFICANT CHANGE UP
HPIV4 RNA SPEC QL NAA+PROBE: SIGNIFICANT CHANGE UP
IANC: 6.73 K/UL — SIGNIFICANT CHANGE UP (ref 1.5–8.5)
IMM GRANULOCYTES NFR BLD AUTO: 0.5 % — SIGNIFICANT CHANGE UP (ref 0–1.5)
INR BLD: 1.06 RATIO — SIGNIFICANT CHANGE UP (ref 0.88–1.16)
KETONES UR-MCNC: NEGATIVE — SIGNIFICANT CHANGE UP
LACTATE BLDV-MCNC: 2.1 MMOL/L — HIGH (ref 0.5–2)
LEUKOCYTE ESTERASE UR-ACNC: NEGATIVE — SIGNIFICANT CHANGE UP
LYMPHOCYTES # BLD AUTO: 0.78 K/UL — LOW (ref 1–3.3)
LYMPHOCYTES # BLD AUTO: 9.4 % — LOW (ref 13–44)
M PNEUMO DNA SPEC QL NAA+PROBE: SIGNIFICANT CHANGE UP
MCHC RBC-ENTMCNC: 29.8 PG — SIGNIFICANT CHANGE UP (ref 27–34)
MCHC RBC-ENTMCNC: 33.8 GM/DL — SIGNIFICANT CHANGE UP (ref 32–36)
MCV RBC AUTO: 88.4 FL — SIGNIFICANT CHANGE UP (ref 80–100)
MONOCYTES # BLD AUTO: 0.71 K/UL — SIGNIFICANT CHANGE UP (ref 0–0.9)
MONOCYTES NFR BLD AUTO: 8.6 % — SIGNIFICANT CHANGE UP (ref 2–14)
NEUTROPHILS # BLD AUTO: 6.73 K/UL — SIGNIFICANT CHANGE UP (ref 1.8–7.4)
NEUTROPHILS NFR BLD AUTO: 81.2 % — HIGH (ref 43–77)
NITRITE UR-MCNC: NEGATIVE — SIGNIFICANT CHANGE UP
NRBC # BLD: 0 /100 WBCS — SIGNIFICANT CHANGE UP
NRBC # FLD: 0 K/UL — SIGNIFICANT CHANGE UP
PH UR: 6 — SIGNIFICANT CHANGE UP (ref 5–8)
PLATELET # BLD AUTO: 193 K/UL — SIGNIFICANT CHANGE UP (ref 150–400)
POTASSIUM SERPL-MCNC: 3.7 MMOL/L — SIGNIFICANT CHANGE UP (ref 3.5–5.3)
POTASSIUM SERPL-SCNC: 3.7 MMOL/L — SIGNIFICANT CHANGE UP (ref 3.5–5.3)
PROT SERPL-MCNC: 7.8 G/DL — SIGNIFICANT CHANGE UP (ref 6–8.3)
PROT UR-MCNC: NEGATIVE — SIGNIFICANT CHANGE UP
PROTHROM AB SERPL-ACNC: 12.1 SEC — SIGNIFICANT CHANGE UP (ref 10.6–13.6)
RAPID RVP RESULT: DETECTED
RBC # BLD: 5.33 M/UL — SIGNIFICANT CHANGE UP (ref 4.2–5.8)
RBC # FLD: 12.7 % — SIGNIFICANT CHANGE UP (ref 10.3–14.5)
RSV RNA SPEC QL NAA+PROBE: SIGNIFICANT CHANGE UP
RV+EV RNA SPEC QL NAA+PROBE: SIGNIFICANT CHANGE UP
SARS-COV-2 RNA SPEC QL NAA+PROBE: DETECTED
SODIUM SERPL-SCNC: 136 MMOL/L — SIGNIFICANT CHANGE UP (ref 135–145)
SP GR SPEC: 1.01 — SIGNIFICANT CHANGE UP (ref 1–1.05)
UROBILINOGEN FLD QL: SIGNIFICANT CHANGE UP
WBC # BLD: 8.29 K/UL — SIGNIFICANT CHANGE UP (ref 3.8–10.5)
WBC # FLD AUTO: 8.29 K/UL — SIGNIFICANT CHANGE UP (ref 3.8–10.5)

## 2021-09-28 PROCEDURE — 71045 X-RAY EXAM CHEST 1 VIEW: CPT | Mod: 26

## 2021-09-28 PROCEDURE — 71260 CT THORAX DX C+: CPT | Mod: 26

## 2021-09-28 PROCEDURE — 99285 EMERGENCY DEPT VISIT HI MDM: CPT

## 2021-09-28 RX ORDER — SODIUM CHLORIDE 9 MG/ML
1000 INJECTION INTRAMUSCULAR; INTRAVENOUS; SUBCUTANEOUS ONCE
Refills: 0 | Status: COMPLETED | OUTPATIENT
Start: 2021-09-28 | End: 2021-09-28

## 2021-09-28 RX ORDER — ACETAMINOPHEN 500 MG
975 TABLET ORAL ONCE
Refills: 0 | Status: COMPLETED | OUTPATIENT
Start: 2021-09-28 | End: 2021-09-28

## 2021-09-28 RX ORDER — AZITHROMYCIN 500 MG/1
500 TABLET, FILM COATED ORAL ONCE
Refills: 0 | Status: COMPLETED | OUTPATIENT
Start: 2021-09-28 | End: 2021-09-28

## 2021-09-28 RX ORDER — CEFTRIAXONE 500 MG/1
1000 INJECTION, POWDER, FOR SOLUTION INTRAMUSCULAR; INTRAVENOUS ONCE
Refills: 0 | Status: COMPLETED | OUTPATIENT
Start: 2021-09-28 | End: 2021-09-28

## 2021-09-28 RX ORDER — IBUPROFEN 200 MG
400 TABLET ORAL ONCE
Refills: 0 | Status: COMPLETED | OUTPATIENT
Start: 2021-09-28 | End: 2021-09-28

## 2021-09-28 RX ADMIN — SODIUM CHLORIDE 1000 MILLILITER(S): 9 INJECTION INTRAMUSCULAR; INTRAVENOUS; SUBCUTANEOUS at 11:44

## 2021-09-28 RX ADMIN — Medication 975 MILLIGRAM(S): at 14:19

## 2021-09-28 RX ADMIN — AZITHROMYCIN 255 MILLIGRAM(S): 500 TABLET, FILM COATED ORAL at 11:44

## 2021-09-28 RX ADMIN — AZITHROMYCIN 500 MILLIGRAM(S): 500 TABLET, FILM COATED ORAL at 14:19

## 2021-09-28 RX ADMIN — CEFTRIAXONE 1000 MILLIGRAM(S): 500 INJECTION, POWDER, FOR SOLUTION INTRAMUSCULAR; INTRAVENOUS at 14:20

## 2021-09-28 RX ADMIN — Medication 975 MILLIGRAM(S): at 11:44

## 2021-09-28 RX ADMIN — SODIUM CHLORIDE 1000 MILLILITER(S): 9 INJECTION INTRAMUSCULAR; INTRAVENOUS; SUBCUTANEOUS at 14:20

## 2021-09-28 RX ADMIN — CEFTRIAXONE 100 MILLIGRAM(S): 500 INJECTION, POWDER, FOR SOLUTION INTRAMUSCULAR; INTRAVENOUS at 13:18

## 2021-09-28 RX ADMIN — Medication 400 MILLIGRAM(S): at 14:25

## 2021-09-28 NOTE — ED PROVIDER NOTE - ATTENDING CONTRIBUTION TO CARE
Delvin Marcano DO:  patient seen and evaluated with the resident.  I was present for key portions of the History & Physical, and I agree with the Impression & Plan. 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 reports one day of sx. fever controlled w/ apap. reports increased cough from baseline. mild pain to surgical site. denies exer sx. denies sick contacts, recent travel. s/p pfizer covid vax in march. arrives tachy, pt notes during hospital stay had sinus tach, unknown etiology. no hypoxia. coarse breath sounds b/l. will treat for CAP. check labs, possible ct chest if cxr non revealing. reassess.

## 2021-09-28 NOTE — ED ADULT NURSE NOTE - OBJECTIVE STATEMENT
Pt A+OX4 c/o right sided chest pain, fever, non-productive cough, and SOB since yesterday.  Pt endorses Tmax 100.4.  States a history of multiple pneumonias due to a past aspiration.  Labs obtained and sent as ordered.  #20g SL L AC placed.  CM placed.

## 2021-09-28 NOTE — ED ADULT TRIAGE NOTE - CHIEF COMPLAINT QUOTE
p/t c/o of rt sided pleural pain and fever for few days, p/t s/p rt mid lung lobectomy few month ago,

## 2021-09-28 NOTE — ED PROVIDER NOTE - PHYSICAL EXAMINATION
Gen: NAD, non-toxic appearing  Head: normal appearing  HEENT: normal conjunctiva, oral mucosa moist  Lung: no respiratory distress, speaking in full sentences, CTA b/l w/ reduced lung sounds on the R  CV: rapid rate and regular rhythm, no murmurs  Abd: soft, non distended, non tender   MSK: no visible deformities  Neuro: No focal deficits, Alert and grossly oriented  Skin: Warm. Thoracotomy scar to the R, well healed scar w/out associated signs of inflammation.   Psych: normal affect

## 2021-09-28 NOTE — ED PROVIDER NOTE - CLINICAL SUMMARY MEDICAL DECISION MAKING FREE TEXT BOX
44 yo male w/ a hx of Kleb pnx and RLL lobectomy for pathological changes following aspiration event presents w/ fever, chills and cough. Tachycardia here. Non-toxic appearing. Concern for pnx in a pt w/ a hx of same. Assess for sepsis. Low suspicion for cellulitis, UTI, meningitis or other primary source of infection. Endorsed chest pain at triage, r/out ACS, obtain troponin and ecg.     Plan = iv insert, cardiac monitor, routine vitals  UA, CXR, VBG w/ Lactate, CBC, BCx, CMP, Mg, Phosph   IVF  Pain Control: Declined   Empiric coverage for CAP (ceftriaxone and azithro) 46 yo male w/ a hx of Kleb pnx and RLL lobectomy for pathological changes following aspiration event presents w/ fever, chills and cough. Tachycardia here. Non-toxic appearing. Concern for pnx in a pt w/ a hx of same. Assess for sepsis. Assess for covid, other viral syndrome. Low suspicion for cellulitis, UTI, meningitis or other primary source of infection. Endorsed chest pain at triage, r/out ACS, obtain troponin and ecg.     Plan = iv insert, cardiac monitor, routine vitals  UA, CXR, VBG w/ Lactate, CBC, BCx, CMP, Mg, Phosph, RVP   IVF  Pain Control: Declined   Empiric coverage for CAP (ceftriaxone and azithro)

## 2021-09-28 NOTE — ED PROVIDER NOTE - NS ED ROS FT
GENERAL: positive for fever and chills  EYES: no eye pain   HEENT: no neck pain   CARDIAC: no chest pain  PULMONARY: positive for cough, no SOB  GI: no abdominal pain  : no dysuria  SKIN: no rashes  NEURO: no headache  MSK: no new joint pain

## 2021-09-28 NOTE — ED PROVIDER NOTE - OBJECTIVE STATEMENT
This is a 44 yo M who presents w/ fever.   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   Associated symptoms: Positive for cough. Negative for ear pain, ongoing sore throat, HA, chest pain, SOB, abdominal pain, dysuria, rash, wound (SOB was endorsed in the triage note, but the patient denied this during my evaluation)  Denies recent hospitalization, surgeries or travel. No recent sick contacts. No indwelling medical device. No hx of imm deficiency or immunomodulating therapy. No hx of MRSA infection. No hx of IVDA. Fully vaccinated vs covid w/ pfizer.

## 2021-09-28 NOTE — ED ADULT NURSE NOTE - NSIMPLEMENTINTERV_GEN_ALL_ED
Implemented All Fall Risk Interventions:  [place] to call system. Call bell, personal items and telephone within reach. Instruct patient to call for assistance. Room bathroom lighting operational. Non-slip footwear when patient is off stretcher. Physically safe environment: no spills, clutter or unnecessary equipment. Stretcher in lowest position, wheels locked, appropriate side rails in place. Provide visual cue, wrist band, yellow gown, etc. Monitor gait and stability. Monitor for mental status changes and reorient to person, place, and time. Review medications for side effects contributing to fall risk. Reinforce activity limits and safety measures with patient and family.

## 2021-09-28 NOTE — ED PROVIDER NOTE - PATIENT PORTAL LINK FT
You can access the FollowMyHealth Patient Portal offered by [facility] by registering at the following website: [email or web address]. By joining [employer]’s FollowMyHealth portal, you will also be able to view your health information using other applications (apps) compatible with our system.

## 2021-09-28 NOTE — ED ADULT NURSE NOTE - NSFALLRSKINDICATORS_ED_ALL_ED
baby isn't drinking enough.
no

## 2021-09-28 NOTE — ED PROVIDER NOTE - NSFOLLOWUPINSTRUCTIONS_ED_ALL_ED_FT
For a 12 day period:  -Stay inside your home as much as possible, avoiding public places or public interaction  -Do not go to work  -If you do enter any public domain, at minimum wear a surgical mask at all times  -Even while indoors, attempt to remain isolated from other individuals such as family or friends, as much as possible  -Return to the Emergency Department for any symptoms such as worsening shortness of breath, significant worsening cough, high fevers despite over the counter fever-reducing medications, or severe weakness/malaise  -you received verbal instructions and did not sign because of the risk of infection, and expressed understanding of the discharge instructions.  -please practice good hand hygiene and social distancing  -you can call [phone] for any Covid related questions or your local department of health. ([facility] Dept of Health [phone], or [facility])

## 2021-09-28 NOTE — ED ADULT TRIAGE NOTE - IDEAL BODY WEIGHT(KG)
[address] [address]    Phone:  [phone]                                       After Visit Summary   10/20/2017    Roberto Spence    MRN: [id number]           After Visit Summary Signature Page     I have received my discharge instructions, and my questions have been answered. I have discussed any challenges I see with this plan with the nurse or doctor.    ..........................................................................................................................................  Patient/Patient Representative Signature      ..........................................................................................................................................  Patient Representative Print Name and Relationship to Patient    ..................................................               ................................................  Date                                            Time    ..........................................................................................................................................  Reviewed by Signature/Title    ...................................................              ..............................................  Date                                                            Time           64

## 2021-09-28 NOTE — ED PROVIDER NOTE - PROGRESS NOTE DETAILS
[place] PGY3: Patient reevaluated and feeling better. Reviewed and discussed results with patient. Discussed importance of follow up and return precautions. Patient agrees with plan.

## 2021-09-29 LAB
CULTURE RESULTS: NO GROWTH — SIGNIFICANT CHANGE UP
SPECIMEN SOURCE: SIGNIFICANT CHANGE UP

## 2021-11-22 NOTE — REASON FOR VISIT
Diagnosis: Infiltrating lobular carcinoma (CMS/HCC)  Regimen: Abraxane  Cycle/Day: D1C1- Resumed  Is this a C1D1 appt?  No     Pt received 1LNS + 20meq KCl for K of 3.1.    Dr. Crispin Bryant is supervising clinician today.    ECOG:   ECOG [21 0924]   ECOG Performance Status 2       Review and verified Advanced Directives: No, patient has no interest in completing Advanced Directives at this time    Verified if patient has state DNR bracelet on: No; Full Code    Nursing Assessment:   A focused nursing assessment addressing the toxicity of chemotherapy was performed and the patient reports the following:  Nausea: NO  Vomiting: NO  Fever: NO  Chills: NO  Other signs of infection: NO  Bleeding: NO  Mucositis: NO  Diarrhea: YES, Chronic  Constipation: NO  Anorexia: NO  Dysuria: NO  Urinary Bleeding: NO  Cough: NO  Shortness of Breath: YES  Fatigue/Weakness: YES  Numbness/Tingling: NO  Other Neuropathies: NO  Edema: NO  Rash: NO  Hand/Foot Syndrome: NO  Anxiety/Depression/Insomnia: YES  Pain: NO         Pre-Treatment: Patient has valid pre-authorization  VS completed  Height and weight verified  BSA independently double checked & verified by two practitioners  Premed orders, including hydration, are verified prior to administration  Treatment parameters verified in patient protocol  Chemotherapy doses independently doubled checked & verified by two practitioners  I have reviewed the following with the patient:  Name of chemo drug, duration and route of infusion, and reportable infusion-related symptoms.    Treatment: Refer to LDA and MAR for line assessment and medication administration  Chemotherapy has not ; double checked & verified by two practitioners  Appearance and physical integrity of drugs meets standard of drug monograph; double checked & verified by two practitioners  Rate set on infusion pump is in alignment with ordered rate; double checked & verified by two practitioners  Blood return  confirmed before, during and after treatment administered  Infusion pump used for non-vesicant drugs  SubQ/IM Injection: See injection record for documentation  Needle Size: 25 g. 1\" and B12 injection, IM right deltoid    Post Treatment: Treatment tolerated well; no adverse reaction    Does the Patient have a central line?  yes:   Device: Port  Central Line Site: Right  and Chest  Central Line Site Appearance: WDL  Is this a port? Yes: Implanted port accessed using a 20 gauge non-coring needle primed with 0.9% sodium chloride. Good blood return obtained.  Blood obtained and sent to lab.  Site Care: Aseptic site care per protocol, Occlusive Transparent Dressing and Biopatch  Comments:   Central line flushed with: 10 ml 0.9 normal saline, 20 ml 0.9 normal saline and 100 un/ml- 500 units heparin  Central line removed: no  Dunn Needle: Dunn needle left in place      Transfusion: Not needed    Integrative Medicine: No    Oral Chemotherapy: No    Education: No new instructions needed    Next appointment scheduled: 2 days  Patient instructed to call the office with any questions or concerns.    Patient Discharged: patient discharged to home per self, ambulatory   [Follow-Up: _____] : a [unfilled] follow-up visit [FreeTextEntry1] : Hemoptysis

## 2021-12-17 NOTE — ASU PREOP CHECKLIST - BP NONINVASIVE DIASTOLIC (MM HG)
Above average 78 Average Average Average Average Average Average Average Average Average Average Average Average Average Average

## 2022-07-18 NOTE — ASU PREOP CHECKLIST - RESPIRATORY RATE (BREATHS/MIN)
But he does not have a follow-up office visit so this should be made.    Thank you    Sai Benedict M.D.     14

## 2022-07-27 NOTE — ASU DISCHARGE PLAN (ADULT/PEDIATRIC) - CARE COORDINATION DISCHARGE PLANNING
Thank you! Thank you for allowing me to care for you in the emergency department. It is my goal to provide you with excellent care. If you have not received excellent quality care, please ask to speak to the nurse manager. Please fill out the survey that will come to you by mail or email since we listen to your feedback! Below you will find a list of your tests from today's visit. Should you have any questions, please do not hesitate to call the emergency department. Labs  Recent Results (from the past 12 hour(s))   URINALYSIS W/ REFLEX CULTURE    Collection Time: 07/27/22  1:30 PM    Specimen: Urine   Result Value Ref Range    Color Yellow      Appearance Hazy (A) Clear      Specific gravity 1.015 1.003 - 1.030      pH (UA) 5.0 5.0 - 8.0      Protein Negative Negative mg/dL    Glucose >300 (A) Negative mg/dL    Ketone Negative Negative mg/dL    Bilirubin Negative Negative      Blood Trace (A) Negative      Urobilinogen 0.1 0.1 - 1.0 EU/dL    Nitrites Negative Negative      Leukocyte Esterase Large (A) Negative      WBC 0-4 0 - 4 /hpf    RBC 5-10 0 - 5 /hpf    Bacteria Negative Negative /hpf    UA:UC IF INDICATED Culture not indicated by UA result Culture not indicated by UA result      Hyaline cast 0-2 0 - 5 /lpf   CBC WITH AUTOMATED DIFF    Collection Time: 07/27/22  1:57 PM   Result Value Ref Range    WBC 6.6 3.6 - 11.0 K/uL    RBC 4.92 3.80 - 5.20 M/uL    HGB 13.4 11.5 - 16.0 g/dL    HCT 41.0 35.0 - 47.0 %    MCV 83.3 80.0 - 99.0 FL    MCH 27.2 26.0 - 34.0 PG    MCHC 32.7 30.0 - 36.5 g/dL    RDW 12.4 11.5 - 14.5 %    PLATELET 111 582 - 030 K/uL    MPV 12.6 8.9 - 12.9 FL    NRBC 0.0 0.0  WBC    ABSOLUTE NRBC 0.00 0.00 - 0.01 K/uL    NEUTROPHILS 35 32 - 75 %    LYMPHOCYTES 51 (H) 12 - 49 %    MONOCYTES 11 5 - 13 %    EOSINOPHILS 2 0 - 7 %    BASOPHILS 1 0 - 1 %    IMMATURE GRANULOCYTES 0 0 - 0.5 %    ABS. NEUTROPHILS 2.3 1.8 - 8.0 K/UL    ABS. LYMPHOCYTES 3.4 0.8 - 3.5 K/UL    ABS.  MONOCYTES 0.7 0.0 - 1.0 K/UL    ABS. EOSINOPHILS 0.2 0.0 - 0.4 K/UL    ABS. BASOPHILS 0.0 0.0 - 0.1 K/UL    ABS. IMM. GRANS. 0.0 0.00 - 0.04 K/UL    DF AUTOMATED     METABOLIC PANEL, COMPREHENSIVE    Collection Time: 07/27/22  1:57 PM   Result Value Ref Range    Sodium 136 136 - 145 mmol/L    Potassium 3.7 3.5 - 5.1 mmol/L    Chloride 103 97 - 108 mmol/L    CO2 25 21 - 32 mmol/L    Anion gap 8 5 - 15 mmol/L    Glucose 193 (H) 65 - 100 mg/dL    BUN 17 6 - 20 mg/dL    Creatinine 1.27 (H) 0.55 - 1.02 mg/dL    BUN/Creatinine ratio 13 12 - 20      GFR est AA 53 (L) >60 ml/min/1.73m2    GFR est non-AA 43 (L) >60 ml/min/1.73m2    Calcium 9.1 8.5 - 10.1 mg/dL    Bilirubin, total 0.5 0.2 - 1.0 mg/dL    AST (SGOT) 31 15 - 37 U/L    ALT (SGPT) 38 12 - 78 U/L    Alk. phosphatase 79 45 - 117 U/L    Protein, total 8.4 (H) 6.4 - 8.2 g/dL    Albumin 3.6 3.5 - 5.0 g/dL    Globulin 4.8 (H) 2.0 - 4.0 g/dL    A-G Ratio 0.8 (L) 1.1 - 2.2         Radiologic Studies  CT HEAD WO CONT   Final Result   1. No acute intracranial abnormality. 2.  Enlarging left frontal scalp next soft tissue and fat density lesion. Consider correlation with physical exam.              CT Results  (Last 48 hours)                 07/27/22 1536  CT HEAD WO CONT Final result    Impression:  1. No acute intracranial abnormality. 2.  Enlarging left frontal scalp next soft tissue and fat density lesion. Consider correlation with physical exam.               Narrative:  EXAM: CT HEAD WO CONT       INDICATION: hand tingling and numbness, history of stroke       COMPARISON: MRI brain 3/19/2019, CT head 3/17/2019. CONTRAST: None. TECHNIQUE: Unenhanced CT of the head was performed using 5 mm images. Brain and   bone windows were generated. Coronal and sagittal reformats. CT dose reduction   was achieved through use of a standardized protocol tailored for this   examination and automatic exposure control for dose modulation.          FINDINGS:   The ventricles and sulci are normal in size, shape and configuration. . There is   no significant white matter disease. There is no intracranial hemorrhage,   extra-axial collection, or mass effect. The basilar cisterns are open. No CT   evidence of acute infarct. The bone windows demonstrate no abnormalities. The right maxillary sinus mucous   retention cyst. Mastoid air cells are clear. Enlarging 13 mm mixed soft tissue   and fatty density lesion in the left frontal scalp (202-20), previously 9 mm                 CXR Results  (Last 48 hours)      None          ------------------------------------------------------------------------------------------------------------  The exam and treatment you received in the Emergency Department were for an urgent problem and are not intended as complete care. It is important that you follow-up with a doctor, nurse practitioner, or physician assistant to:  (1) confirm your diagnosis,  (2) re-evaluation of changes in your illness and treatment, and  (3) for ongoing care. Please take your discharge instructions with you when you go to your follow-up appointment. If you have any problem arranging a follow-up appointment, contact the Emergency Department. If your symptoms become worse or you do not improve as expected and you are unable to reach your health care provider, please return to the Emergency Department. We are available 24 hours a day. If a prescription has been provided, please have it filled as soon as possible to prevent a delay in treatment. If you have any questions or reservations about taking the medication due to side effects or interactions with other medications, please call your primary care provider or contact the ER. No

## 2023-02-12 NOTE — H&P PST ADULT - NS HIV RISK FACTOR NO
02/12/23 0703   Handoff   Communication Given Shift Handoff   Handoff Given To Haley Wang, RN   Handoff Communication Face to Face   Time Handoff Given 0703   End of Shift Check Performed Yes No, Declined

## 2023-03-06 ENCOUNTER — EMERGENCY (EMERGENCY)
Facility: HOSPITAL | Age: 48
LOS: 1 days | Discharge: HOME | End: 2023-03-06
Attending: STUDENT IN AN ORGANIZED HEALTH CARE EDUCATION/TRAINING PROGRAM | Admitting: STUDENT IN AN ORGANIZED HEALTH CARE EDUCATION/TRAINING PROGRAM
Payer: COMMERCIAL

## 2023-03-06 VITALS
SYSTOLIC BLOOD PRESSURE: 128 MMHG | RESPIRATION RATE: 17 BRPM | HEART RATE: 78 BPM | DIASTOLIC BLOOD PRESSURE: 90 MMHG | TEMPERATURE: 98 F | OXYGEN SATURATION: 97 %

## 2023-03-06 VITALS
RESPIRATION RATE: 17 BRPM | HEART RATE: 86 BPM | DIASTOLIC BLOOD PRESSURE: 110 MMHG | OXYGEN SATURATION: 97 % | SYSTOLIC BLOOD PRESSURE: 157 MMHG | TEMPERATURE: 98 F

## 2023-03-06 DIAGNOSIS — Z98.890 OTHER SPECIFIED POSTPROCEDURAL STATES: Chronic | ICD-10-CM

## 2023-03-06 LAB
ALBUMIN SERPL ELPH-MCNC: 4.5 G/DL — SIGNIFICANT CHANGE UP (ref 3.3–5)
ALP SERPL-CCNC: 77 U/L — SIGNIFICANT CHANGE UP (ref 40–120)
ALT FLD-CCNC: 62 U/L — HIGH (ref 4–41)
ANION GAP SERPL CALC-SCNC: 10 MMOL/L — SIGNIFICANT CHANGE UP (ref 7–14)
AST SERPL-CCNC: 33 U/L — SIGNIFICANT CHANGE UP (ref 4–40)
B PERT DNA SPEC QL NAA+PROBE: SIGNIFICANT CHANGE UP
B PERT+PARAPERT DNA PNL SPEC NAA+PROBE: SIGNIFICANT CHANGE UP
BASOPHILS # BLD AUTO: 0.05 K/UL — SIGNIFICANT CHANGE UP (ref 0–0.2)
BASOPHILS NFR BLD AUTO: 1 % — SIGNIFICANT CHANGE UP (ref 0–2)
BILIRUB SERPL-MCNC: 0.3 MG/DL — SIGNIFICANT CHANGE UP (ref 0.2–1.2)
BORDETELLA PARAPERTUSSIS (RAPRVP): SIGNIFICANT CHANGE UP
BUN SERPL-MCNC: 17 MG/DL — SIGNIFICANT CHANGE UP (ref 7–23)
C PNEUM DNA SPEC QL NAA+PROBE: SIGNIFICANT CHANGE UP
CALCIUM SERPL-MCNC: 9.4 MG/DL — SIGNIFICANT CHANGE UP (ref 8.4–10.5)
CHLORIDE SERPL-SCNC: 107 MMOL/L — SIGNIFICANT CHANGE UP (ref 98–107)
CO2 SERPL-SCNC: 24 MMOL/L — SIGNIFICANT CHANGE UP (ref 22–31)
CREAT SERPL-MCNC: 0.89 MG/DL — SIGNIFICANT CHANGE UP (ref 0.5–1.3)
D DIMER BLD IA.RAPID-MCNC: <150 NG/ML DDU — SIGNIFICANT CHANGE UP
EGFR: 106 ML/MIN/1.73M2 — SIGNIFICANT CHANGE UP
EOSINOPHIL # BLD AUTO: 0.09 K/UL — SIGNIFICANT CHANGE UP (ref 0–0.5)
EOSINOPHIL NFR BLD AUTO: 1.7 % — SIGNIFICANT CHANGE UP (ref 0–6)
FLUAV SUBTYP SPEC NAA+PROBE: SIGNIFICANT CHANGE UP
FLUBV RNA SPEC QL NAA+PROBE: SIGNIFICANT CHANGE UP
GLUCOSE SERPL-MCNC: 98 MG/DL — SIGNIFICANT CHANGE UP (ref 70–99)
HADV DNA SPEC QL NAA+PROBE: SIGNIFICANT CHANGE UP
HCOV 229E RNA SPEC QL NAA+PROBE: SIGNIFICANT CHANGE UP
HCOV HKU1 RNA SPEC QL NAA+PROBE: SIGNIFICANT CHANGE UP
HCOV NL63 RNA SPEC QL NAA+PROBE: SIGNIFICANT CHANGE UP
HCOV OC43 RNA SPEC QL NAA+PROBE: SIGNIFICANT CHANGE UP
HCT VFR BLD CALC: 47.9 % — SIGNIFICANT CHANGE UP (ref 39–50)
HGB BLD-MCNC: 15.3 G/DL — SIGNIFICANT CHANGE UP (ref 13–17)
HMPV RNA SPEC QL NAA+PROBE: DETECTED
HPIV1 RNA SPEC QL NAA+PROBE: SIGNIFICANT CHANGE UP
HPIV2 RNA SPEC QL NAA+PROBE: SIGNIFICANT CHANGE UP
HPIV3 RNA SPEC QL NAA+PROBE: SIGNIFICANT CHANGE UP
HPIV4 RNA SPEC QL NAA+PROBE: SIGNIFICANT CHANGE UP
IANC: 2.95 K/UL — SIGNIFICANT CHANGE UP (ref 1.8–7.4)
IMM GRANULOCYTES NFR BLD AUTO: 0.4 % — SIGNIFICANT CHANGE UP (ref 0–0.9)
LYMPHOCYTES # BLD AUTO: 1.62 K/UL — SIGNIFICANT CHANGE UP (ref 1–3.3)
LYMPHOCYTES # BLD AUTO: 31.3 % — SIGNIFICANT CHANGE UP (ref 13–44)
M PNEUMO DNA SPEC QL NAA+PROBE: SIGNIFICANT CHANGE UP
MCHC RBC-ENTMCNC: 28.4 PG — SIGNIFICANT CHANGE UP (ref 27–34)
MCHC RBC-ENTMCNC: 31.9 GM/DL — LOW (ref 32–36)
MCV RBC AUTO: 89 FL — SIGNIFICANT CHANGE UP (ref 80–100)
MONOCYTES # BLD AUTO: 0.44 K/UL — SIGNIFICANT CHANGE UP (ref 0–0.9)
MONOCYTES NFR BLD AUTO: 8.5 % — SIGNIFICANT CHANGE UP (ref 2–14)
NEUTROPHILS # BLD AUTO: 2.95 K/UL — SIGNIFICANT CHANGE UP (ref 1.8–7.4)
NEUTROPHILS NFR BLD AUTO: 57.1 % — SIGNIFICANT CHANGE UP (ref 43–77)
NRBC # BLD: 0 /100 WBCS — SIGNIFICANT CHANGE UP (ref 0–0)
NRBC # FLD: 0 K/UL — SIGNIFICANT CHANGE UP (ref 0–0)
PLATELET # BLD AUTO: 226 K/UL — SIGNIFICANT CHANGE UP (ref 150–400)
POTASSIUM SERPL-MCNC: 4.6 MMOL/L — SIGNIFICANT CHANGE UP (ref 3.5–5.3)
POTASSIUM SERPL-SCNC: 4.6 MMOL/L — SIGNIFICANT CHANGE UP (ref 3.5–5.3)
PROT SERPL-MCNC: 7.7 G/DL — SIGNIFICANT CHANGE UP (ref 6–8.3)
RAPID RVP RESULT: DETECTED
RBC # BLD: 5.38 M/UL — SIGNIFICANT CHANGE UP (ref 4.2–5.8)
RBC # FLD: 12.5 % — SIGNIFICANT CHANGE UP (ref 10.3–14.5)
RSV RNA SPEC QL NAA+PROBE: SIGNIFICANT CHANGE UP
RV+EV RNA SPEC QL NAA+PROBE: SIGNIFICANT CHANGE UP
SARS-COV-2 RNA SPEC QL NAA+PROBE: SIGNIFICANT CHANGE UP
SODIUM SERPL-SCNC: 141 MMOL/L — SIGNIFICANT CHANGE UP (ref 135–145)
TROPONIN T, HIGH SENSITIVITY RESULT: 8 NG/L — SIGNIFICANT CHANGE UP
WBC # BLD: 5.17 K/UL — SIGNIFICANT CHANGE UP (ref 3.8–10.5)
WBC # FLD AUTO: 5.17 K/UL — SIGNIFICANT CHANGE UP (ref 3.8–10.5)

## 2023-03-06 PROCEDURE — 71046 X-RAY EXAM CHEST 2 VIEWS: CPT | Mod: 26

## 2023-03-06 PROCEDURE — 99284 EMERGENCY DEPT VISIT MOD MDM: CPT

## 2023-03-06 NOTE — ED PROVIDER NOTE - OBJECTIVE STATEMENT
47-year-old male with a past medical history of hypertension, right lower lobe removal presenting with hemoptysis. Patient has had low-grade fever, cough, congestion for the last 3 days. Patient woke up this morning had 2 episodes of hemoptysis described as blood mixed in with sputum. Patient denies chest pain, shortness of breath, nausea, vomiting.

## 2023-03-06 NOTE — ED PROVIDER NOTE - CLINICAL SUMMARY MEDICAL DECISION MAKING FREE TEXT BOX
47-year-old male with a past medical history of hypertension, right lower lobe lung removal presenting with hemoptysis, low-grade fever, congestion. Patient has had 2 episodes of hemoptysis. Denies recent travel, night sweats, weight loss. CBC, CMP, troponin, D-dimer, chest x-ray.

## 2023-03-06 NOTE — ED PROVIDER NOTE - PROGRESS NOTE DETAILS
Patient reassessed, Resting comfortably in the room, patient informed of viral panel. Patient has been instructed to follow-up with his primary care doctor. DO Sakshi (PGY-2)

## 2023-03-06 NOTE — ED PROVIDER NOTE - ATTENDING CONTRIBUTION TO CARE
Dr. Bahena, Attending Physician-  I performed a face to face bedside interview with patient regarding history of present illness, review of symptoms and past medical history. I completed an independent physical exam.  I have discussed patient's plan of care with the resident.    47M, HTN, s/p R lower lobe lung lobectomy who presents with hemoptysis. For the past few days, has been dealing with low grade fevers, cough, and congestion. Then had two episodes of small volume hemoptysis. Otherwise, feels well. No headaches, cp, sob, belly pain, nvd, dysuria, hematuria, recent travel, trauma, syncope. Physical: afebrile, well appearing, rrr, ctabl abdomen soft no le edema. Plan: labs, dimer, ekg, cxr. Dispo pending.

## 2023-03-06 NOTE — ED ADULT TRIAGE NOTE - CHIEF COMPLAINT QUOTE
Pt arrives ambulatory to triage c/o 2 episodes of hematemesis this AM. Pt endorses right-sided pleuritic CP. RR even and unlabored. PMH: aspiration PNA (right lower lobe removed), psoriasis, HLD, HTN.

## 2023-03-06 NOTE — ED PROVIDER NOTE - PATIENT PORTAL LINK FT
You can access the FollowMyHealth Patient Portal offered by [facility] by registering at the following website: [email or web address]. By joining [email or web address]’s FollowMyHealth portal, you will also be able to view your health information using other applications (apps) compatible with our system.

## 2023-03-06 NOTE — ED PROVIDER NOTE - NSFOLLOWUPINSTRUCTIONS_ED_ALL_ED_FT
Please follow up with your primary care physician within the next 4-6 days.    You may take tylenol for fever.    Please return to the emergency department if you experience any of the following symptoms:    Fever  Chest pain  Difficulty breathing  Abdominal pain  Nausea  Vomiting   Coughing up large amounts of blood

## 2023-04-21 NOTE — H&P PST ADULT - MOUTH
Debridement Text: The wound edges were debrided prior to proceeding with the closure to facilitate wound healing. normal mouth and gums/moist

## 2023-06-11 NOTE — H&P PST ADULT - GASTROINTESTINAL DETAILS
[facility]    History and Physical  Hospitalist       Date of Admission:  6/11/2023    Assessment & Plan   Dinesh Farfan is a 56 year old male with a past medical history of tobacco use disorder, colon cancer previously on chemotherapy that has been completed who presents with confusion/slurred speech since last night.    #Acute CVA: Patient reportedly had confusion and slurred speech that started at approximately 1900 on 6/10.  Looking back, however, patient's spouse thinks that she may have noticed speech changes on Friday 6/9.  But since last night, certainly patient has had word finding difficulties.  Has also noted some slurred speech.  No unilateral weakness.  No loss of vision on one side or the other.  No sensory changes that patient has noticed.  He denies any chest pain.  No palpitations.  No significant headache.  -ED, patient afebrile and nontachycardic.  Normotensive.  Saturating well on room air.  CBC and BMP unremarkable.  Troponin negative.  INR within normal limits.  CT of the head showed evidence of early infarct in the left MCA distribution involving the left insula without hemorrhage.  CTA of the head and neck showed complete occlusion of the left internal carotid artery with robust collateral reperfusion along with asymmetric posterior division MCA branch with possible ostial obstruction.  -Stroke neurology was consulted.  Vascular surgery not recommended due to chronic nature of occlusion.  Recommendation for keep head of the bed flat, normal saline at 75 cc/h, avoidance of hypotension, permissive hypertension.  If there is worsening symptoms the stroke team should be notified and they may consider heparin drip.  -Neuro stroke consulted.  -Monitor on telemetry.  TTE ordered.  -Permissive HTN overnight. NS @ 75 ml/hr overnight.  Keep HOB flat.   -Neuro checks every 2 hours  -We will continue daily aspirin 325, Plavix 75 mg daily.  Atorvastatin 80 mg daily.  Lipid  profile ordered.  -Therapies consulted.    #Anxiety: Patient has underlying anxiety.  We will await pharmacy med rec.  It looks like he does take Atarax 25 mg as needed for anxiety.    #Recent colon cancer: Underwent right hemicolectomy for grade 2 adenocarcinoma on 11/23/2020.  He completed 6 cycles of Xeloda and he has colonoscopy due 11/2024.      DVT Prophylaxis: Pneumatic Compression Devices  Code Status: Full Code  Dispo: Admit to inpatient    Shai Reyes MD    Primary Care Physician   MIGUEL A MANCERA    Chief Complaint   Speech changes    History is obtained from the patient, patient's spouse, patient's chart and discussed with ER physician    History of Present Illness   Dinesh Farfan is a 56 year old male with a past medical history of tobacco use disorder, colon cancer previously on chemotherapy that has been completed who presents with confusion/slurred speech since last night.    Patient reportedly had confusion and slurred speech that started at approximately 1900 on 6/10.  Looking back, however, patient's spouse thinks that she may have noticed speech changes on Friday 6/9.  But since last night, certainly patient has had word finding difficulties.  Has also noted some slurred speech.  No unilateral weakness.  No loss of vision on one side or the other.  No sensory changes that patient has noticed.  He denies any chest pain.  No palpitations.  No significant headache.    In the ED, patient afebrile and nontachycardic.  Normotensive.  Saturating well on room air.  CBC and BMP unremarkable.  Troponin negative.  INR within normal limits.  CT of the head showed evidence of early infarct in the left MCA distribution involving the left insula without hemorrhage.  CTA of the head and neck showed complete occlusion of the left internal carotid artery with robust collateral reperfusion along with asymmetric posterior division MCA branch with possible ostial obstruction.    Stroke neurology was consulted.   Vascular surgery not recommended due to chronic nature of occlusion.  Recommendation for keep head of the bed flat, normal saline at 75 cc/h, avoidance of hypotension, permissive hypertension.  If there is worsening symptoms the stroke team should be notified and they may consider heparin drip.    He will be admitted here at [facility] and placed on full dose aspirin, Plavix along with Lipitor 80 mg daily.  Stroke work-up.    Past Medical History    I have reviewed this patient's medical history and updated it with pertinent information if needed.   Past Medical History:   Diagnosis Date     Herniated lumbar disc without myelopathy    Colon cancer    Past Surgical History   I have reviewed this patient's surgical history and updated it with pertinent information if needed.  Right hemicolectomy in November 2020  Multiple musculoskeletal surgeries including rotator cuff repair, femur fracture.  Previous motocross rider.    Prior to Admission Medications   Prior to Admission Medications   Prescriptions Last Dose Informant Patient Reported? Taking?   CLONAZEPAM PO   Yes No   Sig: Take 0.5 mg by mouth   HYDROcodone-acetaminophen (NORCO) 5-325 MG per tablet   No No   Sig: Take 1-2 tablets by mouth every 8 hours as needed for moderate to severe pain maximum 2 tablet(s) per day   HYDROcodone-acetaminophen (NORCO) 7.5-325 MG per tablet   Yes No   Sig: Take 1 tablet by mouth every 6 hours as needed for moderate to severe pain   TERAZOSIN HCL PO   Yes No   Sig: Take 5 mg by mouth   ZOLPIDEM TARTRATE PO   Yes No   Sig: Take 10 mg by mouth   azithromycin (ZITHROMAX) 250 MG tablet   No No   Sig: Two tablets first day, then one tablet daily for four days.      Facility-Administered Medications: None     Allergies   No Known Allergies    Social History   I have reviewed this patient's social history and updated it with pertinent information if needed. Dinesh Farfan  reports that he has been smoking cigarettes. He has been smoking  an average of 1.00 packs per day. He does not have any smokeless tobacco history on file. He reports current alcohol use. He reports that he does not use drugs.    Family History   I have reviewed this patient's family history and updated it with pertinent information if needed.   Family History   Problem Relation Age of Onset     Family History Negative Mother      Family History Negative Father        Review of Systems   The 10 point Review of Systems is negative other than noted in the HPI or here.     Physical Exam   Temp: 97.4  F (36.3  C) Temp src: Temporal BP: 131/85 Pulse: 71   Resp: 12 SpO2: 96 %      Vital Signs with Ranges  Temp:  [97.4  F (36.3  C)] 97.4  F (36.3  C)  Pulse:  [] 71  Resp:  [12-20] 12  BP: (129-131)/(78-85) 131/85  SpO2:  [96 %-98 %] 96 %  195 lbs 5.24 oz    Constitutional: NAD, Nontoxic.  Patient speaks slowly with some slurred speech noted and definitive word finding difficulty.  HEENT: Normocephalic, MMM, no elevation of JVD noted  Respiratory: Nl WOB, Clear bilaterally, No wheezes, no crackles  Cardiovascular: Regular, no murmur  GI: BS+, NT, ND, no rebound or guarding  Lymph/Hematologic: No bruising. No cervical LAD  Skin: No rash  Musculoskeletal: Nl Tone, No edema noted  Neurologic: A&Ox3, with noted mild slurred speech and certain word finding difficulties.  Patient has 5 out of 5 strength in all extremities.  Sensation to gross touch is in touch in all extremities.  Psychiatric: Calm    Data   Data reviewed today:  I personally reviewed   Recent Labs   Lab 06/11/23  1220 06/11/23  1214   WBC 6.4  --    HGB 14.1  --    MCV 93  --      --    INR 0.91  --      --    POTASSIUM 3.7  --    CHLORIDE 100  --    CO2 26  --    BUN 16.7  --    CR 0.81  --    ANIONGAP 11  --    UDSTIN 9.0  --    * 91       Recent Results (from the past 24 hour(s))   CT Head w/o Contrast    Narrative    EXAM: CT HEAD W/O CONTRAST  LOCATION: [facility]  DATE/TIME:  6/11/2023 12:25 PM CDT    INDICATION: Slurred speech and confusion. Right facial droop.  COMPARISON: None.  TECHNIQUE: Routine CT Head without IV contrast. Multiplanar reformats. Dose reduction techniques were used.    FINDINGS:  INTRACRANIAL CONTENTS: No intracranial hemorrhage, extraaxial collection, or mass effect.  Parenchymal low-attenuation and localized swelling involving the left insula. This measures 2 cm in diameter. Mild localized mass effect. No hemorrhage.   Curvilinear calcification near the region of the left MCA trifurcation could represent calcified embolus. There are also a few calcifications posteriorly which are indeterminate. No midline shift. Negative for hemorrhage. No hydrocephalus. Basal cisterns   intact.    VISUALIZED ORBITS/SINUSES/MASTOIDS: No intraorbital abnormality. Retention cyst in the left maxillary sinus. No middle ear or mastoid effusion.    BONES/SOFT TISSUES: No acute abnormality.      Impression    IMPRESSION:  1.  Evidence of early infarct in the left MCA distribution involving the left insula.    2.  Findings discussed with Dr. Coffman in the emergency department 06/11/2023 at 1238 hours.   CTA Head Neck with Contrast    Narrative    EXAM: CTA HEAD NECK W CONTRAST  LOCATION: [facility]  DATE/TIME: 6/11/2023 12:31 PM CDT    INDICATION: Code Stroke to evaluate for potential thrombolysis and thrombectomy. PLEASE READ IMMEDIATELY. Right facial droop. Slurred speech and confusion  COMPARISON: None.  CONTRAST: 75mL Isovue 370  TECHNIQUE: Head and neck CT angiogram with IV contrast. axial helical CT images of the head and neck vessels obtained during the arterial phase of intravenous contrast administration. Axial 2D reconstructed images and multiplanar 3D MIP reconstructed   images of the head and neck vessels were performed by the technologist. Dose reduction techniques were used. All stenosis measurements made according to NASCET criteria unless otherwise  specified.    FINDINGS:     HEAD CTA:  ANTERIOR CIRCULATION: Complete occlusion of the left internal carotid artery. There is collateral reperfusion of the left ICA terminus, left anterior middle cerebral arteries and branches. Fairly robust collateral flow is present. Negative for aneurysm   or vascular malformation. Asymmetry of the posterior division M2 segment of the left MCA noted raising the possibility of ostial occlusion. Calcified plaque in the left carotid siphon.      POSTERIOR CIRCULATION: No stenosis/occlusion, aneurysm, or high flow vascular malformation. Balanced vertebral arteries supply a normal basilar artery.     DURAL VENOUS SINUSES: Expected enhancement of the major dural venous sinuses.    NECK CTA:  RIGHT CAROTID: 20% stenosis at the ICA origin with calcified and noncalcified plaque. No evidence for dissection.    LEFT CAROTID: Complete occlusion of the left common and internal carotid arteries. Heavily calcified plaque at the origin of the left internal carotid artery. Question intraluminal thrombus at the proximal left common carotid artery near the origin of   the vessel contiguous with the included segment and raising the possibility of superimposed acute thrombus. This is best seen on images 60-80 of series 6.    VERTEBRAL ARTERIES: No focal stenosis or dissection. Balanced vertebral arteries.    AORTIC ARCH: Brachiocephalic artery origin widely patent. Mild stenosis of the left subclavian artery origin with about 25% narrowing. Calcified and noncalcified plaque. The origin of the left common carotid artery is patent however occlusion of the   proximal portion of this vessel is present with question of intraluminal thrombus and a meniscus near the arch origin.    NONVASCULAR STRUCTURES: Mild-to-moderate cervical spondylosis.      Impression    IMPRESSION:     HEAD CTA:   1.  Complete occlusion of the left internal carotid artery with a fairly robust collateral reperfusion to the left  cerebral hemisphere.    2.  Asymmetric posterior division MCA branch on left raising the possibility of ostial obstruction.    NECK CTA:  1.  Occlusion of the proximal left common carotid artery. Meniscus is present raising concern for intraluminal thrombus.    2.  Findings discussed with Dr. Coffman in the emergency department at [facility] on 06/11/2023 at 1242 hours.   CT Head Perfusion w Contrast - For Tier 2 Stroke    Narrative    EXAM: CT HEAD PERFUSION W CONTRAST  LOCATION: [facility]  DATE/TIME: 6/11/2023 12:41 PM CDT    INDICATION: Code Stroke to evaluate for potential thrombolysis and thrombectomy. Evaluate mismatch between penumbra and core infarct. READ IMMEDIATELY.  COMPARISON: None.  TECHNIQUE: CT cerebral perfusion was performed utilizing a second contrast bolus. Perfusion data were post processed with generation of standard perfusion maps and estimation of ischemic/infarcted volumes utilizing standard threshold values. Dose   reduction techniques were used. All stenosis measurements made according to NASCET criteria unless otherwise specified.  CONTRAST: 50mL Isovue 370    FINDINGS:     RAPID ANALYSIS:  CBF<30%: Less than one third of the affected left MCA vascular territory  Tmax>6sec: Greater than two thirds of the affected vascular territory  Mismatch volume: More than one third of the affected vascular territory        Impression    IMPRESSION:     CT PERFUSION:  1.  Large volume abnormal perfusion within the left hemisphere MCA distribution with penumbra.       Clinically Significant Risk Factors Present on Admission                  # Hypertension: Home medication list includes antihypertensive(s)                   soft/no masses palpable/nontender/no distention/bowel sounds normal

## 2023-07-24 NOTE — DISCHARGE NOTE NURSING/CASE MANAGEMENT/SOCIAL WORK - NSCORESITESY/N_GEN_A_CORE_RD
No [Alert] : alert [Normal Voice/Communication] : normal voice/communication [Healthy Appearing] : healthy appearing [No Acute Distress] : no acute distress [Well Developed] : well developed [Well Nourished] : well nourished [Sclera] : the sclera and conjunctiva were normal [Hearing Threshold Finger Rub Not [place]] : hearing was normal [Normal Lips/Gums] : the lips and gums were normal [Oropharynx] : the oropharynx was normal [Normal Appearance] : the appearance of the neck was normal [No Neck Mass] : no neck mass was observed [No Respiratory Distress] : no respiratory distress [No Acc Muscle Use] : no accessory muscle use [Respiration, Rhythm And Depth] : normal respiratory rhythm and effort [Auscultation Breath Sounds / Voice Sounds] : lungs were clear to auscultation bilaterally [Heart Rate And Rhythm] : heart rate was normal and rhythm regular [Normal S1, S2] : normal S1 and S2 [Murmurs] : no murmurs [None] : no edema [Bowel Sounds] : normal bowel sounds [Abdomen Tenderness] : non-tender [No Masses] : no abdominal mass palpated [Abdomen Soft] : soft [] : no hepatosplenomegaly [No CVA Tenderness] : no CVA  tenderness [Abnormal Walk] : normal gait [No Joint Swelling] : no joint swelling seen [Normal Color / Pigmentation] : normal skin color and pigmentation [Oriented To Time, Place, And Person] : oriented to person, place, and time [de-identified] : Patient is overweight [de-identified] : Deferred until colonoscopy

## 2023-12-22 NOTE — PATIENT PROFILE ADULT - NSPROPOAPRESSUREINJURY_GEN_A_NUR
BCG 2/3 instilled via 14 fr. Coude without difficulty.  Tolerating treatments well.  Able to hold about 1 hour and 40 minutes.  Slight fever the following day.  RTC in 1 week.  
no

## 2025-07-09 NOTE — ASU PREOP CHECKLIST - PATIENT'S PERSONAL PROPERTY GIVEN TO
Daily Note     Today's date: 2025  Patient name: Thomas Horne  : 1944  MRN: [id number]  Referring provider: Kirk Acuna MD  Dx:   Encounter Diagnosis     ICD-10-CM    1. Sacroiliitis (HCC)  M46.1                      Subjective: pt has no new complaints on arrival to session overall tolerating the TE well so far. He does feel like things are less painful and his posture has been better overall but does struggle w/ this when he is fatigued.       Objective: See treatment diary below  Pt feels that his abrasion is less red than when he was here last, he did not change the Band-Aid, allowed to breath t/o session, changed the Band-Aid today post session.     Assessment: Tolerated treatment well. Additional SL strengthening tolerated today w/o complaints, cont to progress global LE strengthening/stretching as tolerated as he is dem good compliance to self stretching at home. Patient would benefit from continued PT      Plan: Continue per plan of care.      Precautions:   Past Medical History:   Diagnosis Date    Acute kidney injury superimposed on chronic kidney disease  ([facility]) 2022    Anemia     CAD (coronary artery disease)     Callus     Cancer ([facility])     prostate    CHF (congestive heart failure) ([facility])     Chronic kidney disease     Clotting disorder ([facility])     Coronary artery disease     Deep vein thrombosis ([facility])     Diabetes mellitus ([facility])     Diabetic ulcer of left foot associated with type 2 diabetes mellitus, with muscle involvement without evidence of necrosis ([facility]) 2024    Diabetic ulcer of right midfoot associated with diabetes mellitus due to underlying condition, limited to breakdown of skin ([facility]) 06/15/2023    Diabetic ulcer of toe of right foot associated with type 2 diabetes mellitus, with necrosis of bone ([facility]) 2025    Difficulty walking     Duodenal ulcer     Ear problems     Erectile dysfunction     Heart disease     Heart murmur     HL (hearing loss)      Hyperlipidemia     Hypertension     Myocardial infarction (HCC)     Neuropathy     Bilateral feet    Neuropathy in diabetes (HCC)     Plantar fasciitis     Prostate cancer (HCC) 2012    Sleep apnea     Could not tolerate CPAP    Urinary incontinence     Vascular disorder 2023     Past Surgical History:   Procedure Laterality Date    ABDOMINAL AORTIC ANEURYSM REPAIR      Stented    ADENOIDECTOMY      BACK SURGERY  1985    CARDIAC CATHETERIZATION Left 10/19/2022    Procedure: Cardiac Left Heart Cath;  Surgeon: Danielle Pereira MD;  Location: [facility];  Service: Cardiology    CARDIAC CATHETERIZATION  6/20/2025    Procedure: Cardiac Catheterization;  Surgeon: Itzel Parks DO;  Location: [facility];  Service: Cardiology    CARDIAC CATHETERIZATION N/A 6/20/2025    Procedure: Cardiac Coronary Angiogram;  Surgeon: Itzel Parks DO;  Location: [facility];  Service: Cardiology    CARDIAC ELECTROPHYSIOLOGY PROCEDURE Left 12/16/2024    Procedure: Cardiac pacer implant;  Surgeon: Danielle Pereira MD;  Location: [facility];  Service: Cardiology    CARDIAC SURGERY  2002    3 cardiac bypass then angioplasty 7/2020    CHOLECYSTECTOMY      COLONOSCOPY      CORONARY ARTERY BYPASS GRAFT      IR LOWER EXTREMITY ANGIOGRAM  11/01/2023    IR LOWER EXTREMITY ANGIOGRAM  08/07/2024    IR LOWER EXTREMITY ANGIOGRAM  01/07/2025    LAMINECTOMY  1990    AR BYPASS W/VEIN FEMORAL-POPLITEAL Right 11/16/2023    Procedure: BYPASS FEMORAL-POPLITEAL WITH CRYO VEIN, RIGHT FEMORAL ENDARTERECTOMY;  Surgeon: Vasquez Clark MD;  Location: [facility];  Service: Vascular    AR BYPASS W/VEIN FEMORAL-POPLITEAL Left 08/30/2024    Procedure: left lower extremity above knee popliteal to below knee popliteal artery bypass with PTFE graft;  Surgeon: Vasquez Clark MD;  Location: [facility];  Service: Vascular    AR SLCTV CATHJ 3RD+ ORD SLCTV ABDL PEL/LXTR BRNCH Right 11/01/2023    Procedure:  "ARTERIOGRAM Right lower extremity arteriogram with CO2 via right groin access;  Surgeon: Vasquez Clark MD;  Location: [facility];  Service: Vascular    MN SLCTV CATHJ 3RD+ ORD SLCTV ABDL PEL/LXTR BRNCH Left 08/07/2024    Procedure: diagnostic LLE Arteriogram;  Surgeon: Vasquez Clark MD;  Location: [facility];  Service: Vascular    PROSTATE SURGERY      PROSTATECTOMY      TONSILLECTOMY      UPPER GASTROINTESTINAL ENDOSCOPY  2/14/2024    URINARY SPHINCTER IMPLANT     SOC: 7/3/2025  FOTO: 7/3/2025    POC EXP: 9/25/20025  DAILY TREATMENT LOG:  DATE 7/3/2025 7/7/2025 7/9/2025     VISIT #/AUTH  2 3      MANUAL                        Neuro Re-Ed                                                                Ther Ex 15' 35' 30'      Recumb bike  L1x5' L2x5'      Sup hip flexor stretch off edge 1'x1 R/L 1'x1 R/L 1' R/L      Sup fig 4 stretch 30\"x2 R/L 30\"x3 R/L 30\"x3 R/L      Sup ham stretch w/ SOS 30\"x2 R/L 30\"x3 R/L 30\"x3 R/L              Lumb rotat in SL 30\"x2 R/L       HIWOT vs counter  Against counter 5\"x10; 2x At counter 5\"x10; 2x      Hip flexor stretch foot on step  8\" step 10\"x5 R/L 8\" step 10\"x5 R/L      SL leg press    40# 2x10 R/L      Stand alt hip abd w/ TB   1x10 R/L      Stand alt hip exten w/ TB  No TB 1x10 R/L  No TB 1x10 R/L      HEP Issued & reviewed       Ther Activity  8' 10'      bridges 1x5 1x10; 2x 1x10; 2x      Long stepping at rail  3 laps      STS   Elevated mat 24\" 1x10      Gait Training                        Modalities                        HEP  Access Code: [email or web address]  URL: [email or web address]  Date: 07/07/2025  Prepared by: Salima Ford    Exercises  - Supine Hamstring Stretch with Strap  - 1-2 x daily - 7 x weekly - 3 reps - 30 sec hold  - Supine Hip External Rotation Stretch  - 1-2 x daily - 3 reps - 30 sec hold  - Supine Bridge  - 1-2 x daily - 2 sets - 5-10 reps  - Modified Alexandre Stretch  - 1-2 x daily - 2 reps - 1 min hold  - Standing Lumbar " Extension with Counter  - 1-2 x daily - 2 sets - 10 reps - 5 sec hold              on unit